# Patient Record
Sex: FEMALE | Race: WHITE | Employment: OTHER | URBAN - METROPOLITAN AREA
[De-identification: names, ages, dates, MRNs, and addresses within clinical notes are randomized per-mention and may not be internally consistent; named-entity substitution may affect disease eponyms.]

---

## 2018-01-09 PROBLEM — I99.8 ISCHEMIC PAIN OF FOOT, RIGHT: Status: ACTIVE | Noted: 2018-01-09

## 2018-01-09 PROBLEM — I96 GANGRENE OF TOE OF RIGHT FOOT (HCC): Status: ACTIVE | Noted: 2018-01-09

## 2018-01-09 PROBLEM — I73.9 PAD (PERIPHERAL ARTERY DISEASE) (HCC): Status: ACTIVE | Noted: 2018-01-09

## 2018-01-09 PROBLEM — M79.671 ISCHEMIC PAIN OF FOOT, RIGHT: Status: ACTIVE | Noted: 2018-01-09

## 2018-08-08 PROBLEM — IMO0002 GREAT TOE AMPUTATION STATUS, RIGHT: Status: ACTIVE | Noted: 2018-08-08

## 2018-08-08 PROBLEM — I96 GANGRENE OF RIGHT FOOT (HCC): Status: ACTIVE | Noted: 2018-08-08

## 2019-08-09 ENCOUNTER — APPOINTMENT (OUTPATIENT)
Dept: WOUND CARE | Facility: HOSPITAL | Age: 74
End: 2019-08-09
Attending: NURSE PRACTITIONER
Payer: MEDICARE

## 2019-10-01 ENCOUNTER — HOSPITAL ENCOUNTER (OUTPATIENT)
Dept: GENERAL RADIOLOGY | Facility: HOSPITAL | Age: 74
Discharge: HOME | End: 2019-10-01
Attending: NURSE PRACTITIONER
Payer: MEDICARE

## 2019-10-01 ENCOUNTER — OFFICE VISIT (OUTPATIENT)
Dept: WOUND CARE | Facility: HOSPITAL | Age: 74
End: 2019-10-01
Attending: NURSE PRACTITIONER
Payer: MEDICARE

## 2019-10-01 DIAGNOSIS — L97.519: ICD-10-CM

## 2019-10-01 DIAGNOSIS — I70.245 ATHEROSCLEROSIS OF NATIVE ARTERY OF BOTH LOWER EXTREMITIES WITH BILATERAL ULCERATION OF OTHER PART OF FEET (HCC): ICD-10-CM

## 2019-10-01 DIAGNOSIS — I70.235 ATHEROSCLEROSIS OF NATIVE ARTERY OF BOTH LOWER EXTREMITIES WITH BILATERAL ULCERATION OF OTHER PART OF FEET (HCC): ICD-10-CM

## 2019-10-01 DIAGNOSIS — I73.9 PVD (PERIPHERAL VASCULAR DISEASE) (HCC): ICD-10-CM

## 2019-10-01 DIAGNOSIS — L97.516 NON-PRESSURE CHRONIC ULCER OF OTHER PART OF RIGHT FOOT WITH BONE INVOLVEMENT WITHOUT EVIDENCE OF NECROSIS (HCC): Primary | ICD-10-CM

## 2019-10-01 PROCEDURE — 99214 OFFICE O/P EST MOD 30 MIN: CPT

## 2019-10-01 PROCEDURE — 73630 X-RAY EXAM OF FOOT: CPT | Performed by: NURSE PRACTITIONER

## 2019-10-01 NOTE — PROGRESS NOTES
Subjective    Chief Complaint  This information was obtained from the patient  Patient is here for initial visit. She had right great toe amp in june 2018 with Dr. Maddy Lew. Patient reports wound has never completely closed.  She has been going to t Medical History  This information was obtained from the patient, caregiver and chart.   Patient has a medical history of:  Ischemic pain of foot, right  PAD (peripheral artery disease)  Gangrene of toe of right foot  Great toe amputation status, right  Leuk Sprycel - oral 80 mg tablet  Sensipar - oral 30 mg tablet  Auryxia - oral 210 mg iron tablet  allopurinol - oral 300 mg tablet  Miralax - oral 17 gram powder in packet  Senna with Docusate Sodium - oral 8.6 mg-50 mg tablet  Norco - oral 10 mg-325 mg tablet Wound #1 Right Great Toe amp site is a chronic Full Thickness Arterial Ulcer and has received a status of Not Healed. Initial wound encounter measurements are 1cm length x 0.3cm width x 0.2cm depth, with an area of 0.3 sq cm and a volume of 0.06 cubic cm. 3) consider arterial pumps in the future  4) get xray to r/o osteo    Please let vascular know blood flow to the right wound be helpful    Misc/Additional Orders  Supplement with a daily multivitamin.   Increase dietary protein - look for Noble by go la all questions/concerns addressed with patient and daughter    Electronic Signature(s)  Signed By: Date:  Courtney RUCKER, ROSEMARIE-FABIAN, CFEASTON, CSWS, [facility] 10/01/2019 94:27:09       Entered By: Courtney Knox on 10/01/2019 09:08:10

## 2019-10-08 ENCOUNTER — OFFICE VISIT (OUTPATIENT)
Dept: WOUND CARE | Facility: HOSPITAL | Age: 74
End: 2019-10-08
Attending: NURSE PRACTITIONER
Payer: MEDICARE

## 2019-10-08 DIAGNOSIS — L97.516 NON-PRESSURE CHRONIC ULCER OF OTHER PART OF RIGHT FOOT WITH BONE INVOLVEMENT WITHOUT EVIDENCE OF NECROSIS (HCC): Primary | ICD-10-CM

## 2019-10-08 DIAGNOSIS — I70.245 ATHEROSCLEROSIS OF NATIVE ARTERY OF BOTH LOWER EXTREMITIES WITH BILATERAL ULCERATION OF OTHER PART OF FEET (HCC): ICD-10-CM

## 2019-10-08 DIAGNOSIS — I70.235 ATHEROSCLEROSIS OF NATIVE ARTERY OF BOTH LOWER EXTREMITIES WITH BILATERAL ULCERATION OF OTHER PART OF FEET (HCC): ICD-10-CM

## 2019-10-08 PROCEDURE — 99213 OFFICE O/P EST LOW 20 MIN: CPT

## 2019-10-08 NOTE — PROGRESS NOTES
Subjective    Chief Complaint  This information was obtained from the patient  Patient is here for a wound care follow up. She denies any pain or new wound concerns.     Allergies  No Known Allergies    HPI  This information was obtained from the patient, hammad Psychiatric: Nervousness / Tension    Patient denies complaints or symptoms related to:  Constitutional Symptoms (General Health):  Fatigue, Fever, Loss of Appetite, Marked Weight Change, Night Sweats, Chills  Respiratory: Cough, Shortness of Breath  Cardio Wound #1 Right Great Toe amp site is a chronic Full Thickness Arterial Ulcer and has received a status of Not Healed.  Subsequent wound encounter measurements are 1.3cm length x 0.3cm width x 0.3cm depth, with an area of 0.39 sq cm and a volume of 0.117 cub Misc/Additional Orders  Supplement with a daily multivitamin. Increase dietary protein - look for Noble by [employer] (These are essential branch chain amino acids that help with tissue building and wound healing) and take 2 packets/day.  you can order on continue with xeroform. we are awaiting approval for skin subs.  vascular intervention scheduled in 2 days    Electronic Signature(s)  Signed By: Date:  Vignesh DEL CID-C, ROSEMARIE-AP, CFCN, CSWS, [address] [place] [address],[address] [facility] 10/08/2019 08:47:42       Entered By: Vignesh Solano

## 2019-10-25 ENCOUNTER — OFFICE VISIT (OUTPATIENT)
Dept: WOUND CARE | Facility: HOSPITAL | Age: 74
End: 2019-10-25
Attending: NURSE PRACTITIONER
Payer: MEDICARE

## 2019-10-25 DIAGNOSIS — I70.235 ATHEROSCLEROSIS OF NATIVE ARTERY OF BOTH LOWER EXTREMITIES WITH BILATERAL ULCERATION OF OTHER PART OF FEET (HCC): ICD-10-CM

## 2019-10-25 DIAGNOSIS — L97.516 NON-PRESSURE CHRONIC ULCER OF OTHER PART OF RIGHT FOOT WITH BONE INVOLVEMENT WITHOUT EVIDENCE OF NECROSIS (HCC): Primary | ICD-10-CM

## 2019-10-25 DIAGNOSIS — I70.245 ATHEROSCLEROSIS OF NATIVE ARTERY OF BOTH LOWER EXTREMITIES WITH BILATERAL ULCERATION OF OTHER PART OF FEET (HCC): ICD-10-CM

## 2019-10-25 PROCEDURE — 11042 DBRDMT SUBQ TIS 1ST 20SQCM/<: CPT

## 2019-10-25 NOTE — PROGRESS NOTES
Patient YOB: 1945  Date: 10/25/2019  Physician / Tyrone Lay: Abbe Farias, [address]: Vesna Nav    Chief Complaint  This information was obtained from the patient  Pt here for f/u to right great toe. Pt since LOV had a angioplasty. 10-25-19 patient returns today, she did have angioplasty at [facility] on the right, left was supposed to be done yesterday however she was not feeling well.  the wound is essentially the same, very dessicated however the xeroform was laid across the wound Wound #1 Right Great Toe amp site is a chronic Full Thickness Arterial Ulcer and has received a status of Not Healed.  Subsequent wound encounter measurements are 0.7cm length x 0.1cm width x 0.2cm depth, with an area of 0.07 sq cm and a volume of 0.014 cub Judgment and insight intact. Alert and oriented times 3. No evidence of depression, anxiety, or agitation. Calm, cooperative, and communicative. Appropriate interactions and affect. Assessment    Active Problems    ICD-10  (Encounter Diagnosis) L97. Increase dietary protein - look for Noble by [employer] (These are essential branch chain amino acids that help with tissue building and wound healing) and take 2 packets/day.  you can order online at [employer] or [address] [place] [address] or some [place] [employer] carry it or can orde gauze, bandage or other soft tissue packing, advise correlation with history.       Radiology:  MRI with and without Contrast - right foot, non pressure chronic ulcer with bone exposed, rule out osteo        wound is essentially the same, discussed mri    E

## 2019-11-07 ENCOUNTER — OFFICE VISIT (OUTPATIENT)
Dept: WOUND CARE | Facility: HOSPITAL | Age: 74
End: 2019-11-07
Attending: NURSE PRACTITIONER
Payer: MEDICARE

## 2019-11-07 DIAGNOSIS — L97.516 NON-PRESSURE CHRONIC ULCER OF OTHER PART OF RIGHT FOOT WITH BONE INVOLVEMENT WITHOUT EVIDENCE OF NECROSIS (HCC): Primary | ICD-10-CM

## 2019-11-07 DIAGNOSIS — I70.245 ATHEROSCLEROSIS OF NATIVE ARTERY OF BOTH LOWER EXTREMITIES WITH BILATERAL ULCERATION OF OTHER PART OF FEET (HCC): ICD-10-CM

## 2019-11-07 DIAGNOSIS — I70.235 ATHEROSCLEROSIS OF NATIVE ARTERY OF BOTH LOWER EXTREMITIES WITH BILATERAL ULCERATION OF OTHER PART OF FEET (HCC): ICD-10-CM

## 2019-11-07 PROCEDURE — 15275 SKIN SUB GRAFT FACE/NK/HF/G: CPT

## 2019-11-07 NOTE — PROGRESS NOTES
Subjective    Chief Complaint  This information was obtained from the patient  Pt here for f/u to right great toe. Pt states her left is getting worse and she isn't happy with how her right foot looks.     Allergies  No Known Allergies    HPI  This informat assessment of osteo. 11-7-19 patient returns today with daughter. she has restarted her chemo meds and not been feeling well. she has not gottne her mri done yet due to not feeling well. she is scheduled for stenting on the left leg next week.   she was of 0.33 sq cm and a volume of 0.099 cubic cm. Bone is exposed. Suspected Osteomyelitis: Negative. No tunneling has been noted. No sinus tract has been noted. No undermining has been noted. There is a scant amount of serous drainage noted which has no odor. Extremities free of varicosities and edema. Capillary refill < 3 seconds. Digits are warm on right, cool on left. toenails are wnl for color, thickness and hygeine. + sparse hairgrowth on legs. .    Musculoskeletal:  patient is in wheelchair propelled by ot continue to soak with prontosan x 10 min with each dressing change  Epifix - epicord #1 placed 11-7-19, stacked into wound, covered with steristrip, silicone contact layer and non bordered foam  Change dressing every: - weekly    Follow-Up Appointments  Re and depth:  MRI ordered  xray 10-1-19: CONCLUSION:    Osteopenia. No acute fracture or dislocation. Moderate midfoot DJD, and loss of the plantar arch on the lateral view.  Plantar calcaneal spur, and spurring at the insertion of the Achilles tendon onto

## 2019-11-21 ENCOUNTER — OFFICE VISIT (OUTPATIENT)
Dept: WOUND CARE | Facility: HOSPITAL | Age: 74
End: 2019-11-21
Attending: NURSE PRACTITIONER
Payer: MEDICARE

## 2019-11-21 DIAGNOSIS — L97.516 NON-PRESSURE CHRONIC ULCER OF OTHER PART OF RIGHT FOOT WITH BONE INVOLVEMENT WITHOUT EVIDENCE OF NECROSIS (HCC): Primary | ICD-10-CM

## 2019-11-21 DIAGNOSIS — I70.245 ATHEROSCLEROSIS OF NATIVE ARTERY OF BOTH LOWER EXTREMITIES WITH BILATERAL ULCERATION OF OTHER PART OF FEET (HCC): ICD-10-CM

## 2019-11-21 DIAGNOSIS — I70.235 ATHEROSCLEROSIS OF NATIVE ARTERY OF BOTH LOWER EXTREMITIES WITH BILATERAL ULCERATION OF OTHER PART OF FEET (HCC): ICD-10-CM

## 2019-11-21 PROCEDURE — 11042 DBRDMT SUBQ TIS 1ST 20SQCM/<: CPT

## 2019-11-21 NOTE — PROGRESS NOTES
Subjective    Chief Complaint  This information was obtained from the patient  Pt here for f/u to right great toe. Daughter states MRI was not completed due to a stent being placed in her leg and it was too soon.     Allergies  No Known Allergies    HPI  Th mri for assessment of osteo. 11-7-19 patient returns today with daughter. she has restarted her chemo meds and not been feeling well. she has not gottne her mri done yet due to not feeling well. she is scheduled for stenting on the left leg next week. Marked Weight Change, Night Sweats, Chills  Respiratory: Cough, Shortness of Breath  Cardiovascular (Central/Peripheral): Chest Pain, Dyspnea on Exertion, Edema  Integumentary (Hair/Skin/Nails): Rash  Psychiatric: Claustrophobia, Memory Loss, Depression, M periwound skin is Cool. Periwound skin does not exhibit signs or symptoms of infection. Local Pulse is Doppler.     Vitals  Height/Length: 60 in (152.4 cm), Weight: 160 lbs (72.73 kgs), BMI: 31.2, Temperature: 97.1 °F (36.17 °C), Pulse: 73 bpm, Respiratory foot        Procedures    Wound #1  Wound #1 (Arterial Ulcer) is located on the right great toe amp site. A skin/subcutaneous tissue level excisional/surgical debridement with a total area debrided of 0.44 sq cm was performed by Alexa Wolfe NP.  Maile Teixeira vegetables, orange or yellow vegetables, cantaloupe, fortified dairy products, liver, fortified cereals  Zinc: Fortified cereals, red meats, seafood    S/S of Infection    Wound #2 Left, Medial Great Toe    Wound Cleansing & Dressings  Betadine  Change Master Saniya on 11/21/2019 10:46:33

## 2019-12-05 ENCOUNTER — OFFICE VISIT (OUTPATIENT)
Dept: WOUND CARE | Facility: HOSPITAL | Age: 74
End: 2019-12-05
Attending: NURSE PRACTITIONER
Payer: MEDICARE

## 2019-12-05 DIAGNOSIS — L97.516 NON-PRESSURE CHRONIC ULCER OF OTHER PART OF RIGHT FOOT WITH BONE INVOLVEMENT WITHOUT EVIDENCE OF NECROSIS (HCC): Primary | ICD-10-CM

## 2019-12-05 DIAGNOSIS — I70.235 ATHEROSCLEROSIS OF NATIVE ARTERY OF BOTH LOWER EXTREMITIES WITH BILATERAL ULCERATION OF OTHER PART OF FEET (HCC): ICD-10-CM

## 2019-12-05 DIAGNOSIS — I70.245 ATHEROSCLEROSIS OF NATIVE ARTERY OF BOTH LOWER EXTREMITIES WITH BILATERAL ULCERATION OF OTHER PART OF FEET (HCC): ICD-10-CM

## 2019-12-05 PROCEDURE — 99213 OFFICE O/P EST LOW 20 MIN: CPT

## 2019-12-05 NOTE — PROGRESS NOTES
Subjective    Chief Complaint  This information was obtained from the patient  Pt here for f/u to right great toe. Pt states her MRI is scheduled for 12/19.     Allergies  No Known Allergies    HPI  This information was obtained from the patient, caregiver today with daughter. she has restarted her chemo meds and not been feeling well. she has not gottne her mri done yet due to not feeling well. she is scheduled for stenting on the left leg next week.   she was approved for epifix and we placed one today how Chills  Respiratory: Cough, Shortness of Breath  Cardiovascular (Central/Peripheral): Chest Pain, Dyspnea on Exertion, Edema, Intermittent Claudication, Lower extremity (leg) resting pain  Integumentary (Hair/Skin/Nails): Rash  Psychiatric: Claustrophobia, periwound skin is Cool. Periwound skin does not exhibit signs or symptoms of infection. Local Pulse is Doppler.     Vitals  Height/Length: 60 in (152.4 cm), Weight: 160 lbs (72.73 kgs), BMI: 31.2, Temperature: 97.9 °F (36.61 °C), Pulse: 75 bpm, Respiratory with saline or wound cleanser  Other skin sub: - keracis sample 1.75x1.75-1/2 placed-rolled and placed in wound bed, then piece over moistened with saline and patient blood, rolled adaptic bolster, bordered foam  Change dressing every: - weekly    Follow-U ordered  xray 10-1-19: CONCLUSION:    Osteopenia. No acute fracture or dislocation. Moderate midfoot DJD, and loss of the plantar arch on the lateral view. Plantar calcaneal spur, and spurring at the insertion of the Achilles tendon onto the calcaneus.

## 2019-12-12 ENCOUNTER — OFFICE VISIT (OUTPATIENT)
Dept: WOUND CARE | Facility: HOSPITAL | Age: 74
End: 2019-12-12
Attending: NURSE PRACTITIONER
Payer: MEDICARE

## 2019-12-12 DIAGNOSIS — L97.516 NON-PRESSURE CHRONIC ULCER OF OTHER PART OF RIGHT FOOT WITH BONE INVOLVEMENT WITHOUT EVIDENCE OF NECROSIS (HCC): Primary | ICD-10-CM

## 2019-12-12 DIAGNOSIS — I70.245 ATHEROSCLEROSIS OF NATIVE ARTERY OF BOTH LOWER EXTREMITIES WITH BILATERAL ULCERATION OF OTHER PART OF FEET (HCC): ICD-10-CM

## 2019-12-12 DIAGNOSIS — I70.235 ATHEROSCLEROSIS OF NATIVE ARTERY OF BOTH LOWER EXTREMITIES WITH BILATERAL ULCERATION OF OTHER PART OF FEET (HCC): ICD-10-CM

## 2019-12-12 PROCEDURE — 99213 OFFICE O/P EST LOW 20 MIN: CPT

## 2019-12-12 NOTE — PROGRESS NOTES
Subjective    Chief Complaint  This information was obtained from the patient  Pt here for f/u to right great toe. Pt states her MRI is scheduled for 12/19.  Patients denies any concern or pain    Allergies  No Known Allergies    HPI  This information was o osteo.    11-7-19 patient returns today with daughter. she has restarted her chemo meds and not been feeling well. she has not gottne her mri done yet due to not feeling well. she is scheduled for stenting on the left leg next week.   she was approved for Tension    Patient denies complaints or symptoms related to:  Constitutional Symptoms (General Health):  Fatigue, Fever, Loss of Appetite, Marked Weight Change, Night Sweats, Chills  Respiratory: Cough, Shortness of Breath  Cardiovascular (Central/Periphera normal. The periwound skin exhibited: Rubor. The periwound skin did not exhibit: Callus, Erythema. The temperature of the periwound skin is Cool. Periwound skin does not exhibit signs or symptoms of infection. Local Pulse is Doppler.     Vitals  Height/Keerthi cleanser  Other skin sub: - nathen sample #3- 3x3. 5-1/4 placed-rolled and placed in wound bed, then piece over moistened with saline and patient blood, rolled adaptic bolster, foam with medipore  Change dressing every: - weekly    Follow-Up Appointments albumin3. 8/tp6.4/[id number]  Osteomyelitis suspected due to: - length of time open and depth:  MRI ordered  xray 10-1-19: CONCLUSION:    Osteopenia. No acute fracture or dislocation.   Moderate midfoot DJD, and loss of the plantar arch on the lateral

## 2019-12-12 NOTE — IMAGING NOTE
Pt scheduled next week on Thursday for MRI w/contrast. Pt has ESRD and dialyzes MWF. Daughter states pt makes \"very little urine\". Her nephrologist is Dr Leona Villarreal.  I reached out to office and asked for recent labs and to ask MD if OKay to proceed w/co

## 2019-12-16 NOTE — IMAGING NOTE
I contacted Dr Brandon Powell office [phone],[facility] wont be in the office til Wednesday at 1330. I reached out to  dialysis clinic  [phone] as directed and no answer.  EFEG

## 2019-12-19 ENCOUNTER — OFFICE VISIT (OUTPATIENT)
Dept: WOUND CARE | Facility: HOSPITAL | Age: 74
End: 2019-12-19
Attending: NURSE PRACTITIONER
Payer: MEDICARE

## 2019-12-19 ENCOUNTER — HOSPITAL ENCOUNTER (OUTPATIENT)
Dept: MRI IMAGING | Facility: HOSPITAL | Age: 74
Discharge: HOME | End: 2019-12-19
Attending: NURSE PRACTITIONER
Payer: MEDICARE

## 2019-12-19 DIAGNOSIS — L97.516 NON-PRESSURE CHRONIC ULCER OF OTHER PART OF RIGHT FOOT WITH BONE INVOLVEMENT WITHOUT EVIDENCE OF NECROSIS (HCC): ICD-10-CM

## 2019-12-19 DIAGNOSIS — I70.245 ATHEROSCLEROSIS OF NATIVE ARTERY OF BOTH LOWER EXTREMITIES WITH BILATERAL ULCERATION OF OTHER PART OF FEET (HCC): ICD-10-CM

## 2019-12-19 DIAGNOSIS — I70.235 ATHEROSCLEROSIS OF NATIVE ARTERY OF BOTH LOWER EXTREMITIES WITH BILATERAL ULCERATION OF OTHER PART OF FEET (HCC): ICD-10-CM

## 2019-12-19 DIAGNOSIS — L97.516 NON-PRESSURE CHRONIC ULCER OF OTHER PART OF RIGHT FOOT WITH BONE INVOLVEMENT WITHOUT EVIDENCE OF NECROSIS (HCC): Primary | ICD-10-CM

## 2019-12-19 PROCEDURE — 99213 OFFICE O/P EST LOW 20 MIN: CPT

## 2019-12-19 PROCEDURE — 73718 MRI LOWER EXTREMITY W/O DYE: CPT | Performed by: NURSE PRACTITIONER

## 2019-12-19 NOTE — PROGRESS NOTES
Subjective    Chief Complaint  This information was obtained from the patient  Pt here for f/u to right great toe. Pt states her MRI is scheduled for 12/19.  Patients denies any concern or pain    Allergies  No Known Allergies    HPI  This information was o osteo.    11-7-19 patient returns today with daughter. she has restarted her chemo meds and not been feeling well. she has not gottne her mri done yet due to not feeling well. she is scheduled for stenting on the left leg next week.   she was approved for 2022-06    Review of Systems (ROS)  This information was obtained from the patient, caregiver and chart. Complaints and Symptoms  Patient complains of:  Cardiovascular (Central/Peripheral):  Other (pad, endovascular peripheral stenting, cabg x 4)  Genito Arterial Ulcer and has received a status of Not Healed. Subsequent wound encounter measurements are 0.4cm length x 0.2cm width with no measurable depth, with an area of 0.08 sq cm . No tunneling has been noted. No sinus tract has been noted.  No undermining foot with fat layer exposed  (Encounter Diagnosis) I70.235 - Atherosclerosis of native arteries of right leg with ulceration of other part of foot  (Encounter Diagnosis) I70.245 - Atherosclerosis of native arteries of left leg with ulceration of other part doppler. - monophasic signals at the right anterior ankle/pt/plantar aspect 1st met head and left dp/pt/Vvery weak distal hallux  Perfusion assessed by palpation of pulses.  - very weak dp/pt right  Arterial/Vascular consult: - fawad  angioplas

## 2020-01-02 ENCOUNTER — OFFICE VISIT (OUTPATIENT)
Dept: WOUND CARE | Facility: HOSPITAL | Age: 75
End: 2020-01-02
Attending: NURSE PRACTITIONER
Payer: MEDICARE

## 2020-01-02 DIAGNOSIS — L97.516 NON-PRESSURE CHRONIC ULCER OF OTHER PART OF RIGHT FOOT WITH BONE INVOLVEMENT WITHOUT EVIDENCE OF NECROSIS (HCC): Primary | ICD-10-CM

## 2020-01-02 DIAGNOSIS — I70.245 ATHEROSCLEROSIS OF NATIVE ARTERY OF BOTH LOWER EXTREMITIES WITH BILATERAL ULCERATION OF OTHER PART OF FEET (HCC): ICD-10-CM

## 2020-01-02 DIAGNOSIS — I70.235 ATHEROSCLEROSIS OF NATIVE ARTERY OF BOTH LOWER EXTREMITIES WITH BILATERAL ULCERATION OF OTHER PART OF FEET (HCC): ICD-10-CM

## 2020-01-02 PROCEDURE — 99213 OFFICE O/P EST LOW 20 MIN: CPT

## 2020-01-02 NOTE — PROGRESS NOTES
Subjective    Chief Complaint  This information was obtained from the patient  Patient is here for a wound care follow up. She denies pain to the wounds.     Allergies  No Known Allergies    HPI  This information was obtained from the patient, caregiver and with daughter. she has restarted her chemo meds and not been feeling well. she has not gottne her mri done yet due to not feeling well. she is scheduled for stenting on the left leg next week.   she was approved for epifix and we placed one today however I this time. no s/s of infection, no c/o pain    Review of Systems (ROS)  This information was obtained from the patient, caregiver and chart. Complaints and Symptoms  Patient complains of:  Cardiovascular (Central/Peripheral):  Other (pad, endovascular pe Great Toe is a chronic Full Thickness Arterial Ulcer and has received an outcome of Resolved. Subsequent wound encounter measurements are 0cm length x 0cm width with no measurable depth, with an area of 0 sq cm . No tunneling has been noted.  No sinus tract Atherosclerosis of native arteries of right leg with ulceration of other part of foot  (Encounter Diagnosis) I70.245 - Atherosclerosis of native arteries of left leg with ulceration of other part of foot        Plan    Wound Orders:  Wound #1 Right Great T fawad  angioplasty 10-10-19 RIGHT, LEFT 11-14-19  Last sharp debridement date: - 10-25-19  Last albumin date and value: - november 2019 albumin3. 8/tp6.4/[id number]  Osteomyelitis suspected due to: - length of time open and depth:  MRI dec

## 2020-01-09 ENCOUNTER — OFFICE VISIT (OUTPATIENT)
Dept: WOUND CARE | Facility: HOSPITAL | Age: 75
End: 2020-01-09
Attending: NURSE PRACTITIONER
Payer: MEDICARE

## 2020-01-09 DIAGNOSIS — I70.245 ATHEROSCLEROSIS OF NATIVE ARTERY OF BOTH LOWER EXTREMITIES WITH BILATERAL ULCERATION OF OTHER PART OF FEET (HCC): ICD-10-CM

## 2020-01-09 DIAGNOSIS — I70.235 ATHEROSCLEROSIS OF NATIVE ARTERY OF BOTH LOWER EXTREMITIES WITH BILATERAL ULCERATION OF OTHER PART OF FEET (HCC): ICD-10-CM

## 2020-01-09 DIAGNOSIS — L97.516 NON-PRESSURE CHRONIC ULCER OF OTHER PART OF RIGHT FOOT WITH BONE INVOLVEMENT WITHOUT EVIDENCE OF NECROSIS (HCC): Primary | ICD-10-CM

## 2020-01-09 PROCEDURE — 99213 OFFICE O/P EST LOW 20 MIN: CPT

## 2020-01-09 NOTE — PROGRESS NOTES
Subjective    Chief Complaint  This information was obtained from the patient  Patient is here for a wound care follow up. She denies pain to the wounds.     Allergies  No Known Allergies    HPI  This information was obtained from the patient, caregiver and for osteo. will continue with nathen at this time. no s/s of infection, no c/o pain    1-9-20 Patient returns today, it appears there is still fish remaining that i stacked in last week.   we will utlize triad coloplast hydrophillic paste for the next wee periwound skin moisture is normal. The periwound skin did not exhibit: Edema, Callus, Crepitus, Erythema, Rubor. The temperature of the periwound skin is Warm. Periwound skin does not exhibit signs or symptoms of infection. Local Pulse is Weak.   General No with bandaid daily  Change Dressing Daily and PRN    Follow-Up Appointments  Return Appointment in 1 week. Misc/Additional Orders  Supplement with a daily multivitamin.   Increase dietary protein - look for Noble by go [facility] (These are essential branc midfoot DJD, and loss of the plantar arch on the lateral view. Plantar calcaneal spur, and spurring at the insertion of the Achilles tendon onto the calcaneus.   Amputation through the approximate distal 3rd of the shaft of the 1st metatarsal bone, with scr

## 2020-01-16 ENCOUNTER — OFFICE VISIT (OUTPATIENT)
Dept: WOUND CARE | Facility: HOSPITAL | Age: 75
End: 2020-01-16
Attending: NURSE PRACTITIONER
Payer: MEDICARE

## 2020-01-16 DIAGNOSIS — I70.235 ATHEROSCLEROSIS OF NATIVE ARTERY OF BOTH LOWER EXTREMITIES WITH BILATERAL ULCERATION OF OTHER PART OF FEET (HCC): ICD-10-CM

## 2020-01-16 DIAGNOSIS — L97.516 NON-PRESSURE CHRONIC ULCER OF OTHER PART OF RIGHT FOOT WITH BONE INVOLVEMENT WITHOUT EVIDENCE OF NECROSIS (HCC): Primary | ICD-10-CM

## 2020-01-16 DIAGNOSIS — I70.245 ATHEROSCLEROSIS OF NATIVE ARTERY OF BOTH LOWER EXTREMITIES WITH BILATERAL ULCERATION OF OTHER PART OF FEET (HCC): ICD-10-CM

## 2020-01-16 PROCEDURE — 99213 OFFICE O/P EST LOW 20 MIN: CPT

## 2020-01-16 NOTE — PROGRESS NOTES
Subjective    Chief Complaint  This information was obtained from the patient  Patient is here for a wound care follow up. She denies any current pain to the wounds.     Allergies  No Known Allergies    HPI  This information was obtained from the patient, hammad negative for osteo. will continue with nathen at this time. no s/s of infection, no c/o pain    1-9-20 Patient returns today, it appears there is still fish remaining that i stacked in last week.   we will utlize triad coloplast hydrophillic paste for the undermining has been noted. There was no drainage noted. The patient reports a wound pain of level 0/10. The wound margin is rolled. Wound bed has 26-50% epithelialization, 26-50% pink, firm granulation.   The periwound skin moisture is normal. The periwoun site    Wound Cleansing & Dressings  May shower with protection.   Cleanse with saline or wound cleanser  Other: - apply COLOPLAST TRIAD HYDROPHILLIC PASTE, cover with bandaid daily  Change Dressing Daily and PRN    Follow-Up Appointments  Return Appointmen complications along the surgical site. No evidence of recurrent osteomyelitis. xray 10-1-19: CONCLUSION:    Osteopenia. No acute fracture or dislocation. Moderate midfoot DJD, and loss of the plantar arch on the lateral view.  Plantar calcaneal spur, and

## 2020-01-23 ENCOUNTER — OFFICE VISIT (OUTPATIENT)
Dept: WOUND CARE | Facility: HOSPITAL | Age: 75
End: 2020-01-23
Attending: NURSE PRACTITIONER
Payer: MEDICARE

## 2020-01-23 DIAGNOSIS — I70.245 ATHEROSCLEROSIS OF NATIVE ARTERY OF BOTH LOWER EXTREMITIES WITH BILATERAL ULCERATION OF OTHER PART OF FEET (HCC): ICD-10-CM

## 2020-01-23 DIAGNOSIS — L97.516 NON-PRESSURE CHRONIC ULCER OF OTHER PART OF RIGHT FOOT WITH BONE INVOLVEMENT WITHOUT EVIDENCE OF NECROSIS (HCC): Primary | ICD-10-CM

## 2020-01-23 DIAGNOSIS — I70.235 ATHEROSCLEROSIS OF NATIVE ARTERY OF BOTH LOWER EXTREMITIES WITH BILATERAL ULCERATION OF OTHER PART OF FEET (HCC): ICD-10-CM

## 2020-01-23 PROCEDURE — 99213 OFFICE O/P EST LOW 20 MIN: CPT

## 2020-01-23 NOTE — PROGRESS NOTES
Subjective    Chief Complaint  This information was obtained from the patient  Patient is here for a wound care follow up. She denies any current pain to the wounds.     Allergies  No Known Allergies    HPI  This information was obtained from the patient, hammad drainage    1-23-20 patient returns today, she has been using the triad paste, it appears there is pinpoint \"glistening\" in the base of the wound which could be scant drainage.  we will cover with duoderm thick and patient is to leave it on for the next w did not exhibit: Edema, Callus, Crepitus, Erythema, Rubor. The temperature of the periwound skin is Cool. Periwound skin does not exhibit signs or symptoms of infection. Local Pulse is Weak.     Vitals  Height/Length: 60 in (152.4 cm), Weight: 160 lbs (72.7 multivitamin. Increase dietary protein - look for [place]be by [employer] (These are essential branch chain amino acids that help with tissue building and wound healing) and take 2 packets/day.  you can order online at [employer] or [facility] or some [employer] carry calcaneus. Amputation through the approximate distal 3rd of the shaft of the 1st metatarsal bone, with screw in the metatarsal bone. Arterial calcification.   Soft tissue density medial distal foot just medial to the 2nd metatarsophalangeal joint, may ref

## 2020-01-30 ENCOUNTER — OFFICE VISIT (OUTPATIENT)
Dept: WOUND CARE | Facility: HOSPITAL | Age: 75
End: 2020-01-30
Attending: NURSE PRACTITIONER
Payer: MEDICARE

## 2020-01-30 DIAGNOSIS — I70.235 ATHEROSCLEROSIS OF NATIVE ARTERY OF BOTH LOWER EXTREMITIES WITH BILATERAL ULCERATION OF OTHER PART OF FEET (HCC): ICD-10-CM

## 2020-01-30 DIAGNOSIS — L97.516 NON-PRESSURE CHRONIC ULCER OF OTHER PART OF RIGHT FOOT WITH BONE INVOLVEMENT WITHOUT EVIDENCE OF NECROSIS (HCC): Primary | ICD-10-CM

## 2020-01-30 DIAGNOSIS — I70.245 ATHEROSCLEROSIS OF NATIVE ARTERY OF BOTH LOWER EXTREMITIES WITH BILATERAL ULCERATION OF OTHER PART OF FEET (HCC): ICD-10-CM

## 2020-01-30 PROCEDURE — 99213 OFFICE O/P EST LOW 20 MIN: CPT

## 2020-01-30 NOTE — PROGRESS NOTES
Subjective    Chief Complaint  This information was obtained from the patient  Patient is here for a wound care follow up, denies any pain to wound at this time.     Allergies  No Known Allergies    HPI  This information was obtained from the patient, careg patient returns today, she has been using the triad paste, it appears there is pinpoint \"glistening\" in the base of the wound which could be scant drainage.  we will cover with duoderm thick and patient is to leave it on for the next week but check it oft an area of 0.01 sq cm . No tunneling has been noted. No sinus tract has been noted. No undermining has been noted. There is a scant amount of serous drainage noted which has no odor. The patient reports a wound pain of level 0/10.  The wound margin is vanessa of native arteries of left leg with ulceration of other part of foot        Plan    Wound Orders:  Wound #1 Right Great Toe amp site    Wound Cleansing & Dressings  May shower with protection.   Betadine  Dry Gauze  Medipore tape (no substitution)  Change D evidence of soft tissue abscess or significant postoperative complications along the surgical site. No evidence of recurrent osteomyelitis. xray 10-1-19: CONCLUSION:    Osteopenia. No acute fracture or dislocation.   Moderate midfoot DJD, and loss of the

## 2020-02-06 ENCOUNTER — OFFICE VISIT (OUTPATIENT)
Dept: WOUND CARE | Facility: HOSPITAL | Age: 75
End: 2020-02-06
Attending: NURSE PRACTITIONER
Payer: MEDICARE

## 2020-02-06 DIAGNOSIS — L97.516 NON-PRESSURE CHRONIC ULCER OF OTHER PART OF RIGHT FOOT WITH BONE INVOLVEMENT WITHOUT EVIDENCE OF NECROSIS (HCC): Primary | ICD-10-CM

## 2020-02-06 DIAGNOSIS — I70.235 ATHEROSCLEROSIS OF NATIVE ARTERY OF BOTH LOWER EXTREMITIES WITH BILATERAL ULCERATION OF OTHER PART OF FEET (HCC): ICD-10-CM

## 2020-02-06 DIAGNOSIS — I70.245 ATHEROSCLEROSIS OF NATIVE ARTERY OF BOTH LOWER EXTREMITIES WITH BILATERAL ULCERATION OF OTHER PART OF FEET (HCC): ICD-10-CM

## 2020-02-06 PROCEDURE — 99213 OFFICE O/P EST LOW 20 MIN: CPT

## 2020-02-06 NOTE — PROGRESS NOTES
Subjective    Chief Complaint  This information was obtained from the patient  Patient is here for a wound care follow up, denies any pain to wound at this time.     Allergies  No Known Allergies    HPI  This information was obtained from the patient, careg patient returns today, she has been using the triad paste, it appears there is pinpoint \"glistening\" in the base of the wound which could be scant drainage.  we will cover with duoderm thick and patient is to leave it on for the next week but check it oft Information  Medication reconciliation completed at today's visit. : Yes        Objective    Wound Assessment(s)  Wound #1 Right Great Toe amp site is a chronic Full Thickness Arterial Ulcer and has received an outcome of Resolved.  Subsequent wound encount I70.235 - Atherosclerosis of native arteries of right leg with ulceration of other part of foot  (Encounter Diagnosis) I70.245 - Atherosclerosis of native arteries of left leg with ulceration of other part of foot        Plan    Additional Orders:     Cathalene Frame

## 2020-02-13 ENCOUNTER — APPOINTMENT (OUTPATIENT)
Dept: WOUND CARE | Facility: HOSPITAL | Age: 75
End: 2020-02-13
Attending: NURSE PRACTITIONER
Payer: MEDICARE

## 2020-02-20 ENCOUNTER — APPOINTMENT (OUTPATIENT)
Dept: WOUND CARE | Facility: HOSPITAL | Age: 75
End: 2020-02-20
Attending: NURSE PRACTITIONER
Payer: MEDICARE

## 2020-02-27 ENCOUNTER — APPOINTMENT (OUTPATIENT)
Dept: WOUND CARE | Facility: HOSPITAL | Age: 75
End: 2020-02-27
Attending: NURSE PRACTITIONER
Payer: MEDICARE

## 2022-03-07 RX ORDER — IMATINIB MESYLATE 100 MG/1
300 TABLET, FILM COATED ORAL DAILY
COMMUNITY

## 2022-03-07 RX ORDER — CINACALCET 30 MG/1
30 TABLET, FILM COATED ORAL
COMMUNITY

## 2022-03-07 RX ORDER — HYDROCODONE BITARTRATE AND ACETAMINOPHEN 10; 325 MG/1; MG/1
1 TABLET ORAL EVERY 6 HOURS PRN
COMMUNITY
End: 2022-03-19

## 2022-03-07 RX ORDER — MELATONIN
1000 DAILY
COMMUNITY

## 2022-03-07 RX ORDER — ACETAMINOPHEN 160 MG
2000 TABLET,DISINTEGRATING ORAL DAILY
COMMUNITY

## 2022-03-07 RX ORDER — CALCIUM ACETATE 667 MG/1
1 CAPSULE ORAL
COMMUNITY

## 2022-03-07 RX ORDER — ASPIRIN 81 MG/1
81 TABLET ORAL DAILY
COMMUNITY

## 2022-03-07 RX ORDER — ALLOPURINOL 300 MG/1
300 TABLET ORAL DAILY
COMMUNITY

## 2022-03-07 RX ORDER — THIAMINE HCL 100 MG
TABLET ORAL
COMMUNITY

## 2022-03-07 RX ORDER — ATORVASTATIN CALCIUM 10 MG/1
10 TABLET, FILM COATED ORAL NIGHTLY
COMMUNITY

## 2022-03-07 RX ORDER — FENOFIBRATE 145 MG/1
145 TABLET, COATED ORAL DAILY
COMMUNITY

## 2022-03-07 RX ORDER — CLOPIDOGREL BISULFATE 75 MG/1
75 TABLET ORAL DAILY
COMMUNITY

## 2022-03-14 ENCOUNTER — EKG ENCOUNTER (OUTPATIENT)
Dept: LAB | Age: 77
DRG: 037 | End: 2022-03-14
Attending: SURGERY
Payer: MEDICARE

## 2022-03-14 ENCOUNTER — LAB ENCOUNTER (OUTPATIENT)
Dept: LAB | Age: 77
DRG: 037 | End: 2022-03-14
Attending: SURGERY
Payer: MEDICARE

## 2022-03-14 DIAGNOSIS — Z20.822 ENCOUNTER FOR PREOPERATIVE SCREENING LABORATORY TESTING FOR COVID-19 VIRUS: ICD-10-CM

## 2022-03-14 DIAGNOSIS — Z01.818 PRE-OP TESTING: ICD-10-CM

## 2022-03-14 DIAGNOSIS — Z91.89 AT RISK FOR BLEEDING: ICD-10-CM

## 2022-03-14 DIAGNOSIS — I65.21 CAROTID STENOSIS, RIGHT: ICD-10-CM

## 2022-03-14 DIAGNOSIS — Z01.812 ENCOUNTER FOR PREOPERATIVE SCREENING LABORATORY TESTING FOR COVID-19 VIRUS: ICD-10-CM

## 2022-03-14 LAB
ALBUMIN SERPL-MCNC: 3.3 G/DL (ref 3.4–5)
ALBUMIN/GLOB SERPL: 1.2 {RATIO} (ref 1–2)
ALP LIVER SERPL-CCNC: 47 U/L
ALT SERPL-CCNC: 31 U/L
ANION GAP SERPL CALC-SCNC: 4 MMOL/L (ref 0–18)
ANTIBODY SCREEN: NEGATIVE
APTT PPP: 26.2 SECONDS (ref 23.3–35.6)
AST SERPL-CCNC: 35 U/L (ref 15–37)
BASOPHILS # BLD AUTO: 0.03 X10(3) UL (ref 0–0.2)
BASOPHILS NFR BLD AUTO: 0.8 %
BILIRUB SERPL-MCNC: 0.6 MG/DL (ref 0.1–2)
BUN BLD-MCNC: 24 MG/DL (ref 7–18)
CALCIUM BLD-MCNC: 8.7 MG/DL (ref 8.5–10.1)
CHLORIDE SERPL-SCNC: 99 MMOL/L (ref 98–112)
CO2 SERPL-SCNC: 37 MMOL/L (ref 21–32)
CREAT BLD-MCNC: 3.31 MG/DL
EOSINOPHIL # BLD AUTO: 0.16 X10(3) UL (ref 0–0.7)
EOSINOPHIL NFR BLD AUTO: 4.2 %
ERYTHROCYTE [DISTWIDTH] IN BLOOD BY AUTOMATED COUNT: 14.9 %
FASTING STATUS PATIENT QL REPORTED: NO
GLOBULIN PLAS-MCNC: 2.7 G/DL (ref 2.8–4.4)
GLUCOSE BLD-MCNC: 107 MG/DL (ref 70–99)
HCT VFR BLD AUTO: 35.2 %
HGB BLD-MCNC: 12.4 G/DL
IMM GRANULOCYTES # BLD AUTO: 0 X10(3) UL (ref 0–1)
IMM GRANULOCYTES NFR BLD: 0 %
INR BLD: 1.01 (ref 0.8–1.2)
LYMPHOCYTES # BLD AUTO: 0.89 X10(3) UL (ref 1–4)
LYMPHOCYTES NFR BLD AUTO: 23.4 %
MCH RBC QN AUTO: 35.7 PG (ref 26–34)
MCHC RBC AUTO-ENTMCNC: 35.2 G/DL (ref 31–37)
MCV RBC AUTO: 101.4 FL
MONOCYTES # BLD AUTO: 0.64 X10(3) UL (ref 0.1–1)
MONOCYTES NFR BLD AUTO: 16.8 %
NEUTROPHILS # BLD AUTO: 2.09 X10 (3) UL (ref 1.5–7.7)
NEUTROPHILS # BLD AUTO: 2.09 X10(3) UL (ref 1.5–7.7)
NEUTROPHILS NFR BLD AUTO: 54.8 %
OSMOLALITY SERPL CALC.SUM OF ELEC: 295 MOSM/KG (ref 275–295)
PLATELET # BLD AUTO: 116 10(3)UL (ref 150–450)
POTASSIUM SERPL-SCNC: 3.5 MMOL/L (ref 3.5–5.1)
PROT SERPL-MCNC: 6 G/DL (ref 6.4–8.2)
PROTHROMBIN TIME: 13.3 SECONDS (ref 11.6–14.8)
RBC # BLD AUTO: 3.47 X10(6)UL
RH BLOOD TYPE: POSITIVE
SODIUM SERPL-SCNC: 140 MMOL/L (ref 136–145)
WBC # BLD AUTO: 3.8 X10(3) UL (ref 4–11)

## 2022-03-14 PROCEDURE — 85025 COMPLETE CBC W/AUTO DIFF WBC: CPT

## 2022-03-14 PROCEDURE — 93005 ELECTROCARDIOGRAM TRACING: CPT

## 2022-03-14 PROCEDURE — 85730 THROMBOPLASTIN TIME PARTIAL: CPT

## 2022-03-14 PROCEDURE — 85610 PROTHROMBIN TIME: CPT

## 2022-03-14 PROCEDURE — 36415 COLL VENOUS BLD VENIPUNCTURE: CPT

## 2022-03-14 PROCEDURE — 86901 BLOOD TYPING SEROLOGIC RH(D): CPT

## 2022-03-14 PROCEDURE — 86900 BLOOD TYPING SEROLOGIC ABO: CPT

## 2022-03-14 PROCEDURE — 86850 RBC ANTIBODY SCREEN: CPT

## 2022-03-14 PROCEDURE — 93010 ELECTROCARDIOGRAM REPORT: CPT | Performed by: INTERNAL MEDICINE

## 2022-03-14 PROCEDURE — 80053 COMPREHEN METABOLIC PANEL: CPT

## 2022-03-15 LAB
ATRIAL RATE: 82 BPM
P AXIS: 43 DEGREES
P-R INTERVAL: 218 MS
Q-T INTERVAL: 432 MS
QRS DURATION: 128 MS
QTC CALCULATION (BEZET): 504 MS
R AXIS: 134 DEGREES
SARS-COV-2 RNA RESP QL NAA+PROBE: NOT DETECTED
T AXIS: 2 DEGREES
VENTRICULAR RATE: 82 BPM

## 2022-03-17 ENCOUNTER — ANESTHESIA EVENT (OUTPATIENT)
Dept: CARDIAC SURGERY | Facility: HOSPITAL | Age: 77
DRG: 037 | End: 2022-03-17
Payer: MEDICARE

## 2022-03-17 ENCOUNTER — ANESTHESIA (OUTPATIENT)
Dept: CARDIAC SURGERY | Facility: HOSPITAL | Age: 77
DRG: 037 | End: 2022-03-17
Payer: MEDICARE

## 2022-03-17 ENCOUNTER — HOSPITAL ENCOUNTER (INPATIENT)
Facility: HOSPITAL | Age: 77
LOS: 2 days | Discharge: HOME | DRG: 037 | End: 2022-03-19
Attending: SURGERY | Admitting: SURGERY
Payer: MEDICARE

## 2022-03-17 DIAGNOSIS — Z20.822 ENCOUNTER FOR PREOPERATIVE SCREENING LABORATORY TESTING FOR COVID-19 VIRUS: ICD-10-CM

## 2022-03-17 DIAGNOSIS — Z91.89 AT RISK FOR BLEEDING: ICD-10-CM

## 2022-03-17 DIAGNOSIS — Z01.818 PRE-OP TESTING: ICD-10-CM

## 2022-03-17 DIAGNOSIS — I65.21 CAROTID STENOSIS, RIGHT: Primary | ICD-10-CM

## 2022-03-17 DIAGNOSIS — Z01.812 ENCOUNTER FOR PREOPERATIVE SCREENING LABORATORY TESTING FOR COVID-19 VIRUS: ICD-10-CM

## 2022-03-17 LAB
ALBUMIN SERPL-MCNC: 2.6 G/DL (ref 3.4–5)
ALBUMIN/GLOB SERPL: 1 {RATIO} (ref 1–2)
ALP LIVER SERPL-CCNC: 37 U/L
ALT SERPL-CCNC: 22 U/L
ANION GAP SERPL CALC-SCNC: 3 MMOL/L (ref 0–18)
ANION GAP SERPL CALC-SCNC: 9 MMOL/L (ref 0–18)
APTT PPP: 32.2 SECONDS (ref 23.3–35.6)
AST SERPL-CCNC: 28 U/L (ref 15–37)
BASE EXCESS BLD CALC-SCNC: 14 MMOL/L
BILIRUB SERPL-MCNC: 0.6 MG/DL (ref 0.1–2)
BUN BLD-MCNC: 33 MG/DL (ref 7–18)
BUN BLD-MCNC: 35 MG/DL (ref 7–18)
CA-I BLD-SCNC: 1.07 MMOL/L (ref 1.12–1.32)
CALCIUM BLD-MCNC: 8.4 MG/DL (ref 8.5–10.1)
CALCIUM BLD-MCNC: 8.9 MG/DL (ref 8.5–10.1)
CHLORIDE SERPL-SCNC: 101 MMOL/L (ref 98–112)
CHLORIDE SERPL-SCNC: 99 MMOL/L (ref 98–112)
CO2 BLD-SCNC: 36 MMOL/L (ref 22–32)
CO2 SERPL-SCNC: 32 MMOL/L (ref 21–32)
CO2 SERPL-SCNC: 35 MMOL/L (ref 21–32)
CREAT BLD-MCNC: 3.9 MG/DL
CREAT BLD-MCNC: 4.16 MG/DL
ERYTHROCYTE [DISTWIDTH] IN BLOOD BY AUTOMATED COUNT: 14.8 %
GLOBULIN PLAS-MCNC: 2.6 G/DL (ref 2.8–4.4)
GLUCOSE BLD-MCNC: 109 MG/DL (ref 70–99)
GLUCOSE BLD-MCNC: 112 MG/DL (ref 70–99)
HCO3 BLD-SCNC: 35.3 MEQ/L
HCT VFR BLD AUTO: 27 %
HCT VFR BLD CALC: 31 %
HGB BLD-MCNC: 9.3 G/DL
INR BLD: 1.19 (ref 0.8–1.2)
ISTAT ACTIVATED CLOTTING TIME: 142 SECONDS (ref 74–137)
ISTAT ACTIVATED CLOTTING TIME: 142 SECONDS (ref 74–137)
ISTAT ACTIVATED CLOTTING TIME: 267 SECONDS (ref 74–137)
ISTAT ACTIVATED CLOTTING TIME: 267 SECONDS (ref 74–137)
ISTAT ACTIVATED CLOTTING TIME: 279 SECONDS (ref 74–137)
MCH RBC QN AUTO: 35.2 PG (ref 26–34)
MCHC RBC AUTO-ENTMCNC: 34.4 G/DL (ref 31–37)
MCV RBC AUTO: 102.3 FL
OSMOLALITY SERPL CALC.SUM OF ELEC: 293 MOSM/KG (ref 275–295)
OSMOLALITY SERPL CALC.SUM OF ELEC: 302 MOSM/KG (ref 275–295)
PCO2 BLD: 35.8 MMHG
PH BLD: 7.6 [PH]
PLATELET # BLD AUTO: 92 10(3)UL (ref 150–450)
PO2 BLD: 259 MMHG
POTASSIUM BLD-SCNC: 3.4 MMOL/L (ref 3.6–5.1)
POTASSIUM SERPL-SCNC: 3.5 MMOL/L (ref 3.5–5.1)
POTASSIUM SERPL-SCNC: 3.8 MMOL/L (ref 3.5–5.1)
PROT SERPL-MCNC: 5.2 G/DL (ref 6.4–8.2)
PROTHROMBIN TIME: 15.2 SECONDS (ref 11.6–14.8)
RBC # BLD AUTO: 2.64 X10(6)UL
SAO2 % BLD: 100 %
SODIUM BLD-SCNC: 139 MMOL/L (ref 136–145)
SODIUM SERPL-SCNC: 137 MMOL/L (ref 136–145)
SODIUM SERPL-SCNC: 142 MMOL/L (ref 136–145)
WBC # BLD AUTO: 5.6 X10(3) UL (ref 4–11)

## 2022-03-17 PROCEDURE — 99222 1ST HOSP IP/OBS MODERATE 55: CPT | Performed by: INTERNAL MEDICINE

## 2022-03-17 PROCEDURE — 03CK0ZZ EXTIRPATION OF MATTER FROM RIGHT INTERNAL CAROTID ARTERY, OPEN APPROACH: ICD-10-PCS | Performed by: SURGERY

## 2022-03-17 PROCEDURE — 03UK07Z SUPPLEMENT RIGHT INTERNAL CAROTID ARTERY WITH AUTOLOGOUS TISSUE SUBSTITUTE, OPEN APPROACH: ICD-10-PCS | Performed by: SURGERY

## 2022-03-17 PROCEDURE — 06BP0ZZ EXCISION OF RIGHT SAPHENOUS VEIN, OPEN APPROACH: ICD-10-PCS | Performed by: SURGERY

## 2022-03-17 PROCEDURE — 03UH0KZ SUPPLEMENT RIGHT COMMON CAROTID ARTERY WITH NONAUTOLOGOUS TISSUE SUBSTITUTE, OPEN APPROACH: ICD-10-PCS | Performed by: SURGERY

## 2022-03-17 PROCEDURE — 03CM0ZZ EXTIRPATION OF MATTER FROM RIGHT EXTERNAL CAROTID ARTERY, OPEN APPROACH: ICD-10-PCS | Performed by: SURGERY

## 2022-03-17 PROCEDURE — 99223 1ST HOSP IP/OBS HIGH 75: CPT | Performed by: INTERNAL MEDICINE

## 2022-03-17 RX ORDER — NITROGLYCERIN 20 MG/100ML
INJECTION INTRAVENOUS
Status: DISCONTINUED | OUTPATIENT
Start: 2022-03-17 | End: 2022-03-19

## 2022-03-17 RX ORDER — HEPARIN SODIUM 1000 [USP'U]/ML
INJECTION, SOLUTION INTRAVENOUS; SUBCUTANEOUS AS NEEDED
Status: DISCONTINUED | OUTPATIENT
Start: 2022-03-17 | End: 2022-03-17 | Stop reason: SURG

## 2022-03-17 RX ORDER — CLOPIDOGREL BISULFATE 75 MG/1
75 TABLET ORAL DAILY
Status: DISCONTINUED | OUTPATIENT
Start: 2022-03-17 | End: 2022-03-19

## 2022-03-17 RX ORDER — CEFAZOLIN SODIUM/WATER 2 G/20 ML
2 SYRINGE (ML) INTRAVENOUS EVERY 24 HOURS
Status: COMPLETED | OUTPATIENT
Start: 2022-03-18 | End: 2022-03-18

## 2022-03-17 RX ORDER — BUPIVACAINE HYDROCHLORIDE 5 MG/ML
INJECTION, SOLUTION EPIDURAL; INTRACAUDAL AS NEEDED
Status: DISCONTINUED | OUTPATIENT
Start: 2022-03-17 | End: 2022-03-17 | Stop reason: HOSPADM

## 2022-03-17 RX ORDER — POLYETHYLENE GLYCOL 3350 17 G/17G
17 POWDER, FOR SOLUTION ORAL DAILY PRN
Status: DISCONTINUED | OUTPATIENT
Start: 2022-03-17 | End: 2022-03-19

## 2022-03-17 RX ORDER — CEFAZOLIN SODIUM/WATER 2 G/20 ML
SYRINGE (ML) INTRAVENOUS AS NEEDED
Status: DISCONTINUED | OUTPATIENT
Start: 2022-03-17 | End: 2022-03-17 | Stop reason: SURG

## 2022-03-17 RX ORDER — SENNOSIDES 8.6 MG
17.2 TABLET ORAL NIGHTLY PRN
Status: DISCONTINUED | OUTPATIENT
Start: 2022-03-17 | End: 2022-03-19

## 2022-03-17 RX ORDER — NALOXONE HYDROCHLORIDE 0.4 MG/ML
80 INJECTION, SOLUTION INTRAMUSCULAR; INTRAVENOUS; SUBCUTANEOUS AS NEEDED
Status: ACTIVE | OUTPATIENT
Start: 2022-03-17 | End: 2022-03-17

## 2022-03-17 RX ORDER — LIDOCAINE HYDROCHLORIDE 10 MG/ML
INJECTION, SOLUTION EPIDURAL; INFILTRATION; INTRACAUDAL; PERINEURAL AS NEEDED
Status: DISCONTINUED | OUTPATIENT
Start: 2022-03-17 | End: 2022-03-17 | Stop reason: SURG

## 2022-03-17 RX ORDER — HYDROCODONE BITARTRATE AND ACETAMINOPHEN 5; 325 MG/1; MG/1
1 TABLET ORAL EVERY 4 HOURS PRN
Status: DISCONTINUED | OUTPATIENT
Start: 2022-03-17 | End: 2022-03-19

## 2022-03-17 RX ORDER — ASPIRIN 81 MG/1
81 TABLET ORAL DAILY
Status: DISCONTINUED | OUTPATIENT
Start: 2022-03-17 | End: 2022-03-19

## 2022-03-17 RX ORDER — MORPHINE SULFATE 2 MG/ML
1 INJECTION, SOLUTION INTRAMUSCULAR; INTRAVENOUS EVERY 2 HOUR PRN
Status: DISCONTINUED | OUTPATIENT
Start: 2022-03-17 | End: 2022-03-19

## 2022-03-17 RX ORDER — CEFAZOLIN SODIUM 1 G/3ML
INJECTION, POWDER, FOR SOLUTION INTRAMUSCULAR; INTRAVENOUS AS NEEDED
Status: DISCONTINUED | OUTPATIENT
Start: 2022-03-17 | End: 2022-03-17 | Stop reason: SURG

## 2022-03-17 RX ORDER — ONDANSETRON 2 MG/ML
4 INJECTION INTRAMUSCULAR; INTRAVENOUS EVERY 6 HOURS PRN
Status: DISCONTINUED | OUTPATIENT
Start: 2022-03-17 | End: 2022-03-19

## 2022-03-17 RX ORDER — SODIUM CHLORIDE 9 MG/ML
INJECTION, SOLUTION INTRAVENOUS CONTINUOUS
Status: DISCONTINUED | OUTPATIENT
Start: 2022-03-17 | End: 2022-03-18

## 2022-03-17 RX ORDER — ONDANSETRON 2 MG/ML
INJECTION INTRAMUSCULAR; INTRAVENOUS AS NEEDED
Status: DISCONTINUED | OUTPATIENT
Start: 2022-03-17 | End: 2022-03-17 | Stop reason: SURG

## 2022-03-17 RX ORDER — SODIUM CHLORIDE 9 MG/ML
INJECTION, SOLUTION INTRAVENOUS CONTINUOUS PRN
Status: DISCONTINUED | OUTPATIENT
Start: 2022-03-17 | End: 2022-03-17 | Stop reason: SURG

## 2022-03-17 RX ORDER — MORPHINE SULFATE 2 MG/ML
2 INJECTION, SOLUTION INTRAMUSCULAR; INTRAVENOUS EVERY 2 HOUR PRN
Status: DISCONTINUED | OUTPATIENT
Start: 2022-03-17 | End: 2022-03-19

## 2022-03-17 RX ORDER — HYDROCODONE BITARTRATE AND ACETAMINOPHEN 5; 325 MG/1; MG/1
2 TABLET ORAL EVERY 4 HOURS PRN
Status: DISCONTINUED | OUTPATIENT
Start: 2022-03-17 | End: 2022-03-19

## 2022-03-17 RX ORDER — VANCOMYCIN HYDROCHLORIDE 1 G/20ML
INJECTION, POWDER, LYOPHILIZED, FOR SOLUTION INTRAVENOUS AS NEEDED
Status: DISCONTINUED | OUTPATIENT
Start: 2022-03-17 | End: 2022-03-17 | Stop reason: SURG

## 2022-03-17 RX ORDER — LIDOCAINE HYDROCHLORIDE 40 MG/ML
SOLUTION TOPICAL AS NEEDED
Status: DISCONTINUED | OUTPATIENT
Start: 2022-03-17 | End: 2022-03-17 | Stop reason: SURG

## 2022-03-17 RX ORDER — ONDANSETRON 2 MG/ML
4 INJECTION INTRAMUSCULAR; INTRAVENOUS ONCE AS NEEDED
Status: ACTIVE | OUTPATIENT
Start: 2022-03-17 | End: 2022-03-17

## 2022-03-17 RX ORDER — PROTAMINE SULFATE 10 MG/ML
INJECTION, SOLUTION INTRAVENOUS AS NEEDED
Status: DISCONTINUED | OUTPATIENT
Start: 2022-03-17 | End: 2022-03-17 | Stop reason: SURG

## 2022-03-17 RX ORDER — ACETAMINOPHEN 325 MG/1
650 TABLET ORAL EVERY 4 HOURS PRN
Status: DISCONTINUED | OUTPATIENT
Start: 2022-03-17 | End: 2022-03-19

## 2022-03-17 RX ORDER — CARVEDILOL 12.5 MG/1
12.5 TABLET ORAL 2 TIMES DAILY WITH MEALS
Status: DISCONTINUED | OUTPATIENT
Start: 2022-03-17 | End: 2022-03-19

## 2022-03-17 RX ORDER — ATORVASTATIN CALCIUM 10 MG/1
10 TABLET, FILM COATED ORAL NIGHTLY
Status: DISCONTINUED | OUTPATIENT
Start: 2022-03-17 | End: 2022-03-19

## 2022-03-17 RX ORDER — BISACODYL 10 MG
10 SUPPOSITORY, RECTAL RECTAL
Status: DISCONTINUED | OUTPATIENT
Start: 2022-03-17 | End: 2022-03-19

## 2022-03-17 RX ORDER — CISATRACURIUM BESYLATE 2 MG/ML
INJECTION INTRAVENOUS AS NEEDED
Status: DISCONTINUED | OUTPATIENT
Start: 2022-03-17 | End: 2022-03-17 | Stop reason: SURG

## 2022-03-17 RX ORDER — FENOFIBRATE 134 MG/1
134 CAPSULE ORAL DAILY
Status: DISCONTINUED | OUTPATIENT
Start: 2022-03-17 | End: 2022-03-19

## 2022-03-17 RX ORDER — ALLOPURINOL 300 MG/1
300 TABLET ORAL DAILY
Status: DISCONTINUED | OUTPATIENT
Start: 2022-03-17 | End: 2022-03-19

## 2022-03-17 RX ADMIN — LIDOCAINE HYDROCHLORIDE 4 ML: 40 SOLUTION TOPICAL at 07:29:00

## 2022-03-17 RX ADMIN — LIDOCAINE HYDROCHLORIDE 50 MG: 10 INJECTION, SOLUTION EPIDURAL; INFILTRATION; INTRACAUDAL; PERINEURAL at 07:27:00

## 2022-03-17 RX ADMIN — HEPARIN SODIUM 9000 UNITS: 1000 INJECTION, SOLUTION INTRAVENOUS; SUBCUTANEOUS at 09:16:00

## 2022-03-17 RX ADMIN — CEFAZOLIN SODIUM/WATER 2 G: 2 G/20 ML SYRINGE (ML) INTRAVENOUS at 07:43:00

## 2022-03-17 RX ADMIN — SODIUM CHLORIDE: 9 INJECTION, SOLUTION INTRAVENOUS at 07:23:00

## 2022-03-17 RX ADMIN — HEPARIN SODIUM 1000 UNITS: 1000 INJECTION, SOLUTION INTRAVENOUS; SUBCUTANEOUS at 09:49:00

## 2022-03-17 RX ADMIN — VANCOMYCIN HYDROCHLORIDE 1000 MG: 1 INJECTION, POWDER, LYOPHILIZED, FOR SOLUTION INTRAVENOUS at 09:59:00

## 2022-03-17 RX ADMIN — PROTAMINE SULFATE 5 MG: 10 INJECTION, SOLUTION INTRAVENOUS at 11:30:00

## 2022-03-17 RX ADMIN — SODIUM CHLORIDE: 9 INJECTION, SOLUTION INTRAVENOUS at 11:50:00

## 2022-03-17 RX ADMIN — SODIUM CHLORIDE: 9 INJECTION, SOLUTION INTRAVENOUS at 09:59:00

## 2022-03-17 RX ADMIN — CISATRACURIUM BESYLATE 10 MG: 2 INJECTION INTRAVENOUS at 07:27:00

## 2022-03-17 RX ADMIN — HEPARIN SODIUM 1000 UNITS: 1000 INJECTION, SOLUTION INTRAVENOUS; SUBCUTANEOUS at 09:31:00

## 2022-03-17 RX ADMIN — ONDANSETRON 4 MG: 2 INJECTION INTRAMUSCULAR; INTRAVENOUS at 11:08:00

## 2022-03-17 RX ADMIN — CEFAZOLIN SODIUM 2 G: 1 INJECTION, POWDER, FOR SOLUTION INTRAMUSCULAR; INTRAVENOUS at 11:30:00

## 2022-03-17 RX ADMIN — SODIUM CHLORIDE: 9 INJECTION, SOLUTION INTRAVENOUS at 08:42:00

## 2022-03-17 RX ADMIN — PROTAMINE SULFATE 25 MG: 10 INJECTION, SOLUTION INTRAVENOUS at 10:52:00

## 2022-03-17 RX ADMIN — PROTAMINE SULFATE 5 MG: 10 INJECTION, SOLUTION INTRAVENOUS at 11:08:00

## 2022-03-17 NOTE — PLAN OF CARE
Received patient from [facility] for CEA with vein patch. Left arm precautions. Dressing in place with some drainage. A line in place. Nitroglycerin started for high blood pressure. Per Speech eval okay for ice chips but no oral medications. MD notified. Speech to reassess in AM. PRN IV medication given per [facility]. Patient drowsy but alert x 4. Please see epic flowsheets for more detail. Staff will continue to monitor.

## 2022-03-17 NOTE — ANESTHESIA PROCEDURE NOTES
Peripheral IV  Date/Time: 3/17/2022 7:43 AM  Inserted by: Delfino Kc MD    Placement  Needle size: 20 G  Laterality: right  Location: hand  Local anesthetic: none  Site prep: alcohol  Technique: anatomical landmarks  Attempts: 3

## 2022-03-17 NOTE — ANESTHESIA POSTPROCEDURE EVALUATION
[facility] Patient Status:  Inpatient   Age/Gender 68year old female MRN [id number]   [facility] 6NE-A Attending Dianne Cherry MD   Hosp Day # 0 PCP LUZ MARIA RAMACHANDRAN [facility]       Anesthesia Post-op Note    RIGHT CAROTID ENDARTERECTOMY USING VEIN PATCH AND BIOPATCH 1CM X 10CM    Procedure Summary     Date: 03/17/22 Room / Location: [address] [place] [address] 01 / [facility]    Anesthesia Start: 0723 Anesthesia Stop: 1214    Procedure: RIGHT CAROTID ENDARTERECTOMY USING VEIN PATCH AND BIOPATCH 1CM X 10CM (Right ) Diagnosis: (RIGHT CAROTID STENOSIS,)    Surgeons: Dianne Cherry MD Anesthesiologist: Dyan Roberson MD    Anesthesia Type: general ASA Status: 4          Anesthesia Type: general    Vitals Value Taken Time   /65 03/17/22    Temp 97.4 03/17/22   Pulse 80 03/17/22   Resp 14 03/17/22   SpO2 99 % 03/17/22   Vitals shown include unvalidated device data. Patient Location: ICU    Anesthesia Type: general    Airway Patency: patent and extubated    Postop Pain Control: adequate    Mental Status: mildly sedated but able to meaningfully participate in the post-anesthesia evaluation    Nausea/Vomiting: none    Cardiopulmonary/Hydration status: stable euvolemic    Complications: no apparent anesthesia related complications    Postop vital signs: stable    Comments: Patient responding appropriately and moving all extremities. Speaking, wiggles tongue. Complains of sore throat after extubation. Also some mild skin breakdown under left eye at site of protective eye goggles, likely from adhesive. Dental Exam: Unchanged from Preop    Sign out given to ICU staff.

## 2022-03-17 NOTE — ANESTHESIA PROCEDURE NOTES
Airway  Date/Time: 3/17/2022 7:29 AM  Urgency: elective    Airway not difficult    General Information and Staff    Patient location during procedure: OR  Anesthesiologist: Koko Aguirre MD  Performed: anesthesiologist     Indications and Patient Condition  Indications for airway management: anesthesia  Sedation level: deep  Preoxygenated: yes  Patient position: sniffing  Mask difficulty assessment: 1 - vent by mask    Final Airway Details  Final airway type: endotracheal airway      Successful airway: ETT  Cuffed: yes   Successful intubation technique: direct laryngoscopy  Endotracheal tube insertion site: oral  Blade: Behzad  Blade size: #3  ETT size (mm): 7.0    Cormack-Lehane Classification: grade IIA - partial view of glottis  Placement verified by: chest auscultation and capnometry   Cuff volume (mL): 7  Measured from: lips  ETT to lips (cm): 21  Number of attempts at approach: 1  Ventilation between attempts: none  Number of other approaches attempted: 0

## 2022-03-17 NOTE — OPERATIVE REPORT
P{re-op Dx: Bilateral carotid/ vertebral artery disease. ESRD. Post-op Dx: Greater than 80% right carotid stenosis with ulcerated/ calcified 3.5-4cm plaque. Procedure: Right CEA/ Right GSV/ Bovine patch. Surgeon: Azael/ Asst: Anders Mora. [id number]. 700cc crystalloid. Neuro intact post-op .  Discussed on phone with daughter Rome Polk

## 2022-03-17 NOTE — H&P
[facility]    PATIENT'S NAME: Noel Kiran   ATTENDING PHYSICIAN: Citlali Franks M.D. PATIENT ACCOUNT#:   [de-identified]    LOCATION:  [address]  MEDICAL RECORD #:   [id number]       YOB: 1945  ADMISSION DATE:       03/17/2022    HISTORY AND PHYSICAL EXAMINATION    HISTORY OF PRESENT ILLNESS:  This is a 22-year-old white female, end-stage renal disease, with significant bilateral carotid stenosis, right side worse than the left, and bilateral vertebral artery disease, admitted to the hospital for right carotid endarterectomy and a vein patch. The patient carries a diagnosis of leukemia, being followed by Dr. Radha Patricia who is following at [facility], who has cleared her for the surgery saying this is chronic and remission control. The patient has end-stage renal disease on dialysis Monday, Wednesday, and Friday, being followed by Dr. Stephen Liz from [facility]. She has also cleared and approved the patient for surgery. She was initially referred to me by Dr. Ginny Deleon, cardiologist out of [facility] Cardiology, in [facility]. He has cleared her and wants to have the surgery done. The patient's case was discussed on the phone with the whole family gathered around an open phone, and the risks and complications, options not to treat was also discussed with the patient's family. PAST MEDICAL HISTORY:  The patient has a past history of MI and a history of coronary bypass surgery about 6 to 8 years ago at [facility].  She had a questionable TIA in the past.     MEDICATIONS:  Plavix, aspirin, carvedilol, atorvastatin, allopurinol. ALLERGIES:  No known allergies. SOCIAL HISTORY:  She denies any EtOH abuse, drug abuse, or tobacco abuse. She quit smoking over 40 years ago. The patient is single, lives independently, but she has 4 children. REVIEW OF SYSTEMS:   She denies any CVA, TIA, visual field defect, or aphasia.   She denies any hematuria, dysuria, hemoptysis, cough, sputum production, weight loss, fever, chills. No hematemesis or bright red blood per rectum. PHYSICAL EXAMINATION:    GENERAL:  Awake, alert, appropriate, in no acute distress. VITAL SIGNS:  Blood pressure 136/70, heart rate 72, respirations 20. HEENT:  Pupils equal, round. Sclerae clear. Mouth without lesions. LUNGS:  Clear to auscultation. HEART:  Normal, no murmur. EXTREMITIES:  The left arm fistula is functioning above the elbow. Femoral pulses are palpable. The popliteal and pedal diminished bilaterally. ABDOMEN:  Soft, no tenderness or masses. No organomegaly. CT angiogram was reviewed. The peak systolic velocity of over 260 cm/second, ratio close to 4.0 with a CT angiogram showing over 70% stenosis, calcified lesion. On the left, she has a ratio of 2.3. The peak velocity of 200 cm/second, has significant stenosis of both origins of the vertebral arteries. IMPRESSION:  Patient with a history of dyslipidemia, hypertension, end-stage renal disease, with a past history of cancer currently in remission, with significant cerebral vascular disease. The patient's carotid on the right seems to have worsened and discussed with Dr. Shaina Holm and Dr. Kay Garcia concerns for the carotid artery. Although she is on end-stage renal disease, she is a female, there are options for conservative treatment. She saw Dr. Courtney Whiting at [facility], who said not to do the surgery, just to watch, and I told him that was an appropriate option. My concern was the plaque looked pretty ugly on the CT angiogram and it has progressed in disease and she is at higher risk for surgery compared to other people since she is on dialysis. The risk of stroke, death, cardiac complications, bleeding, infection, cranial nerve injury, renal failure, multisystem organ failure were all explained. The risk of balloon angioplasty and stenting either through a femoral or TCAR approach were explained.   At this time, it is either to watch it medically or to do a carotid endarterectomy. Due to concerns of all 4 vessels being diseased and progression of disease, best medical management, and discussion with the other physicians, specifically Dr. Stann Lesch and Dr. Franca Dodd, will do the right carotid endarterectomy with vein patch. The patient's family have been offered another opinion if they wish, and they are aware that this is a higher risk operation than most patients.     Dictated By Federico Garcia M.D.  d: 03/16/2022 11:23:03  t: 03/16/2022 14:28:47  [facility] [id number]  JJW/    cc: Federico Garcia M.D.

## 2022-03-17 NOTE — ANESTHESIA PROCEDURE NOTES
Arterial Line  Performed by: Jimbo York MD  Authorized by: Jimbo York MD     General Information and Staff    Procedure Start:  3/17/2022 7:35 AM  Procedure End:  3/17/2022 7:39 AM  Anesthesiologist:  Jimbo York MD  Performed By:  Anesthesiologist  Patient Location:  OR  Indication: continuous blood pressure monitoring and blood sampling needed    Site Identification: surface landmarks    Preanesthetic Checklist: 2 patient identifiers, IV checked, risks and benefits discussed, monitors and equipment checked, pre-op evaluation, timeout performed, anesthesia consent and sterile technique used    Procedure Details    Catheter Size:  20 G  Catheter Length:  1 and 3/4 inchCatheter Type:  Arrow  Seldinger Technique?: Yes    Laterality:  RightSite:  Radial artery  Site Prep: chlorhexidine  Line Secured:  Wrist Brace, tape and Tegaderm    Assessment    Events: patient tolerated procedure well with no complications      Medications      Additional Comments

## 2022-03-18 LAB
ANION GAP SERPL CALC-SCNC: 6 MMOL/L (ref 0–18)
BUN BLD-MCNC: 44 MG/DL (ref 7–18)
CALCIUM BLD-MCNC: 8.3 MG/DL (ref 8.5–10.1)
CO2 SERPL-SCNC: 33 MMOL/L (ref 21–32)
CREAT BLD-MCNC: 4.84 MG/DL
ERYTHROCYTE [DISTWIDTH] IN BLOOD BY AUTOMATED COUNT: 14.7 %
GLUCOSE BLD-MCNC: 100 MG/DL (ref 70–99)
HCT VFR BLD AUTO: 24.2 %
HGB BLD-MCNC: 8.3 G/DL
MCH RBC QN AUTO: 35.8 PG (ref 26–34)
MCHC RBC AUTO-ENTMCNC: 34.3 G/DL (ref 31–37)
MCV RBC AUTO: 104.3 FL
OSMOLALITY SERPL CALC.SUM OF ELEC: 299 MOSM/KG (ref 275–295)
PLATELET # BLD AUTO: 95 10(3)UL (ref 150–450)
POTASSIUM SERPL-SCNC: 3.9 MMOL/L (ref 3.5–5.1)
RBC # BLD AUTO: 2.32 X10(6)UL
SODIUM SERPL-SCNC: 139 MMOL/L (ref 136–145)
WBC # BLD AUTO: 6 X10(3) UL (ref 4–11)

## 2022-03-18 PROCEDURE — 5A1D70Z PERFORMANCE OF URINARY FILTRATION, INTERMITTENT, LESS THAN 6 HOURS PER DAY: ICD-10-PCS | Performed by: INTERNAL MEDICINE

## 2022-03-18 PROCEDURE — 99233 SBSQ HOSP IP/OBS HIGH 50: CPT | Performed by: INTERNAL MEDICINE

## 2022-03-18 PROCEDURE — 99232 SBSQ HOSP IP/OBS MODERATE 35: CPT | Performed by: INTERNAL MEDICINE

## 2022-03-18 RX ORDER — AMLODIPINE BESYLATE 5 MG/1
5 TABLET ORAL DAILY
Status: DISCONTINUED | OUTPATIENT
Start: 2022-03-18 | End: 2022-03-19

## 2022-03-18 NOTE — PHYSICAL THERAPY NOTE
Physical Therapy    Orders for PT eval received. Pt is currently having dialysis. Will re-attempt as schedule allows.

## 2022-03-18 NOTE — OPERATIVE REPORT
[facility]    PATIENT'S NAME: Konrad Martinez   ATTENDING PHYSICIAN: Ariella Kauffman M.D. OPERATING PHYSICIAN: Ariella Kauffman M.D. PATIENT ACCOUNT#:   [de-identified]    LOCATION:  [address] [place] [address]  MEDICAL RECORD #:   [id number]       YOB: 1945  ADMISSION DATE:       03/17/2022      OPERATION DATE:  03/17/2022    OPERATIVE REPORT    PREOPERATIVE DIAGNOSIS:  Progressive right carotid stenosis with contralateral carotid and bilateral vertebral artery disease with end-stage renal disease. POSTOPERATIVE DIAGNOSIS:  Progressive right carotid stenosis with contralateral carotid and bilateral vertebral artery disease with end-stage renal disease, with greater than 80% right carotid stenosis with severe ulcerative atherosclerotic plaque and calcification. PROCEDURE:  Right carotid endarterectomy with the use of a Sundt shunt and use of a portion of the right great saphenous vein as well as a bovine patch. ASSISTANT:  Monse Wilburn. Chris Gunn, CST, CFA. INDICATIONS:  This is a 59-year-old white female with progressive right carotid stenosis referred to me by Dr. Erin Salguero from [facility] Cardiology. Also, case was discussed with Dr. Willis Cerda who has been following her for dialysis. She has a history of remission of leukemia, being followed by Oncology from [facility]. She had a duplex ultrasound and CT angiogram showing a high-grade right carotid stenosis. She also has contralateral carotid stenosis close to 70% with bilateral severe vertebral artery stenosis at its origin. She had a questionable symptomatic TIA, but was recommended for the surgery due to progression of disease of the vessels. The option of medical management was explained to the patient. The option of balloon angioplasty and stenting through a femoral TCAR was explained, and the open repair was explained.   The risks and complications including death, myocardial infarction, bleeding, infection, graft thrombosis, limb loss, stroke, cranial nerve injury, renal failure, multisystem organ failure, cardiac arrest, and death were explained. Patient understood and agreed. All questions answered. OPERATIVE TECHNIQUE:  The patient was placed supine on the procedure table, underwent general anesthesia. Care was taken to avoid any trauma to the fistula in the left arm. IV started by Anesthesia as well as the art line. Patient did not have a Marin catheter. Received preoperative antibiotics of Ancef and intraoperatively vancomycin. Incision was made in the right groin. The left groin was evaluated and the saphenous vein appeared to be varicosity. On the right side, there was a vein that was available but during exploration it was found that this was a branch of the saphenous vein and the main vein. There was still a segment left but it had been tied off from previous surgery for probably open heart surgery. The thigh and the neck had been prepped and draped in usual sterile technique with Ioban Vi-Drape used to cover the operative field. SCDs on both lower legs. After a time-out was done, incision was made in the right thigh. As previously stated, the saphenous vein was harvested, enough of the segment was there that could bring up most of the patch for the carotid artery. The patient would still need a bovine patch. A 1 cm x 4 to 5 cm length patch was used. The patient, after vein was harvested, had hemostasis obtained of the thigh wound and it was closed eventually with multiple layers of 2-0 Vicryl interrupted and 3-0 Vicryl subcuticular and then with skin staples on the skin. The area was anesthetized with 10 mL of 0.25% Marcaine plain at the end of the procedure. Incision was made anterior to the sternocleidomastoid muscle. Dissection brought down through the subcutaneous tissues and past the platysma to expose the jugular and sternocleidomastoid muscle. The muscle and vein were retracted laterally. Thyroidal vein, facial veins, and small veins tethering down the hypoglossal nerve were all ligated with 4-0 Ethibond ties, hemoclips, and divided. There was an extensive amount of inflammation around the area of the artery and vein. The dissection of the artery was brought down to the area of the omohyoid muscle and the artery was completely soft in this area. Dissection was brought up above this area and it was also very normal, but she had a high bifurcation. Care was taken to avoid any trauma to the carotid bifurcation or to the cranial nerves. A Lafleur retractor was used for exposure. Patient received a bolus of heparin. ACTs were monitored throughout the procedure. After greater than a 5-minute period of time with the blood pressure elevated, a Shay clamp was placed on the internal carotid distally, vessel loop and angled PV clamp on the common carotid proximally, vessel loop on external carotid and superior thyroidal artery, and the carotid bifurcation sharply mobilized upward out of the wound. Arteriotomy was performed on the common carotid artery, extended first proximally then distally through a severely stenotic more than 80% stenosis with an ulcerative atherosclerotic plaque and calcification and it was at least 3 to 3.5 cm of length before it was brought up into normal-appearing almost up to 4 cm intima of the internal carotid artery and beyond. Backbleeding was adequate and, therefore, the endarterectomy was done first since it was going to be impossible to place a shunt without causing trauma. The plaque was sharply transected proximally. It tethered off the internal carotid distally without any difficulty and an eversion endarterectomy of the external carotid was performed with sharp transection. The patient had the area inspected quickly with any loose debris removed with fine pickups and heparinized saline solution.     The Sundt shunt was then placed first distally with backbleeding and then proximally with care being taken to prevent any embolization of air or debris. The total clamp time prior to placement of the shunt was approximately 5 to 6 minutes. With the shunt in place, the remaining portion of the endarterectomy was inspected. Any further debris was removed with fine pickups and heparinized saline solution. Tacking was done proximally with 6-0 Prolene suture. It   was not necessary distally. Some tacking was done in the middle portion just to make sure that the wall of the artery was still strong enough due to the plaque burrowing into the wall of the artery. The saphenous vein from the thigh was then brought up and anastomosed to the internal carotid artery and brought all the way to just proximal to the bifurcation. A bovine patch was anastomosed to the common carotid proximally, and then brought up to the area of the vein patch. All this was done with 6-0 Prolene suture. After completion of the patch below and the vein patch above with the vein patch bovine patch being completed, the shunt was re-clamped, removed first distally with reapplication of a Shay clamp with good backbleeding and then proximally with good forward bleeding and reapplication of an angled PV clamp. The area was flushed copiously with heparinized saline. Backbleeding was allowed from the external carotid artery. At the completion of this, the patient then had the remaining portion of the anastomosis completed with trimming of the bovine graft and anastomosis to the area of the vein with an oblique incision, all with 6-0 Prolene suture. Prior to completion of this, all vessels were flushed, irrigated, and then the anastomosis completed to make sure all the air was out of the artery.   With completion of the anastomosis and control of the internal carotid bifurcation, flow was established from the common up the external carotid artery and after an appropriate period of time up the internal carotid artery. Total clamp time after removal of the shunt was approximately 2 minutes. Hemostasis was obtained with 6-0 and 7-0 Prolene sutures, hemoclips, Bovie coagulation, Gelfoam and thrombin, FloSeal x2, and protamine. ACTs were monitored to make sure the protamine had reversed. The patient had some oozing. It took almost 45 minutes to 50 minutes to make sure the incision was dry. Once this was done, the patient had 2-0 Vicryl for the platysma, 3-0 Vicryl subcuticular, and the wound was anesthetized with 10 mL of 0.25% Marcaine plain. The estimated blood loss was between 250 to 300 mL. Sponge and instrument counts were correct. Patient received approximately 700 mL crystalloid. Pathology specimen included the plaque. FINAL DIAGNOSIS:  High-grade right carotid stenosis with ulcerated lesion with patient having a possible transient ischemic attack treated with right carotid endarterectomy with portion of a right great saphenous vein patch and a bovine patch.     Dictated By Ihsan Rascon M.D.  d: 03/17/2022 11:58:28  t: 03/17/2022 16:12:04  [facility] [id number]  JJW/

## 2022-03-18 NOTE — PLAN OF CARE
Assumed care of patient at 0700. Patient resting in bed. No apparent distress. Neuro status intact. SR on the monitor. VSS. On ntg gtt. A-line in place. Dialysis RN at bedside setting up for patient's HD treatment.

## 2022-03-18 NOTE — PROGRESS NOTES
No complaints/ on dialysis. Neuro intact. Wounds clean/dry with trachea midline. Swallowing well. Instructed wound care/ activity/ follow up.  Instructed on swallowing

## 2022-03-18 NOTE — PLAN OF CARE
Assumed care of [place] around 1915. Pt is alert and oriented and moving all extremities. Neuro status checked every 4 hours, remains intact. Pt in a NSR with a RBBB per tele. Pulses palpable and a murmur is heard on auscultation. Blood pressure kept within parameters with titration of Nitroglycerin, see MAR- going off arterial line for titrations. Lungs diminished, pt requiring supplemental O2 to keep SpO2 above 89%- incentive spirometer encouraged. Pt nauseous at beginning of shift, had one emesis occurrence. R neck incision intact, small drainage on dressing overnight. RLE ace wrapped. Pain managed with PRN morphine. Pt kept NPO per order. Plan of dialysis in AM. Updated pt on plan of care and continued to closely monitor.       Problem: Patient/Family Goals  Goal: Patient/Family Long Term Goal  Description: Patient's Long Term Goal: Discharge home    Interventions:  - Promote independence when appropriate  - encourage ambulation/mobility  - include pt in plan of care  - See additional Care Plan goals for specific interventions  Outcome: Progressing  Goal: Patient/Family Short Term Goal  Description: Patient's Short Term Goal: Pain management    Interventions:   - Frequently assess pain levels  - provide pharmacologic and nonpharmacologic interventions  - See additional Care Plan goals for specific interventions  Outcome: Progressing     Problem: SKIN/TISSUE INTEGRITY - ADULT  Goal: Incision(s), wounds(s) or drain site(s) healing without S/S of infection  Description: INTERVENTIONS:  - Assess and document risk factors for pressure ulcer development  - Assess and document skin integrity  - Assess and document dressing/incision, wound bed, drain sites and surrounding tissue  - Implement wound care per orders  - Initiate isolation precautions as appropriate  - Initiate Pressure Ulcer prevention bundle as indicated  Outcome: Progressing

## 2022-03-19 VITALS
DIASTOLIC BLOOD PRESSURE: 74 MMHG | SYSTOLIC BLOOD PRESSURE: 137 MMHG | HEIGHT: 60 IN | HEART RATE: 75 BPM | OXYGEN SATURATION: 94 % | WEIGHT: 163.38 LBS | RESPIRATION RATE: 17 BRPM | BODY MASS INDEX: 32.08 KG/M2 | TEMPERATURE: 98 F

## 2022-03-19 PROCEDURE — 99232 SBSQ HOSP IP/OBS MODERATE 35: CPT | Performed by: INTERNAL MEDICINE

## 2022-03-19 RX ORDER — AMLODIPINE BESYLATE 5 MG/1
5 TABLET ORAL DAILY
Qty: 30 TABLET | Refills: 1 | Status: SHIPPED | OUTPATIENT
Start: 2022-03-20

## 2022-03-19 RX ORDER — HYDROCODONE BITARTRATE AND ACETAMINOPHEN 5; 325 MG/1; MG/1
1 TABLET ORAL EVERY 4 HOURS PRN
Qty: 20 TABLET | Refills: 0 | Status: SHIPPED | OUTPATIENT
Start: 2022-03-19

## 2022-03-19 NOTE — PLAN OF CARE
Assumed care of pt @ 1930. Rec'd pt in chair, playing cards with her daughter. Pt. Asymptomatic @ this time. VSS, afebrile, in no acute distress.   R. Neck incision c/d/I.

## 2022-03-19 NOTE — PLAN OF CARE
Seen by all services, able to go home. Daughter coming to bring mom home, meds clarified with both Vangie Cruz and DEYANIRA. Cont meds as in hospital. Will follow up with MD in 10 days post op. Pt and daughter no further questions, discharged to home      Problem: Patient/Family Goals  Goal: Patient/Family Long Term Goal  Description: Patient's Long Term Goal: Discharge home    Interventions:  - Promote independence when appropriate  - encourage ambulation/mobility  - include pt in plan of care  - See additional Care Plan goals for specific interventions  Outcome: Progressing  Goal: Patient/Family Short Term Goal  Description: Patient's Short Term Goal: Pain management    Interventions:   - Frequently assess pain levels  - provide pharmacologic and nonpharmacologic interventions  - See additional Care Plan goals for specific interventions  Outcome: Progressing     Problem: SKIN/TISSUE INTEGRITY - ADULT  Goal: Incision(s), wounds(s) or drain site(s) healing without S/S of infection  Description: INTERVENTIONS:  - Assess and document risk factors for pressure ulcer development  - Assess and document skin integrity  - Assess and document dressing/incision, wound bed, drain sites and surrounding tissue  - Implement wound care per orders  - Initiate isolation precautions as appropriate  - Initiate Pressure Ulcer prevention bundle as indicated  Outcome: Progressing     Problem: CARDIOVASCULAR - ADULT  Goal: Maintains optimal cardiac output and hemodynamic stability  Description: INTERVENTIONS:  - Monitor vital signs, rhythm, and trends  - Monitor for bleeding, hypotension and signs of decreased cardiac output  - Evaluate effectiveness of vasoactive medications to optimize hemodynamic stability  - Monitor arterial and/or venous puncture sites for bleeding and/or hematoma  - Assess quality of pulses, skin color and temperature  - Assess for signs of decreased coronary artery perfusion - ex.  Angina  - Evaluate fluid balance, assess for edema, trend weights  Outcome: Progressing  Goal: Absence of cardiac arrhythmias or at baseline  Description: INTERVENTIONS:  - Continuous cardiac monitoring, monitor vital signs, obtain 12 lead EKG if indicated  - Evaluate effectiveness of antiarrhythmic and heart rate control medications as ordered  - Initiate emergency measures for life threatening arrhythmias  - Monitor electrolytes and administer replacement therapy as ordered  Outcome: Progressing

## 2022-03-19 NOTE — PROGRESS NOTES
No complaints- wound clean/dry. Neuro intact. Instructed wound care/ activity follow up.  Will remove staples in office in 2 weeks

## 2022-03-20 LAB — BLOOD TYPE BARCODE: 5100

## 2022-03-20 NOTE — DISCHARGE SUMMARY
[facility]    PATIENT'S NAME: Tolu Rocha   ATTENDING PHYSICIAN: Porsha Veliz M.D. PATIENT ACCOUNT#:   [de-identified]    LOCATION:  [address] [place] [address]  MEDICAL RECORD #:   [id number]       YOB: 1945  ADMISSION DATE:       03/17/2022      DISCHARGE DATE:  03/19/2022    DISCHARGE SUMMARY    HISTORY AND HOSPITAL COURSE:  This is a 60-year-old white female with end-stage renal disease with significant right carotid stenosis that was over 80%, severe left carotid stenosis between 60% to 70%, and severe bilateral greater than 80% stenosis of her tibial arteries, who underwent a right carotid endarterectomy and vein patch. The patient was seen and evaluated by Dr. Dereck Garcia, as well as Dr. Justin Tomas from [facility] Cardiology at [facility]. The patient tolerated the procedure well. She had a portion of the right great saphenous vein as well as a portion of bovine patch on the repair. Neurologically, she is intact. The wounds are clean and dry. She is tolerating diet, eating well, and ambulating. She is cleared by Dr. Juan Rodriguez from Nephrology as well as Dr. Vangie Dumont from Cardiology and was instructed on wound care, activity, and followup. She is aware to keep the wound clean and dry due to the bovine patch on a portion of the carotid artery. All questions answered for the patient. Zafar Cocking will be removed from the right groin wound in a week or 2.       Dictated By Porsha Veliz M.D.  d: 03/19/2022 12:11:41  t: 03/19/2022 12:52:26  [facility] [id number]  [facility]/

## 2022-06-09 ENCOUNTER — HOSPITAL ENCOUNTER (OUTPATIENT)
Dept: ULTRASOUND IMAGING | Age: 77
Discharge: HOME | End: 2022-06-09
Attending: SURGERY
Payer: MEDICARE

## 2022-06-09 DIAGNOSIS — I65.22 LEFT CAROTID STENOSIS: ICD-10-CM

## 2022-06-09 PROCEDURE — 93880 EXTRACRANIAL BILAT STUDY: CPT | Performed by: SURGERY

## 2023-08-31 ENCOUNTER — HOSPITAL ENCOUNTER (OUTPATIENT)
Dept: GENERAL RADIOLOGY | Facility: HOSPITAL | Age: 78
Discharge: HOME | End: 2023-08-31
Attending: NURSE PRACTITIONER
Payer: MEDICARE

## 2023-08-31 ENCOUNTER — OFFICE VISIT (OUTPATIENT)
Dept: WOUND CARE | Facility: HOSPITAL | Age: 78
End: 2023-08-31
Attending: NURSE PRACTITIONER
Payer: MEDICARE

## 2023-08-31 VITALS
HEART RATE: 87 BPM | SYSTOLIC BLOOD PRESSURE: 114 MMHG | WEIGHT: 138 LBS | HEIGHT: 60 IN | BODY MASS INDEX: 27.09 KG/M2 | TEMPERATURE: 98 F | RESPIRATION RATE: 14 BRPM | DIASTOLIC BLOOD PRESSURE: 85 MMHG

## 2023-08-31 DIAGNOSIS — L97.522 NON-PRESSURE CHRONIC ULCER OF OTHER PART OF LEFT FOOT WITH FAT LAYER EXPOSED (HCC): ICD-10-CM

## 2023-08-31 DIAGNOSIS — M79.671 ISCHEMIC PAIN OF FOOT, RIGHT: ICD-10-CM

## 2023-08-31 DIAGNOSIS — I96 GANGRENE OF TOE OF RIGHT FOOT (HCC): Primary | ICD-10-CM

## 2023-08-31 DIAGNOSIS — I99.8 ISCHEMIC PAIN OF FOOT, RIGHT: ICD-10-CM

## 2023-08-31 DIAGNOSIS — L97.512 NON-PRESSURE CHRONIC ULCER OF OTHER PART OF RIGHT FOOT WITH FAT LAYER EXPOSED (HCC): ICD-10-CM

## 2023-08-31 PROCEDURE — 99214 OFFICE O/P EST MOD 30 MIN: CPT

## 2023-08-31 PROCEDURE — 73630 X-RAY EXAM OF FOOT: CPT | Performed by: NURSE PRACTITIONER

## 2023-08-31 RX ORDER — ACETAMINOPHEN 325 MG/1
325 TABLET ORAL EVERY 6 HOURS PRN
COMMUNITY

## 2023-08-31 RX ORDER — POLYETHYLENE GLYCOL 3350 17 G/17G
17 POWDER, FOR SOLUTION ORAL DAILY
COMMUNITY

## 2023-09-28 ENCOUNTER — OFFICE VISIT (OUTPATIENT)
Dept: WOUND CARE | Facility: HOSPITAL | Age: 78
End: 2023-09-28
Attending: NURSE PRACTITIONER
Payer: MEDICARE

## 2023-09-28 ENCOUNTER — TELEPHONE (OUTPATIENT)
Dept: WOUND CARE | Facility: HOSPITAL | Age: 78
End: 2023-09-28

## 2023-09-28 ENCOUNTER — HOSPITAL ENCOUNTER (INPATIENT)
Facility: HOSPITAL | Age: 78
LOS: 8 days | Discharge: HOME | End: 2023-10-06
Attending: EMERGENCY MEDICINE | Admitting: STUDENT IN AN ORGANIZED HEALTH CARE EDUCATION/TRAINING PROGRAM
Payer: MEDICARE

## 2023-09-28 VITALS
RESPIRATION RATE: 16 BRPM | TEMPERATURE: 98 F | DIASTOLIC BLOOD PRESSURE: 76 MMHG | HEART RATE: 75 BPM | SYSTOLIC BLOOD PRESSURE: 127 MMHG

## 2023-09-28 DIAGNOSIS — M25.374 FOOT JOINT INSTABILITY, RIGHT: ICD-10-CM

## 2023-09-28 DIAGNOSIS — I96 GANGRENE OF TOE OF RIGHT FOOT (HCC): Primary | ICD-10-CM

## 2023-09-28 DIAGNOSIS — M79.671 ISCHEMIC PAIN OF FOOT, RIGHT: ICD-10-CM

## 2023-09-28 DIAGNOSIS — L97.516 NON-PRESSURE CHRONIC ULCER OF OTHER PART OF RIGHT FOOT WITH BONE INVOLVEMENT WITHOUT EVIDENCE OF NECROSIS (HCC): ICD-10-CM

## 2023-09-28 DIAGNOSIS — M86.9 OSTEOMYELITIS, UNSPECIFIED SITE, UNSPECIFIED TYPE (HCC): Primary | ICD-10-CM

## 2023-09-28 DIAGNOSIS — M86.9 OSTEOMYELITIS OF SECOND TOE OF RIGHT FOOT (HCC): ICD-10-CM

## 2023-09-28 DIAGNOSIS — I99.8 ISCHEMIC PAIN OF FOOT, RIGHT: ICD-10-CM

## 2023-09-28 DIAGNOSIS — N18.6 ESRD (END STAGE RENAL DISEASE) (HCC): ICD-10-CM

## 2023-09-28 DIAGNOSIS — L97.522 NON-PRESSURE CHRONIC ULCER OF OTHER PART OF LEFT FOOT WITH FAT LAYER EXPOSED (HCC): ICD-10-CM

## 2023-09-28 DIAGNOSIS — L97.519 ULCER OF RIGHT FOOT, UNSPECIFIED ULCER STAGE (HCC): ICD-10-CM

## 2023-09-28 LAB
ALBUMIN SERPL-MCNC: 2.5 G/DL (ref 3.4–5)
ALBUMIN/GLOB SERPL: 0.7 {RATIO} (ref 1–2)
ALP LIVER SERPL-CCNC: 122 U/L
ALT SERPL-CCNC: 32 U/L
ANION GAP SERPL CALC-SCNC: 10 MMOL/L (ref 0–18)
AST SERPL-CCNC: 40 U/L (ref 15–37)
BASOPHILS # BLD AUTO: 0.01 X10(3) UL (ref 0–0.2)
BASOPHILS NFR BLD AUTO: 0.1 %
BILIRUB SERPL-MCNC: 0.4 MG/DL (ref 0.1–2)
BUN BLD-MCNC: 58 MG/DL (ref 7–18)
CALCIUM BLD-MCNC: 8.6 MG/DL (ref 8.5–10.1)
CHLORIDE SERPL-SCNC: 97 MMOL/L (ref 98–112)
CO2 SERPL-SCNC: 28 MMOL/L (ref 21–32)
CREAT BLD-MCNC: 4.44 MG/DL
EGFRCR SERPLBLD CKD-EPI 2021: 10 ML/MIN/1.73M2 (ref 60–?)
EOSINOPHIL # BLD AUTO: 0.07 X10(3) UL (ref 0–0.7)
EOSINOPHIL NFR BLD AUTO: 0.9 %
ERYTHROCYTE [DISTWIDTH] IN BLOOD BY AUTOMATED COUNT: 17.8 %
GLOBULIN PLAS-MCNC: 3.5 G/DL (ref 2.8–4.4)
GLUCOSE BLD-MCNC: 106 MG/DL (ref 70–99)
HCT VFR BLD AUTO: 27.9 %
HGB BLD-MCNC: 9.7 G/DL
IMM GRANULOCYTES # BLD AUTO: 0.05 X10(3) UL (ref 0–1)
IMM GRANULOCYTES NFR BLD: 0.6 %
LACTATE SERPL-SCNC: 1.7 MMOL/L (ref 0.4–2)
LYMPHOCYTES # BLD AUTO: 1.52 X10(3) UL (ref 1–4)
LYMPHOCYTES NFR BLD AUTO: 19.4 %
MCH RBC QN AUTO: 37.5 PG (ref 26–34)
MCHC RBC AUTO-ENTMCNC: 34.8 G/DL (ref 31–37)
MCV RBC AUTO: 107.7 FL
MONOCYTES # BLD AUTO: 0.88 X10(3) UL (ref 0.1–1)
MONOCYTES NFR BLD AUTO: 11.2 %
NEUTROPHILS # BLD AUTO: 5.31 X10 (3) UL (ref 1.5–7.7)
NEUTROPHILS # BLD AUTO: 5.31 X10(3) UL (ref 1.5–7.7)
NEUTROPHILS NFR BLD AUTO: 67.8 %
OSMOLALITY SERPL CALC.SUM OF ELEC: 297 MOSM/KG (ref 275–295)
PLATELET # BLD AUTO: 139 10(3)UL (ref 150–450)
POTASSIUM SERPL-SCNC: 3.9 MMOL/L (ref 3.5–5.1)
PROT SERPL-MCNC: 6 G/DL (ref 6.4–8.2)
RBC # BLD AUTO: 2.59 X10(6)UL
SODIUM SERPL-SCNC: 135 MMOL/L (ref 136–145)
WBC # BLD AUTO: 7.8 X10(3) UL (ref 4–11)

## 2023-09-28 PROCEDURE — 99223 1ST HOSP IP/OBS HIGH 75: CPT | Performed by: STUDENT IN AN ORGANIZED HEALTH CARE EDUCATION/TRAINING PROGRAM

## 2023-09-28 PROCEDURE — 99215 OFFICE O/P EST HI 40 MIN: CPT | Performed by: NURSE PRACTITIONER

## 2023-09-28 RX ORDER — SENNOSIDES 8.6 MG
17.2 TABLET ORAL NIGHTLY PRN
Status: DISCONTINUED | OUTPATIENT
Start: 2023-09-28 | End: 2023-10-07

## 2023-09-28 RX ORDER — MELATONIN
3 NIGHTLY PRN
Status: DISCONTINUED | OUTPATIENT
Start: 2023-09-28 | End: 2023-10-07

## 2023-09-28 RX ORDER — POLYETHYLENE GLYCOL 3350 17 G/17G
17 POWDER, FOR SOLUTION ORAL DAILY PRN
Status: DISCONTINUED | OUTPATIENT
Start: 2023-09-28 | End: 2023-10-07

## 2023-09-28 RX ORDER — ONDANSETRON 2 MG/ML
4 INJECTION INTRAMUSCULAR; INTRAVENOUS EVERY 6 HOURS PRN
Status: DISCONTINUED | OUTPATIENT
Start: 2023-09-28 | End: 2023-10-07

## 2023-09-28 RX ORDER — METOCLOPRAMIDE HYDROCHLORIDE 5 MG/ML
5 INJECTION INTRAMUSCULAR; INTRAVENOUS EVERY 8 HOURS PRN
Status: DISCONTINUED | OUTPATIENT
Start: 2023-09-28 | End: 2023-10-07

## 2023-09-28 RX ORDER — ACETAMINOPHEN 500 MG
500 TABLET ORAL EVERY 4 HOURS PRN
Status: DISCONTINUED | OUTPATIENT
Start: 2023-09-28 | End: 2023-10-07

## 2023-09-28 RX ORDER — BISACODYL 10 MG
10 SUPPOSITORY, RECTAL RECTAL
Status: DISCONTINUED | OUTPATIENT
Start: 2023-09-28 | End: 2023-10-07

## 2023-09-28 RX ORDER — ECHINACEA PURPUREA EXTRACT 125 MG
1 TABLET ORAL
Status: DISCONTINUED | OUTPATIENT
Start: 2023-09-28 | End: 2023-10-07

## 2023-09-28 RX ORDER — ENOXAPARIN SODIUM 100 MG/ML
40 INJECTION SUBCUTANEOUS DAILY
Status: DISCONTINUED | OUTPATIENT
Start: 2023-09-29 | End: 2023-09-29 | Stop reason: DRUGHIGH

## 2023-09-28 RX ORDER — VANCOMYCIN HYDROCHLORIDE
25 ONCE
Status: COMPLETED | OUTPATIENT
Start: 2023-09-28 | End: 2023-09-28

## 2023-09-28 RX ORDER — SODIUM CHLORIDE 9 MG/ML
INJECTION, SOLUTION INTRAVENOUS CONTINUOUS
Status: DISCONTINUED | OUTPATIENT
Start: 2023-09-28 | End: 2023-09-29

## 2023-09-28 NOTE — PATIENT INSTRUCTIONS
Please return: after your admission to [facility]    Need to go to [facility] ED today. Let them know that you have exposed bone of your right 2nd digit and your metatarsal on your right plantar foot. You will need MRI, vascular ultrasound, lab work and admission with consults from:  vascular, ID, and podiatric surgery. Patient discharge and wound care instructions  Sylvester Mon  9/28/2023       You may shower with protection of the wound (ie a cast cover or similar). Changing your dressing:              Wash your hands with soap and water. Remove old dressing, discard into plastic bag and place into trash. Cleanse the wound with Normal Saline or specified wound cleanser prior to applying a clean dressing using gauze sponges, not tissues or cotton balls. Pat dry using gauze sponges, not tissue or cotton balls. Bleeding is ok. APPLY Dressing:  silver alginate>gauze    Offloading  We have provided you with an off-loading shoe and insert. Please wear this shoe at all times as instructed to you. If the shoe is on your right foot do not drive with this shoe on. Will order Defender boot    Nutrition and blood sugar control:  Focus on the following:  Protein: Meats, beans, eggs, milk and yogurt particularly [place] yogurt), tofu, soy nuts, soy protein products (Follow the protein handout in your welcome folder)  Vitamin C: Citrus fruits and juices, strawberries, tomatoes, tomato juice, peppers, baked potatoes, spinach, broccoli, cauliflower, [place] sprouts, cabbage  Vitamin A: Dark green, leafy vegetables, orange or yellow vegetables, cantaloupe, fortified dairy products, liver, fortified cereals  Zinc: Fortified cereals, red meats, seafood  Consider supplementing with Noble by [employer] OR DPP dipeptic power by ndlabs.com. both products can be purchased on [employer] (These are essential branch chain amino acids that help with tissue building and wound healing).    When your blood sugar is consistently elevated greater than 180 your body can't heal or fight infection. Concerns:  Signs of infection may include the following:  Increase in redness  Red \"streaks\" from wound  Increase in swelling  Fever  Unusual odor  Change in the amount of wound drainage     Should you experience any significant changes in your wound(s) or have any questions regarding your home care instructions please contact the wound center [facility] @ [phone] If after regular business hours, please call your family doctor or local emergency room. The treatment plan has been discussed at length between you and your provider. Follow all instructions carefully, it is very important. If you do not follow all instructions you are at risk of your wound not healing, infection, possible loss of limb and even loss of life.

## 2023-09-28 NOTE — TELEPHONE ENCOUNTER
Pt called stating she no longer wants to go to [facility] and will like to be admitted to [facility]. Provider/CM made aware - advised pt go to ER. She verbalized understanding.

## 2023-09-28 NOTE — PROGRESS NOTES
.Weekly Wound Education Note    Teaching Provided To: Patient; Family  Training Topics: Dressing;Cleasing and general instructions; Discharge instructions  Training Method: Explain/Verbal;Written  Training Response: Patient responds and understands        Notes: Wounds not improving. Recommended pt go to ER. Pt wants to try to go to [facility] ER. Wounds dressed with silver alginate, gauze and tape. Darco shoe with peg liner made.  Will order defender boot for when pt gets out of hospital.

## 2023-09-28 NOTE — ED INITIAL ASSESSMENT (HPI)
Patient has wounds on right foot, sees wound care. Wound care sent to ER with note stating \"Need to go to [facility] ED today. Let them know you have exposed bone of your right 2nd digit and your right plantar foot. You will need MRI, vascular ultrasound, lab work and admission with consults: vascular, ID, podiatric surgery. \" No fever.

## 2023-09-29 ENCOUNTER — APPOINTMENT (OUTPATIENT)
Dept: ULTRASOUND IMAGING | Facility: HOSPITAL | Age: 78
End: 2023-09-29
Attending: INTERNAL MEDICINE
Payer: MEDICARE

## 2023-09-29 ENCOUNTER — APPOINTMENT (OUTPATIENT)
Dept: GENERAL RADIOLOGY | Facility: HOSPITAL | Age: 78
End: 2023-09-29
Attending: PODIATRIST
Payer: MEDICARE

## 2023-09-29 ENCOUNTER — APPOINTMENT (OUTPATIENT)
Dept: MRI IMAGING | Facility: HOSPITAL | Age: 78
End: 2023-09-29
Attending: INTERNAL MEDICINE
Payer: MEDICARE

## 2023-09-29 PROBLEM — C91.10 CLL (CHRONIC LYMPHOCYTIC LEUKEMIA) (HCC): Status: ACTIVE | Noted: 2023-09-29

## 2023-09-29 PROBLEM — C91.10 CLL (CHRONIC LYMPHOCYTIC LEUKEMIA) (HCC): Status: ACTIVE | Noted: 2023-01-01

## 2023-09-29 LAB
ANION GAP SERPL CALC-SCNC: 9 MMOL/L (ref 0–18)
BASOPHILS # BLD AUTO: 0.03 X10(3) UL (ref 0–0.2)
BASOPHILS NFR BLD AUTO: 0.5 %
BUN BLD-MCNC: 62 MG/DL (ref 7–18)
CALCIUM BLD-MCNC: 8.1 MG/DL (ref 8.5–10.1)
CHLORIDE SERPL-SCNC: 100 MMOL/L (ref 98–112)
CO2 SERPL-SCNC: 26 MMOL/L (ref 21–32)
CREAT BLD-MCNC: 4.62 MG/DL
CRP SERPL-MCNC: 2.64 MG/DL (ref ?–0.3)
EGFRCR SERPLBLD CKD-EPI 2021: 9 ML/MIN/1.73M2 (ref 60–?)
EOSINOPHIL # BLD AUTO: 0.43 X10(3) UL (ref 0–0.7)
EOSINOPHIL NFR BLD AUTO: 7 %
ERYTHROCYTE [DISTWIDTH] IN BLOOD BY AUTOMATED COUNT: 17.9 %
ERYTHROCYTE [SEDIMENTATION RATE] IN BLOOD: 1 MM/HR
GLUCOSE BLD-MCNC: 114 MG/DL (ref 70–99)
HBV SURFACE AB SER QL: REACTIVE
HBV SURFACE AB SERPL IA-ACNC: 129.82 MIU/ML
HBV SURFACE AG SER-ACNC: 0.11 [IU]/L
HBV SURFACE AG SERPL QL IA: NONREACTIVE
HCT VFR BLD AUTO: 25.9 %
HGB BLD-MCNC: 8.8 G/DL
IMM GRANULOCYTES # BLD AUTO: 0.04 X10(3) UL (ref 0–1)
IMM GRANULOCYTES NFR BLD: 0.7 %
LYMPHOCYTES # BLD AUTO: 1.37 X10(3) UL (ref 1–4)
LYMPHOCYTES NFR BLD AUTO: 22.3 %
MCH RBC QN AUTO: 36.8 PG (ref 26–34)
MCHC RBC AUTO-ENTMCNC: 34 G/DL (ref 31–37)
MCV RBC AUTO: 108.4 FL
MONOCYTES # BLD AUTO: 0.7 X10(3) UL (ref 0.1–1)
MONOCYTES NFR BLD AUTO: 11.4 %
NEUTROPHILS # BLD AUTO: 3.58 X10 (3) UL (ref 1.5–7.7)
NEUTROPHILS # BLD AUTO: 3.58 X10(3) UL (ref 1.5–7.7)
NEUTROPHILS NFR BLD AUTO: 58.1 %
OSMOLALITY SERPL CALC.SUM OF ELEC: 298 MOSM/KG (ref 275–295)
PLATELET # BLD AUTO: 123 10(3)UL (ref 150–450)
POTASSIUM SERPL-SCNC: 3.5 MMOL/L (ref 3.5–5.1)
RBC # BLD AUTO: 2.39 X10(6)UL
SODIUM SERPL-SCNC: 135 MMOL/L (ref 136–145)
WBC # BLD AUTO: 6.2 X10(3) UL (ref 4–11)

## 2023-09-29 PROCEDURE — 99223 1ST HOSP IP/OBS HIGH 75: CPT | Performed by: INTERNAL MEDICINE

## 2023-09-29 PROCEDURE — 5A1D70Z PERFORMANCE OF URINARY FILTRATION, INTERMITTENT, LESS THAN 6 HOURS PER DAY: ICD-10-PCS | Performed by: INTERNAL MEDICINE

## 2023-09-29 PROCEDURE — 93926 LOWER EXTREMITY STUDY: CPT | Performed by: INTERNAL MEDICINE

## 2023-09-29 PROCEDURE — 73630 X-RAY EXAM OF FOOT: CPT | Performed by: PODIATRIST

## 2023-09-29 PROCEDURE — 73718 MRI LOWER EXTREMITY W/O DYE: CPT | Performed by: INTERNAL MEDICINE

## 2023-09-29 PROCEDURE — 99233 SBSQ HOSP IP/OBS HIGH 50: CPT | Performed by: HOSPITALIST

## 2023-09-29 RX ORDER — CEFAZOLIN SODIUM/WATER 2 G/20 ML
2 SYRINGE (ML) INTRAVENOUS EVERY 24 HOURS
Status: DISCONTINUED | OUTPATIENT
Start: 2023-09-29 | End: 2023-10-07

## 2023-09-29 RX ORDER — HEPARIN SODIUM 5000 [USP'U]/ML
5000 INJECTION, SOLUTION INTRAVENOUS; SUBCUTANEOUS EVERY 8 HOURS SCHEDULED
Status: DISCONTINUED | OUTPATIENT
Start: 2023-09-29 | End: 2023-10-07

## 2023-09-29 RX ORDER — ALLOPURINOL 300 MG/1
300 TABLET ORAL DAILY
Status: DISCONTINUED | OUTPATIENT
Start: 2023-09-29 | End: 2023-10-07

## 2023-09-29 RX ORDER — HYDROCODONE BITARTRATE AND ACETAMINOPHEN 5; 325 MG/1; MG/1
1 TABLET ORAL EVERY 6 HOURS PRN
Status: DISCONTINUED | OUTPATIENT
Start: 2023-09-29 | End: 2023-10-07

## 2023-09-29 RX ORDER — CALCIUM ACETATE 667 MG/1
1 CAPSULE ORAL
Status: DISCONTINUED | OUTPATIENT
Start: 2023-09-29 | End: 2023-10-07

## 2023-09-29 RX ORDER — CARVEDILOL 12.5 MG/1
25 TABLET ORAL 2 TIMES DAILY WITH MEALS
Status: DISCONTINUED | OUTPATIENT
Start: 2023-09-29 | End: 2023-10-01

## 2023-09-29 RX ORDER — LIDOCAINE AND PRILOCAINE 25; 25 MG/G; MG/G
CREAM TOPICAL ONCE
Status: COMPLETED | OUTPATIENT
Start: 2023-09-29 | End: 2023-09-29

## 2023-09-29 RX ORDER — CLOPIDOGREL BISULFATE 75 MG/1
75 TABLET ORAL DAILY
Status: DISCONTINUED | OUTPATIENT
Start: 2023-09-29 | End: 2023-10-07

## 2023-09-29 RX ORDER — ATORVASTATIN CALCIUM 10 MG/1
10 TABLET, FILM COATED ORAL NIGHTLY
Status: DISCONTINUED | OUTPATIENT
Start: 2023-09-29 | End: 2023-10-07

## 2023-09-29 RX ORDER — ASPIRIN 81 MG/1
81 TABLET ORAL DAILY
Status: DISCONTINUED | OUTPATIENT
Start: 2023-09-29 | End: 2023-10-07

## 2023-09-29 RX ORDER — CINACALCET 30 MG/1
30 TABLET, FILM COATED ORAL
Status: DISCONTINUED | OUTPATIENT
Start: 2023-09-29 | End: 2023-10-07

## 2023-09-29 RX ORDER — FENOFIBRATE 67 MG/1
67 CAPSULE ORAL
Status: DISCONTINUED | OUTPATIENT
Start: 2023-09-29 | End: 2023-10-07

## 2023-09-29 NOTE — PROGRESS NOTES
[facility] Pharmacy Note:  Renal Dose Adjustment for enoxaparin (LOVENOX)    Guillermo Messer has been prescribed enoxaparin 40 mg subcutaneously every 24 hours. Estimated Creatinine Clearance: 7.5 mL/min (A) (based on SCr of 4.44 mg/dL (H)). Calculated CrCl less than 20 mL/min so the dose of Enoxaparin (LOVENOX) has been changed to heparin 5000 units every 8 hours per P&T approved protocol. Pharmacy will continue to follow, and make additional adjustments if needed.       Thank you,  Danielle List, [facility]  9/29/2023 12:46 AM

## 2023-09-29 NOTE — CONSULTS
[facility]  Report of Inpatient Wound Care Consultation    Sarah Cee Patient Status:  Inpatient    1945 MRN [id number]   [facility] 3SW-A Attending Mariajose Recinos MD   Hosp Day # 1 PCP  [facility]     Reason for Consultation:  Diabetic foot ulcer    History of Present Illness:  Sarah Cee is a a(n) 66year old female. Patient with multiple comorbidities, with present on admission skin breakdown described below. SUBJECTIVE:  I have had this for some time. 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 She has never used smokeless tobacco. She reports that she does not currently use alcohol. She reports that she does not use drugs.       Allergies:  @ALLERGY    Laboratory Data:    Recent Labs   Lab 23  1914 23  0440   WBC 7.8 6.2   HGB 9.7* 8.8*   HCT 27.9* 25.9*   .0* 123.0*   CREATSERUM 4.44* 4.62*   BUN 58* 62*   * 114*   CA 8.6 8.1*   ALB 2.5*  --    TP 6.0*  --    ESRML 1  --    CRP 2.64*  --          ASSESSMENT:  Wound 23 #2 Right dorsal second toe Arterial Ulcer Toe (Comment which one) Dorsal;Right (Active)   Date First Assessed/Time First Assessed: 23 1459    Wound Number (Wound Clinic Only): #2 Right dorsal second toe  Primary Wound Type: Arterial Ulcer  Location: Toe (Comment which one)  Wound Location Orientation: Dorsal;Right      Assessments 2023  1:39 PM   Wound Image Wound 08/31/23 #3 Right plantar second toe Arterial Ulcer Toe (Comment which one) Right;Plantar (Active)   Date First Assessed/Time First Assessed: 08/31/23 1500    Wound Number (Wound Clinic Only): #3 Right plantar second toe  Primary Wound Type: Arterial Ulcer  Location: Toe (Comment which one)  Wound Location Orientation: Right;Plantar      Assessments 9/29/2023  1:39 PM   Wound Image                    R 2nd toe wound with abraison  R plantar 2nd toe measures at (0.7cm x 1.2cm x 0.4cm,) with pale granulation tissue, no odor, no drainage. R foot warm, +dorsal pedal pulse noted      Wound Cleaning and Dressings:  Showering directions: May shower and/or cleanse wound with mild soap and water  Wound cleansing:  Cleanse with normal saline or wound cleanser  Wound cleaning frequency: Every 48 hours  Wound product: Honey gel and Bordered foam  Dressing change frequency:  Change dressing every other day and/or as needed  Enzymatic agent:  Honey        Additional Notes:  Discharge planning in progress, Podiatry to see. Thank you for this consultation and for allowing me to participate in the care of your patient. Please page me at #[phone] if you have any questions about this consultation and plan of care. Time Spent 30 Minutes. Thank you,  Chery Ruiz.  Matthew Eisenberg, PT, MPT  Wound Care Clinician  [address] [place] [address] Team  9/29/2023

## 2023-09-29 NOTE — PROGRESS NOTES
NURSING ADMISSION NOTE      Patient admitted via Cart  Oriented to room. Safety precautions initiated. Bed in low position. Call light in reach. Pt admitted from ED. Family at bedside.

## 2023-09-29 NOTE — ED QUICK NOTES
Orders for admission, patient is aware of plan and ready to go upstairs. Any questions, please call ED RN Elena Carlisle at extension [phone].      Patient Covid vaccination status: Fully vaccinated     COVID Test Ordered in ED: None    COVID Suspicion at Admission: N/A    Running Infusions:  vanco 166.7ml/hr    Mental Status/LOC at time of transport: A&Ox4    Other pertinent information:   CIWA score: N/A   NIH score:  N/A

## 2023-09-29 NOTE — PLAN OF CARE
Pt A&Ox4 VSS on RA. HD Fistula to LUE, left arm precautions in place. HD today. Ambulating well with stand by assist and double cane. POC discussed, pt verbalized understanding. No further questions at this time. Call light within reach.

## 2023-09-29 NOTE — PLAN OF CARE
Pt A&Ox4 vital signs stable on RA. Severe c/o of pain, managed with PRN medication. IVF & abx infusing per MAR. HD Fistula to LUE. Ambulating well with stand by assist and double cane. Plan for Podiatry, wound care, and infectious disease to see in AM. POC discussed, pt verbalized understanding. No further questions at this time. Call light within reach. Podiatry, Nephrology, Infectious disease Consulted.

## 2023-09-29 NOTE — PROGRESS NOTES
[facility] Pharmacy Note:  Renal Adjustment for piperacillin/tazobactam (Eve Villalta)    Owen Mesa is a 66year old patient who has been prescribed piperacillin/tazobactam (ZOSYN) 3.375 g every 8 hrs. The estimated creatinine clearance is 7.5 mL/min (A) (based on SCr of 4.44 mg/dL (H)). The dose has been adjusted to piperacillin/tazobactam (ZOSYN) 3.375g every 12 hrs per hospital renal dose adjustment protocol for treatment of diabetic foot. Pharmacy will follow and adjust dose as warranted for additional renal function changes.     Thank you,    Cem Chapman, [facility]  9/29/2023  12:43 AM

## 2023-09-30 ENCOUNTER — APPOINTMENT (OUTPATIENT)
Dept: ULTRASOUND IMAGING | Facility: HOSPITAL | Age: 78
End: 2023-09-30
Attending: SURGERY
Payer: MEDICARE

## 2023-09-30 PROCEDURE — 99232 SBSQ HOSP IP/OBS MODERATE 35: CPT | Performed by: INTERNAL MEDICINE

## 2023-09-30 PROCEDURE — 99233 SBSQ HOSP IP/OBS HIGH 50: CPT | Performed by: HOSPITALIST

## 2023-09-30 PROCEDURE — 93970 EXTREMITY STUDY: CPT | Performed by: SURGERY

## 2023-09-30 PROCEDURE — 93880 EXTRACRANIAL BILAT STUDY: CPT | Performed by: SURGERY

## 2023-09-30 NOTE — PLAN OF CARE
Patient A&O X4 on RA. VSS, /IS. SCDs, subQ Heparin. Voiding freely, LBM 9/29. On IV Ancef Q24h. Wound to right second toe covered with bordered foam, c/d/i. Pain controlled with PO medication. Up min assist w/ cane. Reminded to use call light. Plan for vascular to see. 0647: informed patient that podiatrist ordered daily dressing changes to right foot. Patient asked if dressing change could be done after next Norco dose.

## 2023-09-30 NOTE — CONSULTS
[facility]    PATIENT'S NAME: Marion Sosa   ATTENDING PHYSICIAN: Timur Tamayo M.D.   Jeremy Winslow: Sathish Smith M.D. PATIENT ACCOUNT#:   [de-identified]    LOCATION:  [address] [place] [address]  MEDICAL RECORD #:   [id number]       YOB: 1945  ADMISSION DATE:       09/28/2023      CONSULT DATE:  09/30/2023    REPORT OF CONSULTATION    HISTORY OF PRESENT ILLNESS:  This is a 55-year-old white female known to me for a history of a right carotid endarterectomy that was done in 2022 who has end-stage renal disease, previous history of coronary artery disease, diabetes, hypertension with previous right first toe surgery who has ulcerations of her right second and third toes with osteomyelitis by MRI. Plan is for possible future surgery of those toes. The patient several months ago while at [address] [place] [address],[address] developed gram-negative sepsis, C difficile, and COVID. She developed significant swelling in her legs and then ulcerations. Both sides have been affected, but the left one healed, the right one did not. She came to the hospital with these ulcerations. The patient had a recent angioplasty last Tuesday by Dr. Teodoro Mccrary at [facility] where supposedly he opened up parts of the artery, maybe in the thigh, but according to the family did not get the tibial vessels. She denies chest pain or shortness of breath at this time, but has a history of coronary bypass surgery with vein taken from the right leg and a history of PTCA of the coronary arteries. She has been followed by Dr. Aleta Mckeon at [facility].  She is on dialysis Monday, Wednesday, and Friday, being followed by Dr. Henry Moy at [address] [place] [address],[address], currently being followed by Dr. Maria Victoria Martinez at this hospital.      PAST MEDICAL HISTORY:  She carries a diagnosis of leukemia, followed by Pebbles Teixeira who states that this is in remission at this time. She is from [facility].     PAST SURGICAL HISTORY:  Had undergone a right carotid endarterectomy with a right great saphenous vein patch by myself here at [facility] on March 17, 2022. She had at least an 80% stenosis of the carotid with an ulcerated lesion. She also had some disease in the vertebral arteries and there was some contralateral disease. Her last ultrasound showed the right side was open back in 2022, and she had stenosis on the left side but it appeared to be less than 70%. The patient is having Wound Care follow her for her foot wounds. MEDICATIONS:  She has been on Plavix, aspirin 81 mg a day, allopurinol 300 mg a day, atorvastatin 10 mg a day, carvedilol 25 mg b.i.d. She is also on IV antibiotics with Ancef, being followed by Dr. Randal Barba. ALLERGIES:  She has allergies to epinephrine. That is the only thing that is noted in the chart. SOCIAL HISTORY:  There is no ETOH abuse, drug abuse, tobacco abuse. Patient was with her daughter, Jessica Ochoa, and I talked on the phone with another daughter, Zenia Damon. She has 4 children, 3 daughters. She has a history of smoking but quit over 40 years ago. She is single, independent living. REVIEW OF SYSTEMS:  The patient currently has no abdominal pain, no back pain. No nausea or vomiting. No hematemesis. No bright red blood per rectum. No hematuria, dysuria. No hemoptysis, cough, sputum production. No weight loss. No fever, chills. She denies any recent CVA, TIA, visual field defect, or aphasia. She had a questionable TIA in the past.      PHYSICAL EXAMINATION:    GENERAL:  She is awake. She is alert. She is sitting upright in a chair, no acute distress. VITAL SIGNS:  She is afebrile. Pulse is 80, blood pressure 499 to 841 systolic. HEENT:  Pupils equal and round. Sclerae are clear. Mouth without lesions. NECK:  Cervical bruits bilaterally. LUNGS:  Breath sounds bilaterally. HEART:  Systolic ejection murmur at left sternal border. ABDOMEN:  Soft, obese.   No tenderness or masses. EXTREMITIES:  Femoral pulses appear to be palpable. Popliteal and pedal pulses are diminished bilaterally. Ulceration of the right second toe. No erythema at this time. No purulent drainage. Left foot appears to be intact. No gross swelling in the lower legs. Upper extremity pulses were present, but she has a fistula that was placed at [facility] 5 years ago that is functioning in the left upper arm. Imaging was reviewed. She had an ultrasound done yesterday which shows dampening of waveform on the right side from the mid-distal superficial femoral artery going down to the ankle. Vein mapping has been ordered and a CT angiogram has been ordered also. There is a stenosis at the origin of profunda femoral artery per the report, high-grade. In the popliteal artery proximally the waveforms have become marked spectral broadening and the mid-popliteal artery significant broadening, so there is disease in the distal superficial femoral and popliteal artery and the tibial vessels. The proximal posterior tibial and mid-posterior tibial are open, but the waveform dampens down on the distal posterior tibial artery to about 35 cm/sec flow. Flow is seen in the dorsal pedal artery at the foot, about 50 to 60 cm/sec, but the waveforms are monophasic, and the anterior tibial artery proximal to this is also monophasic. IMPRESSION:  Patient with end-stage renal disease with recent angioplasty of possibly the superficial femoral artery and popliteal artery who has a history of coronary artery disease, hypertension, dyslipidemia, possible borderline diabetes with a past history of leukemia on dialysis 3 times a week with ischemic ulceration and osteomyelitis of the right second toe with what appears to be right distal superficial femoral artery and popliteal artery tibial vessel disease.     Discussed with the patient and her 2 daughters the risks and complications of losing the leg with above- or below-knee amputation. The blood flow down the leg may be improved after the angioplasty by Dr. Becka Mcgowan, but I cannot say for sure it is enough to heal.  I would recommend a CT angiogram with a possible formal angiogram.  If she needs revascularization, would evaluate for angioplasty, but might need a bypass surgery if vein is available. She was discussed the risks and complications of bypass surgery with death, myocardial infarction, bleeding, infection, graft thrombosis, limb loss, future graft thrombosis, future limb loss, multisystem organ failure, sepsis; risks and complications of not doing the treatment and the risks and complications of recurrent stenosis with angioplasty, distal embolization, arterial injury, limb loss, cardiac complications, and death. The patient and her family understand. All questions have been answered. We will see if there is enough flow for primary amputation at this time, but, if not, then we will have to revascularize. Consulting Cardiology. I will talk with Dr. Declan Jose, but have a cardiologist watch her while she is here in the hospital.  Notes reviewed in chart, 45 minutes to 1 hour with the patient. All questions answered.     Dictated By Charlie Barnes M.D.  d: 09/30/2023 10:55:02  t: 09/30/2023 11:56:53  Job [id number]  JJW/    cc: BELEN Bird M.D.

## 2023-09-30 NOTE — PLAN OF CARE
Patient alert and oriented x4. VSS on RA. Pain controlled with PO PRN pain meds. Denies n/t. Dressing to right foot changed, betadine, 4x4s, abd pad, and kerlix roll. Refusing SCDs, ankle pumps encouraged, subcutaneous heparin. Pt up x1 with the walker. WBAT to RLE with post op shoe. Left limb alert, fistula in place, thrill and bruit present. Tolerating diet, no c/o n/v. Diminished urine production. LBM 9/30. EKG complete and on chart     Plan: ECHO to be done, CTA of abd and pelvis to be done.

## 2023-09-30 NOTE — CONSULTS
[facility]    Report of Consultation    Cira Crabtree Patient Status:  Inpatient    1945 MRN [id number]   [facility] 3SW-A Attending Dimitri Arshad MD   Hosp Day # 1 [facility]  [place]     Date of Admission:  2023  Date of Consult: 2023    Reason for Consultation:  Consulted by Dr. Rehana York due to worsening wounds to right foot with second digit bone exposure    History of Present Illness:  Cira Crabtree is a a(n) 66year old female with history of PAD, ESRD on dialysis, hypertension, and leukemia who is seen this evening while having dialysis for evaluation of right foot. Patient was on the phone with her daughter at the time of visit, and she was listened to the entire discussion. Patient did obtain an angioplasty of her right lower extremity this past Wednesday, 2023 by Dr. Spencer Kwong at [facility].  Patient was sent for direct admission from the wound care center due to deteriorating wound with exposed bone to the second digit. Patient is denying any recent nausea, vomiting, fever, chills. History:  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 ([address]     Coronary atherosclerosis     Dialysis patient ([address]     High cholesterol     History of blood transfusion     Leukemia (HCC)     Renal disorder     Right foot ulcer, with fat layer exposed ([address]      Past Surgical History:   Procedure Laterality Date    APPENDECTOMY      AV FISTULA OR GRAFT ARTERIAL Left     CABG      CATH PERCUTANEOUS  TRANSLUMINAL CORONARY ANGIOPLASTY      CHOLECYSTECTOMY      OTHER Right     big toe amputation     Family History   Problem Relation Age of Onset    Heart Disorder Father     Cancer Mother       reports that she has quit smoking. She has never used smokeless tobacco. She reports that she does not currently use alcohol. She reports that she does not use drugs.     Allergies:    Epinephrine             OTHER (SEE COMMENTS)    Comment:Dental epi. Pain for 2 weeks after dental             procedure. Medications:    Current Facility-Administered Medications:     heparin (Porcine) 5000 UNIT/ML injection 5,000 Units, 5,000 Units, Subcutaneous, Q8H CHEO    HYDROcodone-acetaminophen (Norco) 5-325 MG per tab 1 tablet, 1 tablet, Oral, Q6H PRN    allopurinol (Zyloprim) tab 300 mg, 300 mg, Oral, Daily    aspirin DR tab 81 mg, 81 mg, Oral, Daily    atorvastatin (Lipitor) tab 10 mg, 10 mg, Oral, Nightly    calcium acetate (Phoslo) cap 667 mg, 1 capsule, Oral, TID CC    carvedilol (Coreg) tab 25 mg, 25 mg, Oral, BID with meals    cinacalcet (Sensipar) tab 30 mg, 30 mg, Oral, Daily with breakfast    clopidogrel (Plavix) tab 75 mg, 75 mg, Oral, Daily    fenofibrate micronized (Lofibra) cap 67 mg, 67 mg, Oral, Daily with breakfast    ceFAZolin (Ancef) 2 g in 20mL IV syringe premix, 2 g, Intravenous, Q24H    acetaminophen (Tylenol Extra Strength) tab 500 mg, 500 mg, Oral, Q4H PRN    melatonin tab 3 mg, 3 mg, Oral, Nightly PRN    polyethylene glycol (PEG 3350) (Miralax) 17 g oral packet 17 g, 17 g, Oral, Daily PRN    sennosides (Senokot) tab 17.2 mg, 17.2 mg, Oral, Nightly PRN    bisacodyl (Dulcolax) 10 MG rectal suppository 10 mg, 10 mg, Rectal, Daily PRN    ondansetron (Zofran) 4 MG/2ML injection 4 mg, 4 mg, Intravenous, Q6H PRN    metoclopramide (Reglan) 5 mg/mL injection 5 mg, 5 mg, Intravenous, Q8H PRN    glycerin-hypromellose- (Artifical Tears) 0.2-0.2-1 % ophthalmic solution 1 drop, 1 drop, Both Eyes, QID PRN    sodium chloride (Saline Mist) 0.65 % nasal solution 1 spray, 1 spray, Each Nare, Q3H PRN    Review of Systems:  10 point ROS completed and was negative, except for pertinent positive and negatives stated in subjective. Physical Exam:  GENERAL: well developed, well nourished, in no apparent distress  EXTREMITIES:   1.  Integument: Full-thickness ulceration present to the dorsal aspect of the right second digit with head of the proximal phalanx exposed. Slight duskiness noted to the right second digit. Subsecond metatarsal head ulceration present to the right foot. No surrounding erythema, purulent drainage, or other signs of infection. Does probe deep, but no hard stop is noted. Normal skin temperature and turgor  2. Vascular: Nonpalpable dorsalis pedis or posterior tibial pulses noted to the right foot. No hand-held Doppler available. CFT less than 5 seconds to all digits except second of the right foot, which was sluggish. 1+ pitting edema noted to right foot   3. Musculoskeletal: All muscle groups are graded 5/5 in the foot and ankle. With palpation to right second digit. 4. Neurological: Normal sharp dull sensation; gross sensation intact via light touch bilaterally. Imaging:  MRI FOOT, RIGHT (RPZ=23608)    Result Date: 9/29/2023  CONCLUSION:   High T2 signal extending from the distal aspect of the proximal phalanx of the 2nd toe, middle and distal phalanx is noted. There is low T1 signal at the proximal to phalangeal joint. This region of low T1 signal proximal to phalangeal joint with the high T2 signal may represent developing osteomyelitis at this location. This is new when compared to prior examination from 12/19/2019. LOCATION:  [facility]   Dictated by (CST): Kenyatta Pascual MD on 9/29/2023 at 9:18 PM     Finalized by (CST): Kenyatta Pascual MD on 9/29/2023 at 9:25 PM       [address] [place] [address] (RTQ=[id number])    Result Date: 9/29/2023  CONCLUSION:  There is a significant stenosis at the right profunda femoral artery with a velocity ratio of 159 to 297 cm/second. LOCATION:  [facility]    Dictated by (CST): Didi Roberts MD on 9/29/2023 at 1:18 PM     Finalized by (CST): Didi Roberts MD on 9/29/2023 at 1:21 PM       XR FOOT, COMPLETE (MIN 3 VIEWS), RIGHT (CPT=73630)    Result Date: 9/29/2023  CONCLUSION:  1. No acute fractures.  2. No cortical irregularities to suggest osteomyelitis but if clinical concern is high, consider MRI. 3. Osteoarthritic changes. 4. Stable postoperative changes within the 1st digit. LOCATION:  [facility]   Dictated by (CST): Woody Fung MD on 9/29/2023 at 9:51 AM     Finalized by (CST): Woody Fung MD on 9/29/2023 at 9:52 AM         Laboratory Data:  Recent Labs   Lab 09/29/23  0440   RBC 2.39*   HGB 8.8*   HCT 25.9*   .4*   MCH 36.8*   MCHC 34.0   RDW 17.9   NEPRELIM 3.58   WBC 6.2   .0*       Impression and Plan:  Patient Active Problem List:     Gangrene of toe of right foot (HCC)     Ischemic pain of foot, right     PAD (peripheral artery disease) ([facility])     Great toe amputation status, right     Gangrene of right foot ([address]     ESRD (end stage renal disease) ([address]     Primary hypertension     Anemia due to chronic kidney disease, on chronic dialysis ([address]     Carotid stenosis, right     Osteomyelitis, unspecified site, unspecified type ([address]     Ulcer of right foot, unspecified ulcer stage ([address]     CLL (chronic lymphocytic leukemia) ([address]        Plan:  -Patient was seen resting in her hospital bed receiving dialysis this evening. She was on the phone with her daughter who did ask questions throughout the visit.    -Discussed physical exam findings with the patient and her daughter. Advised patient that there is high suspicion of osteomyelitis to the right second digit due to exposed phalanx. Plantar right foot wound does probe deep with no current signs of infection. -MRI of the right foot was ordered today by ID. We will follow-up with results for further treatment options. Did discuss with patient and her daughter that at a minimum, would recommend partial amputation versus total amputation of right second digit.   I did discuss that pending MRI results, we may have to discuss more proximal amputation options to right lower extremity    -Patient did recently undergo angioplasty at [facility].  Currently nonpalpable pulses with pitting edema present. Because of this, recommend consult for vascular surgery. This was placed today. Appreciate any recommendations.    -We will have nursing staff keep wounds covered with Betadine, 4 x 4's, Kerlix, changing daily.    -Patient currently on IV cefazolin per infectious disease.    -I will continue to monitor patient while she is in-house. If need to add on patient for surgery, will most likely do that next week. Thank you for the opportunity to participate in patient's care.     Parul Fulton DPM   9/29/2023  11:53 PM

## 2023-09-30 NOTE — PROGRESS NOTES
[facility]    PODIATRY PROGRESS NOTE    Sarah Cee Patient Status:  Inpatient    1945 MRN [id number]   [facility] 3SW-A Attending Mariajose Recinos MD   Hosp Day # 2 PCP Damion Malagon     Date of Admission:  2023    History of Present Illness:  Patient seen this morning resting comfortably in her hospital chair. Denies any overnight events. She does relate pain to her right second digit. She did have her MRI last night and is hoping to discuss findings and further options. Denies new complaints. History:  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 ([address]     Coronary atherosclerosis     D[facility]     High cholesterol     History of blood transfusion     Leukemia (HCC)     Renal disorder     Right foot ulcer, with fat layer exposed ([address]      Past Surgical History:   Procedure Laterality Date    APPENDECTOMY      AV FISTULA OR GRAFT ARTERIAL Left     CABG      CATH PERCUTANEOUS  TRANSLUMINAL CORONARY ANGIOPLASTY      CHOLECYSTECTOMY      OTHER Right     big toe amputation     Family History   Problem Relation Age of Onset    Heart Disorder Father     Cancer Mother       reports that she has quit smoking. She has never used smokeless tobacco. She reports that she does not currently use alcohol. She reports that she does not use drugs. Allergies:    Epinephrine             OTHER (SEE COMMENTS)    Comment:Dental epi. Pain for 2 weeks after dental             procedure.     Medications:    Current Facility-Administered Medications:     heparin (Porcine) 5000 UNIT/ML injection 5,000 Units, 5,000 Units, Subcutaneous, Q8H [address]    HYDROcodone-acetaminophen (Norco) 5-325 MG per tab 1 tablet, 1 tablet, Oral, Q6H PRN    allopurinol (Zyloprim) tab 300 mg, 300 mg, Oral, Daily    aspirin DR tab 81 mg, 81 mg, Oral, Daily    atorvastatin (Lipitor) tab 10 mg, 10 mg, Oral, Nightly    calcium acetate (Phoslo) cap 667 mg, 1 capsule, Oral, TID CC    carvedilol (Coreg) tab 25 mg, 25 mg, Oral, BID with meals    cinacalcet (Sensipar) tab 30 mg, 30 mg, Oral, Daily with breakfast    clopidogrel (Plavix) tab 75 mg, 75 mg, Oral, Daily    fenofibrate micronized (Lofibra) cap 67 mg, 67 mg, Oral, Daily with breakfast    ceFAZolin (Ancef) 2 g in 20mL IV syringe premix, 2 g, Intravenous, Q24H    acetaminophen (Tylenol Extra Strength) tab 500 mg, 500 mg, Oral, Q4H PRN    melatonin tab 3 mg, 3 mg, Oral, Nightly PRN    polyethylene glycol (PEG 3350) (Miralax) 17 g oral packet 17 g, 17 g, Oral, Daily PRN    sennosides (Senokot) tab 17.2 mg, 17.2 mg, Oral, Nightly PRN    bisacodyl (Dulcolax) 10 MG rectal suppository 10 mg, 10 mg, Rectal, Daily PRN    ondansetron (Zofran) 4 MG/2ML injection 4 mg, 4 mg, Intravenous, Q6H PRN    metoclopramide (Reglan) 5 mg/mL injection 5 mg, 5 mg, Intravenous, Q8H PRN    glycerin-hypromellose- (Artifical Tears) 0.2-0.2-1 % ophthalmic solution 1 drop, 1 drop, Both Eyes, QID PRN    sodium chloride (Saline Mist) 0.65 % nasal solution 1 spray, 1 spray, Each Nare, Q3H PRN    Review of Systems:  10 point ROS completed and was negative, except for pertinent positive and negatives stated in subjective. Physical Exam:  1. Integument: Dressings left intact today. There is a known full-thickness ulceration present to the dorsal aspect of the right second digit with head of the proximal phalanx exposed. Subsecond metatarsal head ulceration present to the right foot. 2. Vascular: Nonpalpable dorsalis pedis or posterior tibial pulses noted to the right foot. No hand-held Doppler available. CFT less than 5 seconds to all digits except second of the right foot, which was sluggish. 1+ pitting edema noted to right foot              3. Musculoskeletal: Pain with palpation to right second digit. 4. Neurological: Gross sensation intact via light touch bilaterally.       Imaging:  MRI FOOT, RIGHT (DLA=[id number])    Result Date: 9/29/2023  CONCLUSION:   High T2 signal extending from the distal aspect of the proximal phalanx of the 2nd toe, middle and distal phalanx is noted. There is low T1 signal at the proximal to phalangeal joint. This region of low T1 signal proximal to phalangeal joint with the high T2 signal may represent developing osteomyelitis at this location. This is new when compared to prior examination from 12/19/2019. LOCATION:  [facility]   Dictated by (CST): Katya Montgomery MD on 9/29/2023 at 9:18 PM     Finalized by (CST): Katya Montgomery MD on 9/29/2023 at 9:25 PM       2 [facility] (BKO=[id number])    Result Date: 9/29/2023  CONCLUSION:  There is a significant stenosis at the right profunda femoral artery with a velocity ratio of 159 to 297 cm/second. LOCATION:  [facility]    Dictated by (CST): Elizabeth Marcial MD on 9/29/2023 at 1:18 PM     Finalized by (CST): Elizabeth Marcial MD on 9/29/2023 at 1:21 PM       XR FOOT, COMPLETE (MIN 3 VIEWS), RIGHT (CPT=73630)    Result Date: 9/29/2023  CONCLUSION:  1. No acute fractures. 2. No cortical irregularities to suggest osteomyelitis but if clinical concern is high, consider MRI. 3. Osteoarthritic changes. 4. Stable postoperative changes within the 1st digit.    LOCATION:  [facility]   Dictated by (CST): Joe Swanson MD on 9/29/2023 at 9:51 AM     Finalized by (CST): Joe Swanson MD on 9/29/2023 at 9:52 AM         Laboratory Data:  Recent Labs   Lab 09/29/23  0440   RBC 2.39*   HGB 8.8*   HCT 25.9*   .4*   MCH 36.8*   MCHC 34.0   RDW 17.9   NEPRELIM 3.58   WBC 6.2   .0*       Impression:  Patient Active Problem List:     Gangrene of toe of right foot (HCC)     Ischemic pain of foot, right     PAD (peripheral artery disease) ([facility])     Great toe amputation status, right     Gangrene of right foot ([facility]     ESRD (end stage renal disease) ([facility]     Primary hypertension     Anemia due to chronic kidney disease, on chronic dialysis ([facility]     Carotid stenosis, right     Osteomyelitis, unspecified site, unspecified type (HCC)     Ulcer of right foot, unspecified ulcer stage ([facility]     CLL (chronic lymphocytic leukemia) ([facility]        Plan:  -Patient seen this morning resting comfortably. No overnight events. Continued pain to right second digit.    -Independently reviewed and discussed MRI findings with patient. There is increased signal intensity noted to the right second digit phalanges, concerning for osteomyelitis. Also explained to patient that due to entire head of proximal phalanx being exposed to air, it is almost certain that that there is bone infection within the second digit. MRI did not show osteomyelitic changes to the second metatarsal, deep to patient's right plantar wound.    -Because of the MRI findings, discussed with patient and her family that I would recommend a second digit amputation of the right foot. Also explained that I would debride patient's plantar wound in the OR and apply Kerecis wound graft.    -Prior to surgery, I discussed with patient that I would like her evaluated by vascular surgery to ensure that she has adequate blood flow to heal an amputation. Patient did have an angi[facility] at [facility] on Tuesday.    -Pending vascular surgery evaluation, will plan to add patient on for right second digit amputation early next week. -We will have nursing staff change dressings daily with Betadine and dry sterile dressing.      -Okay for patient to be weightbearing as tolerated in surgical shoe to right lower extremity.       Ric Lindsey DPM   9/30/2023  10:58 AM

## 2023-10-01 ENCOUNTER — APPOINTMENT (OUTPATIENT)
Dept: CV DIAGNOSTICS | Facility: HOSPITAL | Age: 78
End: 2023-10-01
Attending: NURSE PRACTITIONER
Payer: MEDICARE

## 2023-10-01 ENCOUNTER — APPOINTMENT (OUTPATIENT)
Dept: CT IMAGING | Facility: HOSPITAL | Age: 78
End: 2023-10-01
Attending: SURGERY
Payer: MEDICARE

## 2023-10-01 LAB
ATRIAL RATE: 80 BPM
P AXIS: 7 DEGREES
P-R INTERVAL: 212 MS
Q-T INTERVAL: 418 MS
QRS DURATION: 140 MS
QTC CALCULATION (BEZET): 482 MS
R AXIS: 135 DEGREES
T AXIS: -27 DEGREES
VENTRICULAR RATE: 80 BPM

## 2023-10-01 PROCEDURE — 75635 CT ANGIO ABDOMINAL ARTERIES: CPT | Performed by: SURGERY

## 2023-10-01 PROCEDURE — 99232 SBSQ HOSP IP/OBS MODERATE 35: CPT | Performed by: INTERNAL MEDICINE

## 2023-10-01 PROCEDURE — 99232 SBSQ HOSP IP/OBS MODERATE 35: CPT | Performed by: HOSPITALIST

## 2023-10-01 PROCEDURE — 93306 TTE W/DOPPLER COMPLETE: CPT | Performed by: NURSE PRACTITIONER

## 2023-10-01 RX ORDER — CARVEDILOL 6.25 MG/1
6.25 TABLET ORAL
Status: DISCONTINUED | OUTPATIENT
Start: 2023-10-01 | End: 2023-10-07

## 2023-10-01 NOTE — PROGRESS NOTES
Patient alert and oriented x4. VSS on RA. Pain controlled with PO PRN pain meds. Refusing SCDs, ankle pumps encouraged, subcutaneous heparin. Dressing to right foot wound dressing changed and CDI. Pt up x1 with walker and post op shoe. Tolerating diet, no c/o n/v. Diminished urine production.      Plan: dialysis tomorrow

## 2023-10-01 NOTE — PROGRESS NOTES
TTE completed w/ normal TAVR func and preserved LV sys func but with signif MR    Await decisions on planned procedure as outlined in 9/30 not    Card will cont to follow        No bill

## 2023-10-01 NOTE — PLAN OF CARE
Patient A&O X4 on RA. VSS, /IS. Refusing SCDs, subQ Heparin. Voiding freely, LBM 9/30. On IV Ancef Q24h. LUE AV fistula. Wound to right second toe covered with gauze/kerlix, c/d/i. Pain controlled with PO medication. Up min assist w/ walker. WBAT with post-op shoe. Reminded to use call light. Plan for ECHO and CTA to be done tomorrow.

## 2023-10-02 LAB
ANION GAP SERPL CALC-SCNC: 9 MMOL/L (ref 0–18)
BASOPHILS # BLD AUTO: 0.04 X10(3) UL (ref 0–0.2)
BASOPHILS NFR BLD AUTO: 0.6 %
BUN BLD-MCNC: 17 MG/DL (ref 7–18)
CALCIUM BLD-MCNC: 8.4 MG/DL (ref 8.5–10.1)
CHLORIDE SERPL-SCNC: 102 MMOL/L (ref 98–112)
CO2 SERPL-SCNC: 25 MMOL/L (ref 21–32)
CREAT BLD-MCNC: 3.51 MG/DL
EGFRCR SERPLBLD CKD-EPI 2021: 13 ML/MIN/1.73M2 (ref 60–?)
EOSINOPHIL # BLD AUTO: 0.35 X10(3) UL (ref 0–0.7)
EOSINOPHIL NFR BLD AUTO: 4.9 %
ERYTHROCYTE [DISTWIDTH] IN BLOOD BY AUTOMATED COUNT: 19.1 %
GLUCOSE BLD-MCNC: 133 MG/DL (ref 70–99)
HCT VFR BLD AUTO: 28.1 %
HGB BLD-MCNC: 9.5 G/DL
IMM GRANULOCYTES # BLD AUTO: 0.04 X10(3) UL (ref 0–1)
IMM GRANULOCYTES NFR BLD: 0.6 %
LYMPHOCYTES # BLD AUTO: 1.3 X10(3) UL (ref 1–4)
LYMPHOCYTES NFR BLD AUTO: 18.1 %
MCH RBC QN AUTO: 37.8 PG (ref 26–34)
MCHC RBC AUTO-ENTMCNC: 33.8 G/DL (ref 31–37)
MCV RBC AUTO: 112 FL
MONOCYTES # BLD AUTO: 0.93 X10(3) UL (ref 0.1–1)
MONOCYTES NFR BLD AUTO: 12.9 %
NEUTROPHILS # BLD AUTO: 4.53 X10 (3) UL (ref 1.5–7.7)
NEUTROPHILS # BLD AUTO: 4.53 X10(3) UL (ref 1.5–7.7)
NEUTROPHILS NFR BLD AUTO: 62.9 %
OSMOLALITY SERPL CALC.SUM OF ELEC: 285 MOSM/KG (ref 275–295)
PLATELET # BLD AUTO: 135 10(3)UL (ref 150–450)
POTASSIUM SERPL-SCNC: 3.6 MMOL/L (ref 3.5–5.1)
RBC # BLD AUTO: 2.51 X10(6)UL
SODIUM SERPL-SCNC: 136 MMOL/L (ref 136–145)
WBC # BLD AUTO: 7.2 X10(3) UL (ref 4–11)

## 2023-10-02 PROCEDURE — 99233 SBSQ HOSP IP/OBS HIGH 50: CPT | Performed by: HOSPITALIST

## 2023-10-02 PROCEDURE — 99232 SBSQ HOSP IP/OBS MODERATE 35: CPT | Performed by: INTERNAL MEDICINE

## 2023-10-02 RX ORDER — ALBUMIN, HUMAN INJ 5% 5 %
12.5 SOLUTION INTRAVENOUS ONCE
Status: COMPLETED | OUTPATIENT
Start: 2023-10-02 | End: 2023-10-02

## 2023-10-02 RX ORDER — SODIUM CHLORIDE 9 MG/ML
INJECTION, SOLUTION INTRAVENOUS CONTINUOUS
Status: DISCONTINUED | OUTPATIENT
Start: 2023-10-02 | End: 2023-10-07

## 2023-10-02 NOTE — PROGRESS NOTES
Dr. Ambreen Polk here, plan for amputation Thbriseyda. Kina Edwards from his standpoint for pt to continue on ASA and plavix.

## 2023-10-02 NOTE — CM/SW NOTE
10/02/23 1100   CM/SW Referral Data   Referral Source Social Work (self-referral)   Reason for Referral Discharge planning   Informant Patient;EMR;Clinical Staff Member   Patient Info   Patient's Current Mental Status at Time of Assessment Alert;Oriented   Patient's [address]   Number of Levels in Home 1   Patient lives with Alone   Patient Status Prior to Admission   Independent with ADLs and Mobility No   Pt. requires assistance with Housework;Driving   Services in place prior to admission DME/Supplies at home;Dialysis   Type of DME/Supplies Wheeled Walker;[facility] [address] [facility]   Scheduled Dialysis days M-W-F   Discharge Needs   Anticipated D/C needs To be determined         Patient is a 65 y/o woman admitted with foot ulcer and osteo. Plan for angio tomorrow 10/3. Met with pt who stated that she lives alone in a ranch style home. Her daughter lives 2 blocks away and is very supportive, assisting pt as needed. Pt goes to outpt HD at [address] MWF. She has transportation services to/from HD appointments. Pt has previous history of [facility] for [place] [address], but did not care for the facility. She did have [place] [address] services after rehab, but is unsure of the name of the agency. Discussed DC planning and possible needs. Pt anticipates being able to return to home at DC. Await further MD recommendations/progress for any other DC needs. / to remain available for support and/or discharge planning.      Tamir Govea LCSW  Discharge Planner  [phone]

## 2023-10-02 NOTE — PROGRESS NOTES
Spoke with Dr. Zita Walker, ok to give aspirin and plavix. HOLD heparin after tonights dose. Spoke with Dr. Ambrosio Bates, pt requesting 2L being taken off during HD today. He is ok with taking 2 L off. Dialysis nurse notified.

## 2023-10-02 NOTE — PROGRESS NOTES
BP 89/36, pt sitting up in chair after HD, 2L removed. Pt asymptomatic. Notified Dr. Caro Burk, 250ml albumin ordered, CBC and BMP now.

## 2023-10-02 NOTE — PLAN OF CARE
Pt A & O x4, on RA. /IS. LAV fistula +/+. Regular diet. Heparin subcutaneous. ASA/plavix. Last Bm 10/2. WBAT to Right heel with surgical shoe. Up x 1/walker. Dressing change daily to R foot. Plan for Angio tomorrow. Dr. Tsering Powers to come see pt later today to discuss plan.

## 2023-10-02 NOTE — CONSULTS
[facility]    PATIENT'S NAME: Marion Sosa   ATTENDING PHYSICIAN: DILLAN Cheatham: Stahish Smith M.D. PATIENT ACCOUNT#:   [de-identified]    LOCATION:  [address] [place] [address]  MEDICAL RECORD #:   [id number]       YOB: 1945  ADMISSION DATE:       09/28/2023      CONSULT DATE:  10/02/2023    REPORT OF CONSULTATION    FOLLOWUP CONSULTATION    A 42-year-old white female with gangrene, ulcerations of the right foot, had a stent placed by Dr. Teodoro Mccrary last week at [facility], has diffuse disease of the distal common femoral artery, severe disease of the origin of profunda femoral artery, and significant popliteal tibial vessel disease. The patient's flow at the ankle may be borderline for healing. Ultrasound suggested that there was no saphenous vein available. Though a vein was seen in the left leg, it did not have any color flow so may be thrombosed. No vein seen in the right leg as was previously harvested for carotid surgery as well as for bypass surgery. Will schedule for an angiogram for tomorrow. The risks and complications of the procedure were explained to the patient including death, myocardial infarction, arterial injury, distal embolization, limb loss, future limb loss, stenosis, recurrent stenosis of the stent by angioplasty. Risks and complication of bypass surgery were also explained. The patient and family members understand. All questions answered.     Dictated By Sathish Smith M.D.  d: 10/02/2023 08:09:42  t: 10/02/2023 08:52:08  [facility] [id number]  YGY/    cc: Sathish Smith M.D.

## 2023-10-02 NOTE — PLAN OF CARE
A/O VSS on room air. Pain managed with norco prn. Right foot dressing clean dry and intact. Change daily. Weight bearing as tolerated in surgical shoe. HD ordered for today. Plan of care reviewed. Call light within reach. Bed alarm on.

## 2023-10-03 ENCOUNTER — APPOINTMENT (OUTPATIENT)
Dept: INTERVENTIONAL RADIOLOGY/VASCULAR | Facility: HOSPITAL | Age: 78
End: 2023-10-03
Attending: SURGERY
Payer: MEDICARE

## 2023-10-03 PROCEDURE — 99232 SBSQ HOSP IP/OBS MODERATE 35: CPT | Performed by: INTERNAL MEDICINE

## 2023-10-03 PROCEDURE — 99233 SBSQ HOSP IP/OBS HIGH 50: CPT | Performed by: HOSPITALIST

## 2023-10-03 PROCEDURE — B41FYZZ FLUOROSCOPY OF RIGHT LOWER EXTREMITY ARTERIES USING OTHER CONTRAST: ICD-10-PCS | Performed by: SURGERY

## 2023-10-03 RX ORDER — HEPARIN SODIUM 5000 [USP'U]/ML
INJECTION, SOLUTION INTRAVENOUS; SUBCUTANEOUS
Status: COMPLETED
Start: 2023-10-03 | End: 2023-10-03

## 2023-10-03 RX ORDER — CEFAZOLIN SODIUM/WATER 2 G/20 ML
SYRINGE (ML) INTRAVENOUS
Status: COMPLETED
Start: 2023-10-03 | End: 2023-10-03

## 2023-10-03 RX ORDER — LIDOCAINE 50 MG/G
OINTMENT TOPICAL AS NEEDED
Status: DISCONTINUED | OUTPATIENT
Start: 2023-10-03 | End: 2023-10-07

## 2023-10-03 RX ORDER — ACETAMINOPHEN 325 MG/1
650 TABLET ORAL EVERY 4 HOURS PRN
Status: DISCONTINUED | OUTPATIENT
Start: 2023-10-03 | End: 2023-10-07

## 2023-10-03 RX ORDER — HYDROCODONE BITARTRATE AND ACETAMINOPHEN 5; 325 MG/1; MG/1
2 TABLET ORAL EVERY 4 HOURS PRN
Status: DISCONTINUED | OUTPATIENT
Start: 2023-10-03 | End: 2023-10-07

## 2023-10-03 RX ORDER — LIDOCAINE HYDROCHLORIDE 10 MG/ML
INJECTION, SOLUTION EPIDURAL; INFILTRATION; INTRACAUDAL; PERINEURAL
Status: COMPLETED
Start: 2023-10-03 | End: 2023-10-03

## 2023-10-03 RX ORDER — MIDAZOLAM HYDROCHLORIDE 1 MG/ML
INJECTION INTRAMUSCULAR; INTRAVENOUS
Status: COMPLETED
Start: 2023-10-03 | End: 2023-10-03

## 2023-10-03 RX ORDER — SODIUM CHLORIDE 9 MG/ML
INJECTION, SOLUTION INTRAVENOUS CONTINUOUS
Status: DISCONTINUED | OUTPATIENT
Start: 2023-10-03 | End: 2023-10-07

## 2023-10-03 RX ORDER — HYDROCODONE BITARTRATE AND ACETAMINOPHEN 5; 325 MG/1; MG/1
1 TABLET ORAL EVERY 4 HOURS PRN
Status: DISCONTINUED | OUTPATIENT
Start: 2023-10-03 | End: 2023-10-07

## 2023-10-03 RX ORDER — IODIXANOL 320 MG/ML
150 INJECTION, SOLUTION INTRAVASCULAR
Status: COMPLETED | OUTPATIENT
Start: 2023-10-03 | End: 2023-10-03

## 2023-10-03 NOTE — PLAN OF CARE
A/O VSS on room air. Pain managed with norco prn. Right foot dressing clean dry and intact. Change daily. Weight bearing as tolerated in surgical shoe. Angiogram today, consent signed and on chart. [facility] called for HD tomorrow. Plan of care reviewed. Call light within reach.

## 2023-10-03 NOTE — PROGRESS NOTES
NURSING ADMISSION NOTE      Patient admitted via Cart  Oriented to room. Safety precautions initiated. Bed in low position. Call light in reach.     Received patient approximately 1500  Patient alert and oriented X4  On room air, BP low, within parameters, NSR on tele  Denies pain  Bedrest until 1900  Post cath assessments in place, see flowsheets   Left groin site with derma bond intact, soft, no hematoma  Pulses per doppler  Right foot with gauze dressing C/D/I  Anuric, no BM this shift  Plan for dialysis tomorrow   Patient updated on plan of care

## 2023-10-03 NOTE — PROGRESS NOTES
Reviewed films today in cath lab with Dr. Valentino Fletcher vessel runoff via peroneal with good collaterals to foot - no vascular surgical intervention will be required    Does not require pharmacologic perfusion imaging at this time    Will sign off    Follow up will be with his own cardiologists upon discharge

## 2023-10-03 NOTE — OPERATIVE REPORT
Pre-procedure DX: Gangrene right foot. Post-procedure Dx: Same with patent right Iliac/ CFA/ PFA/ SFA and stent/ popliteal/ peroneal/plantar arch. Right AT? PT occluded. Mynx closure left CFA. Surgeon: Maciel Olivas.  43cc contrast

## 2023-10-04 ENCOUNTER — ANESTHESIA EVENT (OUTPATIENT)
Dept: SURGERY | Facility: HOSPITAL | Age: 78
End: 2023-10-04
Payer: MEDICARE

## 2023-10-04 LAB
ANION GAP SERPL CALC-SCNC: 9 MMOL/L (ref 0–18)
BUN BLD-MCNC: 24 MG/DL (ref 7–18)
CALCIUM BLD-MCNC: 8.3 MG/DL (ref 8.5–10.1)
CHLORIDE SERPL-SCNC: 101 MMOL/L (ref 98–112)
CO2 SERPL-SCNC: 26 MMOL/L (ref 21–32)
CREAT BLD-MCNC: 5.74 MG/DL
EGFRCR SERPLBLD CKD-EPI 2021: 7 ML/MIN/1.73M2 (ref 60–?)
GLUCOSE BLD-MCNC: 124 MG/DL (ref 70–99)
OSMOLALITY SERPL CALC.SUM OF ELEC: 287 MOSM/KG (ref 275–295)
POTASSIUM SERPL-SCNC: 4.1 MMOL/L (ref 3.5–5.1)
SODIUM SERPL-SCNC: 136 MMOL/L (ref 136–145)

## 2023-10-04 PROCEDURE — 99232 SBSQ HOSP IP/OBS MODERATE 35: CPT | Performed by: INTERNAL MEDICINE

## 2023-10-04 RX ORDER — MIDODRINE HYDROCHLORIDE 10 MG/1
10 TABLET ORAL
Status: COMPLETED | OUTPATIENT
Start: 2023-10-04 | End: 2023-10-04

## 2023-10-04 RX ORDER — MIDODRINE HYDROCHLORIDE 10 MG/1
10 TABLET ORAL
Status: DISCONTINUED | OUTPATIENT
Start: 2023-10-04 | End: 2023-10-04

## 2023-10-04 NOTE — PROCEDURES
[facility]    PATIENT'S NAME: Vahid Grimes   ATTENDING PHYSICIAN: Makenna Soto M.D. OPERATING PHYSICIAN: Marianne Espino M.D. PATIENT ACCOUNT#:   [de-identified]    LOCATION:  [address] [place] [address]  MEDICAL RECORD #:   [id number]       YOB: 1945  ADMISSION DATE:       09/28/2023      OPERATION DATE:  10/03/2023    CARDIAC PROCEDURE TRANSCRIPTION    PREOPERATIVE DIAGNOSIS:  Gangrene of right foot with recent stent of the right superficial femoral artery and tibial vessel occlusive disease. POSTOPERATIVE DIAGNOSIS:  Gangrene of right foot with recent stent of the right superficial femoral artery and tibial vessel occlusive disease, with occluded right anterior tibial artery, posterior tibial artery, but a widely patent peroneal artery, as well as popliteal artery, superficial femoral artery, and superficial femoral artery stent. PROCEDURE PERFORMED:    1. Duplex ultrasound access of left common femoral artery with micropuncture technique. 2.   Placement of a RIM catheter at origin of right common iliac artery with right iliac common, external, internal, and femoral artery angiogram.  3.   Placement of a stiff Glidewire into the proximal right common femoral artery with placement of a Glidecath in this area with angiogram of the right iliofemoral bifurcation with oblique and AP views. 4.   Placement of a Glidewire into the superficial femoral artery with a Glidecath in the origin of the proximal superficial femoral artery with angiogram of the superficial femoral artery, popliteal artery, tibial vessels, and magnification views of the plantar arch. 5.   Mynx closure of the left femoral artery that was done with Dr. Jody Moulton. Other procedure was done by myself.     INDICATION:  This is a 57-year-old white female known to me for a history of right carotid endarterectomy and vein patch, who came into the emergency room with gangrene, ulceration of right foot with osteomyelitis of the right foot.  The patient was evaluated by Podiatry and recommended for lower leg revascularization or evaluation prior to foot surgery. Duplex ultrasound showed normal flow going into the area of the popliteal artery, but dampened as it went from the popliteal artery down to the foot. She was recommended for a diagnostic angiogram to evaluate the circulation to see if any improvement can be done. She had a recent stent placed at [facility] [place] by Dr. Ty Britton. Risks and complications of this procedure including arterial injury, arterial thrombosis, distal embolization, recurrent stenosis, bleeding, infection, and cardiac complications were all explained to the patient. She understood and agreed. All questions answered. SEDATION:  The patient received 3 mg of Versed and 50 mcg of fentanyl. Procedure started at [phone] and ended at [phone]. DESCRIPTION OF PROCEDURE:  The patient was placed supine on procedure table in the cardiac catheterization lab with IV conscious sedation with monitoring of blood pressure, heart rate, pulse oximetry by an RN under my supervision. She had the left groin prepped and draped in the usual sterile technique. She actually had it prepped again at the end with ChloraPrep. A duplex ultrasound with micropuncture technique was done after the area was anesthetized with approximately 10 mL of 1% Xylocaine into the area of the femoral artery. A micropuncture wire and catheter were placed. An angiogram was done retrograde showing that the sheath was in the common femoral artery above the bifurcation. The patient then had a 3 mm J Terumo wire placed in the aorta. Then, a 5-Cameroonian sheath was placed and over this was placed a RIM catheter, which angiogram was done of the right common iliac artery, external iliac artery, and internal iliac artery at the origin of the common iliac artery showing these are widely patent going into the femoral artery.     A stiff Glidewire was placed through this into the area of the superficial femoral artery, and a Glidecath was placed in the origin of the common femoral artery. Repeat angiograms were done with AP and oblique views of the common femoral artery, which showed no evidence of any significant stenosis. There was some irregularity. A Glidewire was then placed in the area of the superficial femoral artery, and a Glidecath was advanced into the superficial femoral artery proximally. Angiograms were done of the superficial femoral artery to popliteal artery and the tibial vessels as well as plantar arch, and showed the stent in the superficial femoral artery is open. No evidence for any stenosis in the superficial femoral or popliteal artery. The peroneal artery was widely open going down to the area of the foot with the medial and lateral tarsal branches but mostly medial tarsal branch connecting to the posterior plantar circulation going into the foot. The posterior tibial artery was occluded at its origin. Anterior tibial artery was occluded just beyond its origin. No reconstitution seen of the dorsal pedal artery and really no reconstitution of posterior tibial arteries distally, as an excessive amount of collaterals were noted. Films were reviewed with Dr. Rosa Machado and decided at this time not to do anything further. The patient then had the wire and the catheter removed back, and a 5-Hungarian sheath was kept in place. Dr. Rosa Machado scrubbed in at this time to help placed a Mynx device. It was properly placed with good hemostasis obtained in the groin area. The patient had received 1 g of Ancef prior to procedure in light of the stent that was recently placed, and then the groin wound had received an additional 5 mL of 1% lidocaine during the procedure. Then had Dermabond placed on the wound with a dressing. The patient received 40 mL of contrast.  The DAP was 13,751, with fluoro time of 3.9 minutes.       Dictated By Yohan Silvestre Bebe Thacker M.D.  d: 10/03/2023 14:58:20  t: 10/03/2023 15:36:32  [facility] [id number]  [facility]/    cc: Monalisa Rasp, M.D. Dannie Halsted, M.D. Roni Sober, DPM

## 2023-10-04 NOTE — PLAN OF CARE
Assumed pt care this evening. Left groin access site soft w/o hematoma. Pedal pulse per doppler. CMS intact. Pt placed on 2LO2 per NC overnight. Plan for HD this AM. Pt updated on POC. Problem: Patient/Family Goals  Goal: Patient/Family Long Term Goal  Description: Patient's Long Term Goal: Discharge home    Interventions:  - Follow MD recommendations   - IV abx  - See additional Care Plan goals for specific interventions  Outcome: Progressing  Goal: Patient/Family Short Term Goal  Description: Patient's Short Term Goal: Pain management     Interventions:   - Request medication as needed   - See additional Care Plan goals for specific interventions  Outcome: Progressing     Problem: PAIN - ADULT  Goal: Verbalizes/displays adequate comfort level or patient's stated pain goal  Description: INTERVENTIONS:  - Encourage pt to monitor pain and request assistance  - Assess pain using appropriate pain scale  - Administer analgesics based on type and severity of pain and evaluate response  - Implement non-pharmacological measures as appropriate and evaluate response  - Consider cultural and social influences on pain and pain management  - Manage/alleviate anxiety  - Utilize distraction and/or relaxation techniques  - Monitor for opioid side effects  - Notify MD/LIP if interventions unsuccessful or patient reports new pain  - Anticipate increased pain with activity and pre-medicate as appropriate  Outcome: Progressing     Problem: RISK FOR INFECTION - ADULT  Goal: Absence of fever/infection during anticipated neutropenic period  Description: INTERVENTIONS  - Monitor WBC  - Administer growth factors as ordered  - Implement neutropenic guidelines  Outcome: Progressing     Problem: SAFETY ADULT - FALL  Goal: Free from fall injury  Description: INTERVENTIONS:  - Assess pt frequently for physical needs  - Identify cognitive and physical deficits and behaviors that affect risk of falls.   - [place] fall precautions as indicated by assessment.  - Educate pt/family on patient safety including physical limitations  - Instruct pt to call for assistance with activity based on assessment  - Modify environment to reduce risk of injury  - Provide assistive devices as appropriate  - Consider OT/PT consult to assist with strengthening/mobility  - Encourage toileting schedule  Outcome: Progressing     Problem: DISCHARGE PLANNING  Goal: Discharge to home or other facility with appropriate resources  Description: INTERVENTIONS:  - Identify barriers to discharge w/pt and caregiver  - Include patient/family/discharge partner in discharge planning  - Arrange for needed discharge resources and transportation as appropriate  - Identify discharge learning needs (meds, wound care, etc)  - Arrange for interpreters to assist at discharge as needed  - Consider post-discharge preferences of patient/family/discharge partner  - Complete POLST form as appropriate  - Assess patient's ability to be responsible for managing their own health  - Refer to Case Management Department for coordinating discharge planning if the patient needs post-hospital services based on physician/LIP order or complex needs related to functional status, cognitive ability or social support system  Outcome: Progressing

## 2023-10-04 NOTE — PLAN OF CARE
Pt Aox4  Pt denies pain  Ra   NSR  Dialysis    -3L removed and tolerated well   Dressing change done. Call light within reach   Bed alarm on and bed in lowest position. All of pt and family questions answered.

## 2023-10-04 NOTE — PLAN OF CARE
Assumed pt care this evening. Left groin access site soft w/o hematoma. Left pedal + right post tibial pulses pulses per doppler. CMS intact. Pt placed on 2LO2 per NC overnight. Medicated w/ norco for right foot pain w/ moderate relief. Plan for HD this AM. Pt updated on POC.      Problem: Patient/Family Goals  Goal: Patient/Family Long Term Goal  Description: Patient's Long Term Goal: Discharge home    Interventions:  - Follow MD recommendations   - IV abx  - See additional Care Plan goals for specific interventions  Outcome: Progressing  Goal: Patient/Family Short Term Goal  Description: Patient's Short Term Goal: Pain management     Interventions:   - Request medication as needed   - See additional Care Plan goals for specific interventions  Outcome: Progressing     Problem: PAIN - ADULT  Goal: Verbalizes/displays adequate comfort level or patient's stated pain goal  Description: INTERVENTIONS:  - Encourage pt to monitor pain and request assistance  - Assess pain using appropriate pain scale  - Administer analgesics based on type and severity of pain and evaluate response  - Implement non-pharmacological measures as appropriate and evaluate response  - Consider cultural and social influences on pain and pain management  - Manage/alleviate anxiety  - Utilize distraction and/or relaxation techniques  - Monitor for opioid side effects  - Notify MD/LIP if interventions unsuccessful or patient reports new pain  - Anticipate increased pain with activity and pre-medicate as appropriate  Outcome: Progressing     Problem: RISK FOR INFECTION - ADULT  Goal: Absence of fever/infection during anticipated neutropenic period  Description: INTERVENTIONS  - Monitor WBC  - Administer growth factors as ordered  - Implement neutropenic guidelines  Outcome: Progressing     Problem: SAFETY ADULT - FALL  Goal: Free from fall injury  Description: INTERVENTIONS:  - Assess pt frequently for physical needs  - Identify cognitive and physical deficits and behaviors that affect risk of falls.   - [place] fall precautions as indicated by assessment.  - Educate pt/family on patient safety including physical limitations  - Instruct pt to call for assistance with activity based on assessment  - Modify environment to reduce risk of injury  - Provide assistive devices as appropriate  - Consider OT/PT consult to assist with strengthening/mobility  - Encourage toileting schedule  Outcome: Progressing     Problem: DISCHARGE PLANNING  Goal: Discharge to home or other facility with appropriate resources  Description: INTERVENTIONS:  - Identify barriers to discharge w/pt and caregiver  - Include patient/family/discharge partner in discharge planning  - Arrange for needed discharge resources and transportation as appropriate  - Identify discharge learning needs (meds, wound care, etc)  - Arrange for interpreters to assist at discharge as needed  - Consider post-discharge preferences of patient/family/discharge partner  - Complete POLST form as appropriate  - Assess patient's ability to be responsible for managing their own health  - Refer to Case Management Department for coordinating discharge planning if the patient needs post-hospital services based on physician/LIP order or complex needs related to functional status, cognitive ability or social support system  Outcome: Progressing

## 2023-10-04 NOTE — PROGRESS NOTES
[facility]    PODIATRY PROGRESS NOTE    Danny Hubbard Patient Status:  Inpatient    1945 MRN [id number]   [facility] 7NE-A Attending Louise Bar MD   [facility] Day # 6 PCP  [place]     Date of Admission:  2023    History of Present Illness:  Patient seen this morning resting comfortably in her hospital bed. She is getting ready for her dialysis. Denies overnight events. She did have her angiogram yesterday by Dr. Jovel Dear. Patient does relate continued pain to her right second digit. She has been receiving dressing changes. Denying any other concerns    History:  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 ([address]     Coronary atherosclerosis     Dialysis patient ([address]     High cholesterol     History of blood transfusion     Leukemia (HCC)     Renal disorder     Right foot ulcer, with fat layer exposed ([address]      Past Surgical History:   Procedure Laterality Date    APPENDECTOMY      AV FISTULA OR GRAFT ARTERIAL Left     CABG      CATH PERCUTANEOUS  TRANSLUMINAL CORONARY ANGIOPLASTY      CHOLECYSTECTOMY      OTHER Right     big toe amputation     Family History   Problem Relation Age of Onset    Heart Disorder Father     Cancer Mother       reports that she has quit smoking. She has never used smokeless tobacco. She reports that she does not currently use alcohol. She reports that she does not use drugs. Allergies:    Epinephrine             OTHER (SEE COMMENTS)    Comment:Dental epi. Pain for 2 weeks after dental             procedure.     Medications:    Current Facility-Administered Medications:     epoetin rigoberto (Epogen, Procrit) 01195 UNIT/ML injection 10,000 Units, 10,000 Units, Intravenous, Once in dialysis    sodium chloride 0.9% infusion, , Intravenous, Continuous    acetaminophen (Tylenol) tab 650 mg, 650 mg, Oral, Q4H PRN **OR** HYDROcodone-acetaminophen (Norco) 5-325 MG per tab 1 tablet, 1 tablet, Oral, Q4H PRN **OR** HYDROcodone-acetaminophen (Norco) 5-325 MG per tab 2 tablet, 2 tablet, Oral, Q4H PRN    lidocaine (Xylocaine) 5 % ointment, , Topical, PRN    sodium chloride 0.9% infusion, , Intravenous, Continuous    carvedilol (Coreg) tab 6.25 mg, 6.25 mg, Oral, Daily Beta Blocker    heparin (Porcine) 5000 UNIT/ML injection 5,000 Units, 5,000 Units, Subcutaneous, Q8H CHEO    HYDROcodone-acetaminophen (Norco) 5-325 MG per tab 1 tablet, 1 tablet, Oral, Q6H PRN    allopurinol (Zyloprim) tab 300 mg, 300 mg, Oral, Daily    aspirin DR tab 81 mg, 81 mg, Oral, Daily    atorvastatin (Lipitor) tab 10 mg, 10 mg, Oral, Nightly    calcium acetate (Phoslo) cap 667 mg, 1 capsule, Oral, TID CC    cinacalcet (Sensipar) tab 30 mg, 30 mg, Oral, Daily with breakfast    clopidogrel (Plavix) tab 75 mg, 75 mg, Oral, Daily    fenofibrate micronized (Lofibra) cap 67 mg, 67 mg, Oral, Daily with breakfast    ceFAZolin (Ancef) 2 g in 20mL IV syringe premix, 2 g, Intravenous, Q24H    acetaminophen (Tylenol Extra Strength) tab 500 mg, 500 mg, Oral, Q4H PRN    melatonin tab 3 mg, 3 mg, Oral, Nightly PRN    polyethylene glycol (PEG 3350) (Miralax) 17 g oral packet 17 g, 17 g, Oral, Daily PRN    sennosides (Senokot) tab 17.2 mg, 17.2 mg, Oral, Nightly PRN    bisacodyl (Dulcolax) 10 MG rectal suppository 10 mg, 10 mg, Rectal, Daily PRN    ondansetron (Zofran) 4 MG/2ML injection 4 mg, 4 mg, Intravenous, Q6H PRN    metoclopramide (Reglan) 5 mg/mL injection 5 mg, 5 mg, Intravenous, Q8H PRN    glycerin-hypromellose- (Artifical Tears) 0.2-0.2-1 % ophthalmic solution 1 drop, 1 drop, Both Eyes, QID PRN    sodium chloride (Saline Mist) 0.65 % nasal solution 1 spray, 1 spray, Each Nare, Q3H PRN    Review of Systems:  10 point ROS completed and was negative, except for pertinent positive and negatives stated in subjective. Physical Exam:  1. Integument: Dressings left intact today.   There is a known full-thickness ulceration present to the dorsal aspect of the right second digit with head of the proximal phalanx exposed. Subsecond metatarsal head ulceration present to the right foot that has not been probing to bone. 2. Vascular: Nonpalpable dorsalis pedis or posterior tibial pulses noted to the right foot. No hand-held Doppler available. CFT less than 5 seconds to all digits except second of the right foot, which was sluggish. 1+ pitting edema noted to right foot  3. Musculoskeletal: Pain with palpation to right second digit. 4. Neurological: Gross sensation intact via light touch bilaterally. Imaging:    CTA ABDOMEN/PELVIS LOWER EXT BILAT W RUNOFF (PKI=[id number])    Result Date: 10/1/2023  CONCLUSION:  1. Extensive diffuse atherosclerotic calcification. The degree of calcification limits evaluation by CT angiography, with nondiagnostic visualization of the infrapopliteal runoff vessels. Consider catheter angiography. 2. The extent of calcification of the superficial femoral and popliteal arteries is difficult to differentiate from vascular stents and clinical correlation is recommended. 3. No significant iliac artery stenosis. 4. Critical stenosis of proximal left renal artery and moderate stenosis of proximal right renal artery. 5. Moderate stenosis of distal right superficial femoral artery and diffuse narrowing of right popliteal artery. 6. Probable diffuse high-grade narrowing of heavily calcified right profunda femoral artery. 7. Focal stenoses estimated at 70% of the distal left superficial femoral artery and proximal and distal right popliteal artery. 8. 8 mm right middle lobe nodule. The 2017 guidelines from the [address] [place] [address] for the follow-up and management of newly detected indeterminate pulmonary nodules in persons at least 28years of age depend on nodule size (average length and width) and underlying risk factors (including smoking and other risk factors).  Please consider the following recommendations after clinical assessment of risk factors. For 6-8mm nodules: In low risk patients, CT in 6 to 12 months, then consider CT in 18 to 24 months. In high risk patients, CT in 6 to 12 months, then obtain CT in 18 to 24 months. 9. Uncomplicated colonic diverticula. LOCATION:  [facility]   Dictated by (CST): Lorie Martin MD on 10/01/2023 at 8:34 AM     Finalized by (CST): Lorie Martin MD on 10/01/2023 at 9:08 AM       [address] MAP LOWER EXTREMITY BILAT (CPT=93970)    Result Date: 9/30/2023  CONCLUSION:  No vascular flow identified in the greater and lesser saphenous vein bilaterally. LOCATION:  [facility]     Dictated by (CST): Khadar Pruitt MD on 9/30/2023 at 1:53 PM     Finalized by (CST): Khadar Pruitt MD on 9/30/2023 at 1:56 PM       [facility] 3 (AOR=[id number])    Result Date: 9/30/2023  CONCLUSION:  Elevated velocities in the left proximal and mid ICA. The mid left ICA velocity shows stenosis estimate 50-69% stenosis, and for the proximal left ICA greater than 70% stenosis. No sign of hemodynamically significant stenosis on the right. LOCATION:  [facility]     Dictated by (CST): Khadar Pruitt MD on 9/30/2023 at 1:51 PM     Finalized by (CST): Khadar Pruitt MD on 9/30/2023 at 1:52 PM       MRI FOOT, RIGHT (RPG=[id number])    Result Date: 9/29/2023  CONCLUSION:   High T2 signal extending from the distal aspect of the proximal phalanx of the 2nd toe, middle and distal phalanx is noted. There is low T1 signal at the proximal to phalangeal joint. This region of low T1 signal proximal to phalangeal joint with the high T2 signal may represent developing osteomyelitis at this location. This is new when compared to prior examination from 12/19/2019.    LOCATION:  [facility]   Dictated by (CST): Juan Moreno MD on 9/29/2023 at 9:18 PM     Finalized by (CST): Juan Moreno MD on 9/29/2023 at 9:25 PM       [facility] (HBW=[id number])    Result Date: 9/29/2023  CONCLUSION:  There is a significant stenosis at the right profunda femoral artery with a velocity ratio of 159 to 297 cm/second. LOCATION:  [facility]    Dictated by (CST): Jeremie Samuel MD on 9/29/2023 at 1:18 PM     Finalized by (CST): Jeremie Samuel MD on 9/29/2023 at 1:21 PM       XR FOOT, COMPLETE (MIN 3 VIEWS), RIGHT (CPT=73630)    Result Date: 9/29/2023  CONCLUSION:  1. No acute fractures. 2. No cortical irregularities to suggest osteomyelitis but if clinical concern is high, consider MRI. 3. Osteoarthritic changes. 4. Stable postoperative changes within the 1st digit. LOCATION:  [facility]   Dictated by (CST): Chantel Aguero MD on 9/29/2023 at 9:51 AM     Finalized by (CST): Chantel Aguero MD on 9/29/2023 at 9:52 AM          Laboratory Data:  Recent Labs   Lab 10/02/23  1631   RBC 2.51*   HGB 9.5*   HCT 28.1*   .0*   MCH 37.8*   MCHC 33.8   RDW 19.1   NEPRELIM 4.53   WBC 7.2   .0*       Impression:  Patient Active Problem List:     Gangrene of toe of right foot ([facility])     Ischemic pain of foot, right     PAD (peripheral artery disease) ([facility])     Great toe amputation status, right     Gangrene of right foot ([address]     ESRD (end stage renal disease) ([address]     Primary hypertension     Anemia in ESRD (end-stage renal disease)      Carotid stenosis, right     Osteomyelitis, unspecified site, unspecified type ([address]     Ulcer of right foot, unspecified ulcer stage ([address]     CLL (chronic lymphocytic leukemia) ([address]        Plan:  -Patient seen this morning resting comfortably in her hospital bed. No overnight events. Continued pain to right second digit. No new concerns. Wounds remain stable with no current purulence or signs of abscess.    -Patient has MRI findings consistent with osteomyelitis of the right second digit phalanges with exposed proximal phalanx. No concerns for osteomyelitis within the second metatarsal.    -Patient received an angiogram yesterday with Dr. Alan Mosley.   Discussed case with Dr. Alan Mosley, who has cleared patient for surgical procedure.    -Discussed further treatment recommendations with patient today. She also called and I spoke with her daughter at the bedside. I am recommending right second digit amputation with debridement of right plantar foot wound with possible graft application. The procedure was discussed with patient in detail and all of her's and her daughter's questions and concerns were addressed. Patient is agreeable with surgical plan. -Plan is to take patient to the OR tomorrow (10/5/2023) afternoon. I will have her start n.p.o. following breakfast.  In the meantime, recommend patient    -Continue receiving daily dressing changes with Betadine and dry dressing and weight-bear as tolerated in a surgical shoe to right lower extremity. The patient has been advised of the approximate disability involved for these procedures. In addition, the patient has been advised as to the alternatives of care, including continued conservative care as well as surgical procedures. The patient does have exposed bone to her right second digit with MRI findings consistent with osteomyelitis of the second digit, along with a right plantar foot ulceration that does not probe to bone. The patient understands that if surgical procedures are performed, there are risks and complications that could occur, including but not limited to: hematoma formation, damage to the nervous system, seroma formation, development of a DVT or phlebitis, infection, painful scar tissue formation, impaired healing, swelling, continued pain, loss of limb, loss of life, and the possibility that future surgery may need to be performed. The patient was given the opportunity to ask questions which were answered. The patient voiced no concerns and will consider all these options. Patient desires surgical intervention. No guarantees were given or implied. Pt consented freely to surgical intervention.   I spent approximately 35 minutes discussing the surgical procedures at length. I reviewed in detail the procedure to be performed, postoperative course, disability to be expected, healing time, prognosis and the importance of their following the recommended postoperative care. Possible complications discussed included but not limited to recurrence of deformity, postoperative pain, swelling, stiffness, rejection of the implant if utilized, return to surgery, infection, residual pain, possible blood clot formation, bleeding, etc. Possible complications relating to over activity and limitations during the postoperative period were reviewed. The patient has responsibilities to help insure successful outcome of the surgery. Postoperative oral and written instructions were reviewed and postoperative course of treatment discussed in detail. Management of postoperative pain was discussed. All questions related to preoperative, operative and postoperative course of treatment were answered to their understanding and satisfaction. RTO and follow up after surgery is necessary and expected and agreed to by the patient. The patient acknowledged understanding of the above and agrees to follow all instructions and protocols. No guarantee or warranty was given or implied as to the results of the surgery. Consent was reviewed.       Leora Oswald DPM   10/4/2023  8:03 AM

## 2023-10-05 ENCOUNTER — APPOINTMENT (OUTPATIENT)
Dept: GENERAL RADIOLOGY | Facility: HOSPITAL | Age: 78
End: 2023-10-05
Attending: PODIATRIST
Payer: MEDICARE

## 2023-10-05 ENCOUNTER — ANESTHESIA (OUTPATIENT)
Dept: SURGERY | Facility: HOSPITAL | Age: 78
End: 2023-10-05
Payer: MEDICARE

## 2023-10-05 PROCEDURE — 0JBQ0ZZ EXCISION OF RIGHT FOOT SUBCUTANEOUS TISSUE AND FASCIA, OPEN APPROACH: ICD-10-PCS | Performed by: PODIATRIST

## 2023-10-05 PROCEDURE — 99232 SBSQ HOSP IP/OBS MODERATE 35: CPT | Performed by: INTERNAL MEDICINE

## 2023-10-05 PROCEDURE — 3E0T3BZ INTRODUCTION OF ANESTHETIC AGENT INTO PERIPHERAL NERVES AND PLEXI, PERCUTANEOUS APPROACH: ICD-10-PCS | Performed by: PODIATRIST

## 2023-10-05 PROCEDURE — 0Y6R0Z0 DETACHMENT AT RIGHT 2ND TOE, COMPLETE, OPEN APPROACH: ICD-10-PCS | Performed by: PODIATRIST

## 2023-10-05 PROCEDURE — 0HRMXK3 REPLACEMENT OF RIGHT FOOT SKIN WITH NONAUTOLOGOUS TISSUE SUBSTITUTE, FULL THICKNESS, EXTERNAL APPROACH: ICD-10-PCS | Performed by: PODIATRIST

## 2023-10-05 PROCEDURE — 73620 X-RAY EXAM OF FOOT: CPT | Performed by: PODIATRIST

## 2023-10-05 DEVICE — FISH-SKIN GRAFT FOR WOUND MANAGEMENT. KERECIS® MARIGEN™ IS INDICATED FOR THE MANAGEMENT OF WOUNDS INCLUDING: PARTIAL AND FULL-THICKNESS WOUNDS, PRESSURE ULCERS, VENOUS ULCERS, CHRONIC VASCULAR ULCERS, DIABETIC ULCERS, TRAUMA WOUNDS (ABRASIONS, LACERATIONS, SECOND-DEGREE BURNS, SKIN TEARS), SURGICAL WOUNDS (DONOR SITES/GRAFTS, POST-MOHS SURGERY, POST-LASER SURGERY, PODIATRIC, WOUND DEHISCENCE) AND DRAINING WOUNDS.IFU: HTTPS://WWW.KERECIS.COM/IFUS/IFU-KERECIS-MARIGEN/
Type: IMPLANTABLE DEVICE | Site: FOOT | Status: FUNCTIONAL
Brand: KERECIS® MARIGEN™

## 2023-10-05 RX ORDER — HYDROMORPHONE HYDROCHLORIDE 1 MG/ML
0.2 INJECTION, SOLUTION INTRAMUSCULAR; INTRAVENOUS; SUBCUTANEOUS EVERY 5 MIN PRN
Status: DISCONTINUED | OUTPATIENT
Start: 2023-10-05 | End: 2023-10-05 | Stop reason: HOSPADM

## 2023-10-05 RX ORDER — HYDROCODONE BITARTRATE AND ACETAMINOPHEN 5; 325 MG/1; MG/1
1 TABLET ORAL ONCE AS NEEDED
Status: DISCONTINUED | OUTPATIENT
Start: 2023-10-05 | End: 2023-10-05 | Stop reason: HOSPADM

## 2023-10-05 RX ORDER — ONDANSETRON 2 MG/ML
4 INJECTION INTRAMUSCULAR; INTRAVENOUS EVERY 6 HOURS PRN
Status: DISCONTINUED | OUTPATIENT
Start: 2023-10-05 | End: 2023-10-05 | Stop reason: HOSPADM

## 2023-10-05 RX ORDER — SODIUM CHLORIDE, SODIUM LACTATE, POTASSIUM CHLORIDE, CALCIUM CHLORIDE 600; 310; 30; 20 MG/100ML; MG/100ML; MG/100ML; MG/100ML
INJECTION, SOLUTION INTRAVENOUS CONTINUOUS
Status: DISCONTINUED | OUTPATIENT
Start: 2023-10-05 | End: 2023-10-05 | Stop reason: HOSPADM

## 2023-10-05 RX ORDER — HYDROMORPHONE HYDROCHLORIDE 1 MG/ML
0.4 INJECTION, SOLUTION INTRAMUSCULAR; INTRAVENOUS; SUBCUTANEOUS EVERY 5 MIN PRN
Status: DISCONTINUED | OUTPATIENT
Start: 2023-10-05 | End: 2023-10-05 | Stop reason: HOSPADM

## 2023-10-05 RX ORDER — HYDROCODONE BITARTRATE AND ACETAMINOPHEN 5; 325 MG/1; MG/1
2 TABLET ORAL ONCE AS NEEDED
Status: DISCONTINUED | OUTPATIENT
Start: 2023-10-05 | End: 2023-10-05 | Stop reason: HOSPADM

## 2023-10-05 RX ORDER — MIDODRINE HYDROCHLORIDE 10 MG/1
10 TABLET ORAL
Qty: 1 TABLET | Refills: 0 | Status: COMPLETED | OUTPATIENT
Start: 2023-10-06 | End: 2023-10-06

## 2023-10-05 RX ORDER — HYDROMORPHONE HYDROCHLORIDE 1 MG/ML
0.6 INJECTION, SOLUTION INTRAMUSCULAR; INTRAVENOUS; SUBCUTANEOUS EVERY 5 MIN PRN
Status: DISCONTINUED | OUTPATIENT
Start: 2023-10-05 | End: 2023-10-05 | Stop reason: HOSPADM

## 2023-10-05 RX ORDER — METOCLOPRAMIDE HYDROCHLORIDE 5 MG/ML
5 INJECTION INTRAMUSCULAR; INTRAVENOUS EVERY 8 HOURS PRN
Status: DISCONTINUED | OUTPATIENT
Start: 2023-10-05 | End: 2023-10-05 | Stop reason: HOSPADM

## 2023-10-05 RX ORDER — ACETAMINOPHEN 500 MG
1000 TABLET ORAL ONCE AS NEEDED
Status: DISCONTINUED | OUTPATIENT
Start: 2023-10-05 | End: 2023-10-05 | Stop reason: HOSPADM

## 2023-10-05 RX ORDER — LIDOCAINE HYDROCHLORIDE 10 MG/ML
INJECTION, SOLUTION EPIDURAL; INFILTRATION; INTRACAUDAL; PERINEURAL AS NEEDED
Status: DISCONTINUED | OUTPATIENT
Start: 2023-10-05 | End: 2023-10-05 | Stop reason: SURG

## 2023-10-05 RX ORDER — NALOXONE HYDROCHLORIDE 0.4 MG/ML
0.08 INJECTION, SOLUTION INTRAMUSCULAR; INTRAVENOUS; SUBCUTANEOUS AS NEEDED
Status: DISCONTINUED | OUTPATIENT
Start: 2023-10-05 | End: 2023-10-05 | Stop reason: HOSPADM

## 2023-10-05 RX ORDER — BUPIVACAINE HYDROCHLORIDE 5 MG/ML
INJECTION, SOLUTION EPIDURAL; INTRACAUDAL AS NEEDED
Status: DISCONTINUED | OUTPATIENT
Start: 2023-10-05 | End: 2023-10-05 | Stop reason: HOSPADM

## 2023-10-05 RX ADMIN — SODIUM CHLORIDE: 9 INJECTION, SOLUTION INTRAVENOUS at 16:42:00

## 2023-10-05 RX ADMIN — LIDOCAINE HYDROCHLORIDE 50 MG: 10 INJECTION, SOLUTION EPIDURAL; INFILTRATION; INTRACAUDAL; PERINEURAL at 16:41:00

## 2023-10-05 NOTE — PROGRESS NOTES
Notes reviewed. No procedure to improve flow- may be adequate for healing. On;ly peroneal open- but anterior tibial/ posterior tibial occluded- unable to bypass/ open endovascular.  Reviewed with Dr. Renetta Soria

## 2023-10-05 NOTE — PLAN OF CARE
Pt Aox4  Pt complains of right foot pain   -prn given   Ra   NSR  Right toe amputation, plantar debridement, and graft application   -see orders   -pt is stable, will continue to monitor. Call light within reach   Bed alarm on and bed in lowest position. All of pt and family questions answered.

## 2023-10-05 NOTE — PLAN OF CARE
Assume care at 1930  VSS  A&Ox4  NSR  RA  C/o pain in R. Foot.  PRN Norco given  Plan of care discussed with patient  NPO after breakfast  Plan for right second digit amputation

## 2023-10-06 ENCOUNTER — APPOINTMENT (OUTPATIENT)
Dept: ULTRASOUND IMAGING | Facility: HOSPITAL | Age: 78
End: 2023-10-06
Attending: INTERNAL MEDICINE
Payer: MEDICARE

## 2023-10-06 VITALS
HEIGHT: 60 IN | BODY MASS INDEX: 28.27 KG/M2 | DIASTOLIC BLOOD PRESSURE: 29 MMHG | OXYGEN SATURATION: 98 % | WEIGHT: 144 LBS | RESPIRATION RATE: 16 BRPM | SYSTOLIC BLOOD PRESSURE: 94 MMHG | HEART RATE: 73 BPM | TEMPERATURE: 98 F

## 2023-10-06 PROBLEM — Z89.421 STATUS POST AMPUTATION OF LESSER TOE OF RIGHT FOOT (HCC): Status: ACTIVE | Noted: 2023-10-06

## 2023-10-06 PROBLEM — Z89.421 STATUS POST AMPUTATION OF LESSER TOE OF RIGHT FOOT (HCC): Status: ACTIVE | Noted: 2023-01-01

## 2023-10-06 PROCEDURE — 99232 SBSQ HOSP IP/OBS MODERATE 35: CPT | Performed by: INTERNAL MEDICINE

## 2023-10-06 PROCEDURE — 93971 EXTREMITY STUDY: CPT | Performed by: INTERNAL MEDICINE

## 2023-10-06 PROCEDURE — 99239 HOSP IP/OBS DSCHRG MGMT >30: CPT | Performed by: INTERNAL MEDICINE

## 2023-10-06 RX ORDER — HYDROCODONE BITARTRATE AND ACETAMINOPHEN 5; 325 MG/1; MG/1
1 TABLET ORAL EVERY 4 HOURS PRN
Qty: 15 TABLET | Refills: 0 | Status: SHIPPED | OUTPATIENT
Start: 2023-10-06

## 2023-10-06 RX ORDER — CEPHALEXIN 500 MG/1
500 CAPSULE ORAL 2 TIMES DAILY
Qty: 20 CAPSULE | Refills: 0 | Status: SHIPPED | OUTPATIENT
Start: 2023-10-06 | End: 2023-10-16

## 2023-10-06 NOTE — PROGRESS NOTES
[facility]    PODIATRY PROGRESS NOTE    Danny Hubbard Patient Status:  Inpatient    1945 MRN [id number]   [facility] 7NE-A Attending Louise Bar MD   [facility] Day # 8 PCP  [facility]     Date of Admission:  2023    History of Present Illness:  Patient was seen this morning resting comfortably in her hospital bed. POD 1 from a right second digit amputation with debridement of right plantar foot ulceration with application of Kerecis skin graft. She states that she is doing well overall. Pain is controlled. She has kept her dressings clean, dry, and intact with no signs of strikethrough. Denies overnight events and denies new complaints. History:  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 ([address]     Coronary atherosclerosis     Dialysis patient ([address]     High cholesterol     History of blood transfusion     Leukemia (HCC)     Renal disorder     Right foot ulcer, with fat layer exposed ([address]      Past Surgical History:   Procedure Laterality Date    APPENDECTOMY      AV FISTULA OR GRAFT ARTERIAL Left     CABG      CATH PERCUTANEOUS  TRANSLUMINAL CORONARY ANGIOPLASTY      CHOLECYSTECTOMY      OTHER Right     big toe amputation     Family History   Problem Relation Age of Onset    Heart Disorder Father     Cancer Mother       reports that she has quit smoking. She has never used smokeless tobacco. She reports that she does not currently use alcohol. She reports that she does not use drugs. Allergies:    Epinephrine             OTHER (SEE COMMENTS)    Comment:Dental epi. Pain for 2 weeks after dental             procedure.     Medications:    Current Facility-Administered Medications:     influenza vaccine high dose quad (Fluzone QIV HD) 0.7 mL IM injection (ages >/= 65 years) 0.7 mL, 0.7 mL, Intramuscular, Prior to discharge    epoetin rigoberto (Epogen, Procrit) 92045 UNIT/ML injection 10,000 Units, 10,000 Units, Intravenous, Once in dialysis    epoetin rigoberto (Epogen, Procrit) 39769 UNIT/ML injection 10,000 Units, 10,000 Units, Intravenous, Once in dialysis    sodium chloride 0.9% infusion, , Intravenous, Continuous    acetaminophen (Tylenol) tab 650 mg, 650 mg, Oral, Q4H PRN **OR** HYDROcodone-acetaminophen (Norco) 5-325 MG per tab 1 tablet, 1 tablet, Oral, Q4H PRN **OR** HYDROcodone-acetaminophen (Norco) 5-325 MG per tab 2 tablet, 2 tablet, Oral, Q4H PRN    lidocaine (Xylocaine) 5 % ointment, , Topical, PRN    sodium chloride 0.9% infusion, , Intravenous, Continuous    carvedilol (Coreg) tab 6.25 mg, 6.25 mg, Oral, Daily Beta Blocker    heparin (Porcine) 5000 UNIT/ML injection 5,000 Units, 5,000 Units, Subcutaneous, Q8H CHEO    HYDROcodone-acetaminophen (Norco) 5-325 MG per tab 1 tablet, 1 tablet, Oral, Q6H PRN    allopurinol (Zyloprim) tab 300 mg, 300 mg, Oral, Daily    aspirin DR tab 81 mg, 81 mg, Oral, Daily    atorvastatin (Lipitor) tab 10 mg, 10 mg, Oral, Nightly    calcium acetate (Phoslo) cap 667 mg, 1 capsule, Oral, TID CC    cinacalcet (Sensipar) tab 30 mg, 30 mg, Oral, Daily with breakfast    clopidogrel (Plavix) tab 75 mg, 75 mg, Oral, Daily    fenofibrate micronized (Lofibra) cap 67 mg, 67 mg, Oral, Daily with breakfast    ceFAZolin (Ancef) 2 g in 20mL IV syringe premix, 2 g, Intravenous, Q24H    acetaminophen (Tylenol Extra Strength) tab 500 mg, 500 mg, Oral, Q4H PRN    melatonin tab 3 mg, 3 mg, Oral, Nightly PRN    polyethylene glycol (PEG 3350) (Miralax) 17 g oral packet 17 g, 17 g, Oral, Daily PRN    sennosides (Senokot) tab 17.2 mg, 17.2 mg, Oral, Nightly PRN    bisacodyl (Dulcolax) 10 MG rectal suppository 10 mg, 10 mg, Rectal, Daily PRN    ondansetron (Zofran) 4 MG/2ML injection 4 mg, 4 mg, Intravenous, Q6H PRN    metoclopramide (Reglan) 5 mg/mL injection 5 mg, 5 mg, Intravenous, Q8H PRN    glycerin-hypromellose- (Artifical Tears) 0.2-0.2-1 % ophthalmic solution 1 drop, 1 drop, Both Eyes, QID PRN sodium chloride (Saline Mist) 0.65 % nasal solution 1 spray, 1 spray, Each Nare, Q3H PRN    Review of Systems:  10 point ROS completed and was negative, except for pertinent positive and negatives stated in subjective. Physical Exam:  General: NAD, appropriate and cooperative. Vascular: Distal pulses intact b/l. Integument: Dressings left intact today. No signs of strikethrough. Neurological: Sensation and motor function intact and symmetric compatible with pre-operative state. Musculoskeletal: Status post right second digit amputation. History of hallux amputation of the right lower extremity. Remainder of exam deferred      Imaging:  XR FOOT (2 VIEW), RIGHT (CPT=73620)    Result Date: 10/5/2023  CONCLUSION:  1. There has been interval amputation 2nd toe. There was previous amputation 1st toe and distal 1st metatarsal bone. 2. There are diffuse vascular calcifications noted. 3. There is otherwise no bone erosion.    LOCATION:  [facility]   Dictated by (CST): Ole Phillip MD on 10/05/2023 at 6:19 PM     Finalized by (CST): Ole Phillip MD on 10/05/2023 at 6:21 PM         Laboratory Data:  Recent Labs   Lab 10/02/23  1631   RBC 2.51*   HGB 9.5*   HCT 28.1*   .0*   MCH 37.8*   MCHC 33.8   RDW 19.1   NEPRELIM 4.53   WBC 7.2   .0*       Impression:  Patient Active Problem List:     Gangrene of toe of right foot (HCC)     Ischemic pain of foot, right     PAD (peripheral artery disease) (HCC)     Great toe amputation status, right     Gangrene of right foot ([address]     ESRD (end stage renal disease) ([address]     Primary hypertension     Anemia in ESRD (end-stage renal disease)      Carotid stenosis, right     Osteomyelitis, unspecified site, unspecified type ([address]     Ulcer of right foot, unspecified ulcer stage ([address]     CLL (chronic lymphocytic leukemia) ([address]     Status post amputation of lesser toe of right foot ([address]        Plan:  -POD 1 from a right second digit amputation with debridement of right plantar foot ulceration with application of Kerecis skin graft.    -Patient is doing well overall. Pain well controlled. Dressings intact with no strikethrough. -Patient will continue outpatient antibiotics per infectious disease. Surgical cultures are pending.    -Patient will keep dressings clean, dry, and intact until her first outpatient postoperative visit    -She will be weightbearing as tolerated with surgical shoe to her right foot. Recommend heel weightbearing and use of walker for gait assistance. -From podiatry standpoint, patient is okay for discharge. She should follow-up with me outpatient within 1 week from her surgery.     Eduardo Keen DPM   10/6/2023  1:12 PM

## 2023-10-06 NOTE — PLAN OF CARE
Assume care at 1930  VSS  A&Ox4  NSR  RA  C/o pain in R. Foot.  PRN Marvin given  R foot dressing C/I  Plan of care discussed with patient  Care endorse to PHILLIP Genao

## 2023-10-06 NOTE — OPERATIVE REPORT
[facility]     OPERATIVE NOTE     Kathy Haney Patient Status:  Inpatient    1945 MRN [id number]   [place] 7NE-A Attending Collette Ramon MD   Hosp Day # 7 PCP Kj Jimenez     Date of Surgery:  10/5/2023     Preoperative Diagnosis: osteomyelitis of the right second digit phalanx with exposed proximal phalanx, ulcer of right foot    Postoperative Diagnosis: osteomyelitis of the right second digit phalanx with exposed proximal phalanx,  ulcer of right foot    Primary Surgeon: Theresa Alberto DPM    Assistant: None    Procedures:  Amputation of second digit, right foot  Debridement of right foot ulceration with application of Kerecis skin graft    Surgical Findings: Majority of osseous structures to the right second digit were soft in nature and necrotic    Anesthesia: MAC    Complications: none    Estimated Blood Loss: Blood Output: 15 mL (10/5/2023  5:13 PM)      Implants: * No implants in log *    Specimen:   ID Type Source Tests Collected by Time Destination   1 : Right second digit clearance fragment Tissue Toe SURGICAL PATHOLOGY TISSUE Arelis Edouard DPM 10/5/2023  4:59 PM    2 : Right second digit Tissue Toe SURGICAL PATHOLOGY TISSUE Arelis Edouard DPM 10/5/2023  5:01 PM    A : RIGHT SECOND DIGIT FOR CULTURES Tissue Toe ANAEROBIC CULTURE, TISSUE AEROBIC CULTURE Arelis Edouard DPM 10/5/2023  4:58 PM        Drains: None    Condition: Stable      Preoperative history/indications:  Patient was seen on the hospital floor by Theresa Alberto DPM due to exposed proximal phalanx to her right second digit with ulceration to subsecond metatarsal head of the right foot. MRI obtained, revealing osteomyelitis of the right second digit with no current signs of osteomyelitis to the right second metatarsal.  Patient also had a right lower extremity angiogram prior to amputation.   Because of exposed bone and current signs of osteomyelitis within the right second digit, I recommended amputation of right second digit with debridement of right plantar foot ulceration with application of skin graft. The procedure was discussed with patient in detail. All of their questions and concerns were answered and the patient would like to proceed with surgical intervention. Informed Consent: All treatment options have been discussed with the patient of both conservative and surgical attempt at correction including the potential risks and complications of surgical intervention including but not exhaustive of death, loss of limb, post op pain, swelling, infection, bleeding, extended healing, and the possibility of further and future surgery. No guarantees have been made to the pt and the informed consent has been signed. Procedure in detail:  The patient was brought into the operating room and placed on the operating table in the supine position. Pre-operative antibiotics were given per SCIP protocols. A pneumatic tourniquet was utilized for this procedure. A formal timeout was performed and the OR staff were all in agreement. Following IV sedation, the right foot was then scrubbed, prepped, and draped in the usual aseptic manner. Local anesthesia was then obtained about the right second ray utilizing 10 cc's of 0.5% marcaine plain. Attention was directed to the right second digit where a circumferential incision was placed just distal to the MPJ down to bone. Care was taken to create a dorsal and plantar skin flap. Next, the toe was disarticulated at the level of the MPJ. The bone to the second digit appeared soft in nature with necrotic appearance. The amputated portion of the right second digit was then removed from the operative field and a portion of it was sent to microbiology and a portion of it was sent to pathology for further evaluation. At that time, the surgical site was flushed with copious amounts of sterile normal saline.   A clearance fragment was then obtained from the distal second metatarsal, and was passed from the operative field and sent to pathology for further evaluation. A final flush was performed with sterile normal saline and the incision site was approximated utilizing 3-0 nylon. Next, attention was directed to the plantar aspect of the right foot at site of patient's ulceration. No current signs of infection and wound site does not probe to bone. There was a hyperkeratotic border noted with a granular wound base. Utilizing a #15 blade, hyperkeratotic tissue was debrided and the ulcer itself was debrided down to and including the level of subcutaneous tissue. A healthy, bleeding granular wound bed was present. At this time, ulcer site was flushed with sterile normal saline. A Kerecis skin graft was then applied to the ulceration site and secured utilizing 3-0 nylon. Upon completion of the procedure, a postoperative block consisting of 10 cc's of 0.5% Marcaine plain was injected along the incision site. The incision was dressed with Xeroform and covered with sterile compressive dressings consisting of bulk 4 x 4 gauze plantarly, 4 x 4 gauze overlying second digit amputation site, and covered with Kerlix and Ace bandage. The pt tolerated the procedure and anesthesia well. They were transferred to the recovery room with VSS and vascular status intact to surgical extremity. Following a period of postoperative monitoring, the patient will be discharged back to the hospital for on the following written and oral postop instructions: keep dressings C/D/I, remain weightbearing as tolerated to surgical extremity in surgical shoe focusing on heel weightbearing, take prescriptions as prescribed, ice and elevate surgical extremity when at rest. Patient will contact my office for all postoperative f/u care and should any problems arise.       Ric Lindsey DPM   10/5/2023

## 2023-10-06 NOTE — PLAN OF CARE
Assumed care at 2300  Right foot dressing c/d/I, just had Norco.  On IV antibiotic  Plan: hemodialysis  C/o IV sites (x2)on the right arm pain, some redness noted on the sites. flushed ok. dressing changed. Pt refused to remove or start a new one. Per patient, she will go home today. Cold packs applied and elevate right arm on pillow. Norco given.   Problem: Patient/Family Goals  Goal: Patient/Family Long Term Goal  Description: Patient's Long Term Goal: Discharge home    Interventions:  - Follow MD recommendations   - IV abx  - See additional Care Plan goals for specific interventions  Outcome: Progressing  Goal: Patient/Family Short Term Goal  Description: Patient's Short Term Goal: Pain management     Interventions:   - Request medication as needed   - See additional Care Plan goals for specific interventions  Outcome: Progressing     Problem: PAIN - ADULT  Goal: Verbalizes/displays adequate comfort level or patient's stated pain goal  Description: INTERVENTIONS:  - Encourage pt to monitor pain and request assistance  - Assess pain using appropriate pain scale  - Administer analgesics based on type and severity of pain and evaluate response  - Implement non-pharmacological measures as appropriate and evaluate response  - Consider cultural and social influences on pain and pain management  - Manage/alleviate anxiety  - Utilize distraction and/or relaxation techniques  - Monitor for opioid side effects  - Notify MD/LIP if interventions unsuccessful or patient reports new pain  - Anticipate increased pain with activity and pre-medicate as appropriate  Outcome: Progressing     Problem: RISK FOR INFECTION - ADULT  Goal: Absence of fever/infection during anticipated neutropenic period  Description: INTERVENTIONS  - Monitor WBC  - Administer growth factors as ordered  - Implement neutropenic guidelines  Outcome: Progressing     Problem: SAFETY ADULT - FALL  Goal: Free from fall injury  Description: INTERVENTIONS:  - Assess pt frequently for physical needs  - Identify cognitive and physical deficits and behaviors that affect risk of falls.   - [place] fall precautions as indicated by assessment.  - Educate pt/family on patient safety including physical limitations  - Instruct pt to call for assistance with activity based on assessment  - Modify environment to reduce risk of injury  - Provide assistive devices as appropriate  - Consider OT/PT consult to assist with strengthening/mobility  - Encourage toileting schedule  Outcome: Progressing     Problem: DISCHARGE PLANNING  Goal: Discharge to home or other facility with appropriate resources  Description: INTERVENTIONS:  - Identify barriers to discharge w/pt and caregiver  - Include patient/family/discharge partner in discharge planning  - Arrange for needed discharge resources and transportation as appropriate  - Identify discharge learning needs (meds, wound care, etc)  - Arrange for interpreters to assist at discharge as needed  - Consider post-discharge preferences of patient/family/discharge partner  - Complete POLST form as appropriate  - Assess patient's ability to be responsible for managing their own health  - Refer to Case Management Department for coordinating discharge planning if the patient needs post-hospital services based on physician/LIP order or complex needs related to functional status, cognitive ability or social support system  Outcome: Progressing

## 2023-10-06 NOTE — PLAN OF CARE
Assumed care at 0730. A&O 4. Room air. Regular diet. L arm prec. Dialysis scheduled for this shift. See managed orders for more details. Tele- NSR.  VTE- Heparin. See MAR for more details. PRN pain meds utilized this shift. See MAR for more details. HOB maintained greater than 30 degrees. R and L posterior tibial pulse and pedal pulses per doppler. R pedal pulse KELLY d/t dressing. Per podiatry, first dressing change to be OP. See note for more details. Ancef Q 24 hr. See MAR for more details. 1 assist with the walker and WBAT as tolerated to RLE d/t R toe amputation. Surgical shoe utilized with ambulation. Surgical pathology tissue pending. See results and managed orders for more details. Pt oriented to the room, safety prec initiated, bed is in the lowest position and call light within reach. Pt updated on the plan of care. All needs met at this time. 1311: Order placed for US doppler of the R arm. See managed orders and results for more details.      Problem: Patient/Family Goals  Goal: Patient/Family Long Term Goal  Description: Patient's Long Term Goal: Discharge home  Interventions:  - Follow MD recommendations   - IV abx  - See additional Care Plan goals for specific interventions  Outcome: Progressing  Goal: Patient/Family Short Term Goal  Description: Patient's Short Term Goal: Pain management  Interventions:   - Request medication as needed   - See additional Care Plan goals for specific interventions  Outcome: Progressing     Problem: PAIN - ADULT  Goal: Verbalizes/displays adequate comfort level or patient's stated pain goal  Description: INTERVENTIONS:  - Encourage pt to monitor pain and request assistance  - Assess pain using appropriate pain scale  - Administer analgesics based on type and severity of pain and evaluate response  - Implement non-pharmacological measures as appropriate and evaluate response  - Consider cultural and social influences on pain and pain management  - Manage/alleviate anxiety  - Utilize distraction and/or relaxation techniques  - Monitor for opioid side effects  - Notify MD/LIP if interventions unsuccessful or patient reports new pain  - Anticipate increased pain with activity and pre-medicate as appropriate  Outcome: Progressing     Problem: RISK FOR INFECTION - ADULT  Goal: Absence of fever/infection during anticipated neutropenic period  Description: INTERVENTIONS  - Monitor WBC  - Administer growth factors as ordered  - Implement neutropenic guidelines  Outcome: Progressing     Problem: SAFETY ADULT - FALL  Goal: Free from fall injury  Description: INTERVENTIONS:  - Assess pt frequently for physical needs  - Identify cognitive and physical deficits and behaviors that affect risk of falls.   - [place] fall precautions as indicated by assessment.  - Educate pt/family on patient safety including physical limitations  - Instruct pt to call for assistance with activity based on assessment  - Modify environment to reduce risk of injury  - Provide assistive devices as appropriate  - Consider OT/PT consult to assist with strengthening/mobility  - Encourage toileting schedule  Outcome: Progressing     Problem: DISCHARGE PLANNING  Goal: Discharge to home or other facility with appropriate resources  Description: INTERVENTIONS:  - Identify barriers to discharge w/pt and caregiver  - Include patient/family/discharge partner in discharge planning  - Arrange for needed discharge resources and transportation as appropriate  - Identify discharge learning needs (meds, wound care, etc)  - Arrange for interpreters to assist at discharge as needed  - Consider post-discharge preferences of patient/family/discharge partner  - Complete POLST form as appropriate  - Assess patient's ability to be responsible for managing their own health  - Refer to Case Management Department for coordinating discharge planning if the patient needs post-hospital services based on physician/LIP order or complex needs related to functional status, cognitive ability or social support system  Outcome: Progressing

## 2023-10-06 NOTE — CM/SW NOTE
Chart reviewed for DC planning, discussed with RN. Pt is s/p R second digit amputation with debridement of right plantar foot ulceration with application of Kerecis skin graft with Podiatry. Podiatry to manage wound care and activity. ID reports DC with PO abx. Pt receiving HD today-- typically MWF at [facility].     Pt expected to DC today. No needs anticipated. Will follow up on outpt appointment.      REGINA Vaughn  Discharge Planner  [id number]

## 2023-10-07 NOTE — PLAN OF CARE
Pt discharge to home per MD order.    Discharge information, medication, prescription, and follow up information reviewed with patient and family member  Patient and family member verbalize understanding   Pt left with all belonging   Pt left by wheelchair

## 2023-10-11 ENCOUNTER — OFFICE VISIT (OUTPATIENT)
Facility: LOCATION | Age: 78
End: 2023-10-11

## 2023-10-11 DIAGNOSIS — L97.519 ULCER OF RIGHT FOOT, UNSPECIFIED ULCER STAGE (HCC): ICD-10-CM

## 2023-10-11 DIAGNOSIS — Z89.421 STATUS POST AMPUTATION OF LESSER TOE OF RIGHT FOOT (HCC): ICD-10-CM

## 2023-10-11 DIAGNOSIS — Z98.890 POSTOPERATIVE STATE: Primary | ICD-10-CM

## 2023-10-13 NOTE — PROGRESS NOTES
[facility]  Progress Note    Axel Small is a 66year old female. Patient presents with:  Post-Op: Patient was seen in the hospital. Sx 10/5/23, right 2nd digit amputation. Patient is in a surgical shoe and bandage. She  denies any pain today in the clinic. HPI:     Patient is a very pleasant 79-year-old female who is coming to clinic today status post 1 week from right second digit amputation with right plantar foot ulceration debridement with application of Kerecis skin graft (DOS: 10/5/2023). Patient states that she is doing very well overall. Denies any pain. She is weightbearing as tolerated in surgical shoe utilizing a walker. She has kept her bandages clean, dry, and intact. Denying any new concerns and is here for further evaluation and care. Denying recent nausea, vomiting, fever, chills, shortness of breath, chest pain, calf pain. Allergies: Epinephrine   Current Outpatient Medications   Medication Sig Dispense Refill    cephalexin 500 MG Oral Cap Take 1 capsule (500 mg total) by mouth 2 (two) times daily for 10 days. 20 capsule 0    HYDROcodone-acetaminophen 5-325 MG Oral Tab Take 1 tablet by mouth every 4 (four) hours as needed. 15 tablet 0    polyethylene glycol, PEG 3350, 17 g Oral Powd Pack Take 17 g by mouth daily. acetaminophen 325 MG Oral Tab Take 1 tablet (325 mg total) by mouth every 6 (six) hours as needed for Pain. allopurinol 300 MG Oral Tab Take 1 tablet (300 mg total) by mouth daily. aspirin 81 MG Oral Tab EC Take 1 tablet (81 mg total) by mouth daily. atorvastatin 10 MG Oral Tab Take 1 tablet (10 mg total) by mouth nightly. calcium acetate 667 MG Oral Cap Take 1 capsule (667 mg total) by mouth 3 (three) times daily with meals. cinacalcet 30 MG Oral Tab Take 1 tablet (30 mg total) by mouth daily with breakfast.      clopidogrel 75 MG Oral Tab Take 1 tablet (75 mg total) by mouth daily.       fenofibrate 145 MG Oral Tab Take 1 tablet (145 mg total) by mouth daily. imatinib 100 MG Oral Tab Take 3 tablets (300 mg total) by mouth daily. Magnesium 500 MG Oral Tab Take by mouth.      cyanocobalamine 1000 MCG Oral Tab Take 1 tablet (1,000 mcg total) by mouth daily. cholecalciferol 50 MCG (2000 UT) Oral Cap Take 1 capsule (2,000 Units total) by mouth daily. carvedilol 25 MG Oral Tab Take 6.25 mg by mouth 2 (two) times daily with meals. Per patient, nephrologist decreased to once daily. 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 ([facility]     Coronary atherosclerosis     Dialysis patient ([facility] [address]     High cholesterol     History of blood transfusion     Leukemia (HCC)     Renal disorder     Right foot ulcer, with fat layer exposed ([facility] [address]       Past Surgical History:   Procedure Laterality Date    APPENDECTOMY      AV FISTULA OR GRAFT ARTERIAL Left     CABG      CATH PERCUTANEOUS  TRANSLUMINAL CORONARY ANGIOPLASTY      CHOLECYSTECTOMY      OTHER Right     big toe amputation      Family History   Problem Relation Age of Onset    Heart Disorder Father     Cancer Mother       Social History    Socioeconomic History      Marital status:     Tobacco Use      Smoking status: Former      Smokeless tobacco: Never    Vaping Use      Vaping Use: Never used    Substance and Sexual Activity      Alcohol use: Not Currently        Comment: 1 drink yearly      Drug use: No          REVIEW OF SYSTEMS:     10 point ROS completed and was negative, except for pertinent positive and negatives stated in subjective. EXAM:   General: NAD, appropriate and cooperative. Vascular: Distal pulses intact b/l. Integument: Incision well approximated with sutures intact. No dehiscence or drainage, no SOI. Right plantar foot ulceration shows no current signs of infection. Graft continues to incorporate with sutures intact prior to removal today.   Neurological: Sensation and motor function intact and symmetric compatible with pre-operative state. Musculoskeletal: Status post right second digit amputation. 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 Patient is status post right second digit amputation with debridement of right plantar foot ulcer and application of Kerecis skin graft    Patient is doing well overall. Sutures/staples intact with no current signs of infection or dehiscence. Sutures to graft removed today without incident. Surgical site at second digit amputation covered with Xeroform and a dry sterile dressing. We will have patient keep dressings clean, dry, and intact until next visit    Patient will remain weightbearing as tolerated to surgical extremity in surgical shoe at this time utilizing a walker for gait assistance. Patient will continue with medications as prescribed    Recommend continue resting, icing, elevating surgical extremity    Educated patient on signs of infection and encouraged them to contact my office and seek immediate medical attention if noticing any of the signs.        -The patient indicates understanding of these issues and agrees to the plan. Time spent reviewing pertinent information from patient's chart, reviewing any pertinent imaging, obtaining history and physical exam, and discussing and mutually agreeing on a treatment plan: 20 minutes    RTC 1 week for possible suture removal      SUZAN Abreu [facility]        10/13/2023    Dragon speech recognition software was used to prepare this note. Errors in word recognition may occur. Please contact me with any questions/concerns with this note.

## 2023-10-18 ENCOUNTER — OFFICE VISIT (OUTPATIENT)
Facility: LOCATION | Age: 78
End: 2023-10-18

## 2023-10-18 DIAGNOSIS — M79.672 LEFT FOOT PAIN: ICD-10-CM

## 2023-10-18 DIAGNOSIS — Z89.421 STATUS POST AMPUTATION OF LESSER TOE OF RIGHT FOOT (HCC): ICD-10-CM

## 2023-10-18 DIAGNOSIS — L97.519 ULCER OF RIGHT FOOT, UNSPECIFIED ULCER STAGE (HCC): ICD-10-CM

## 2023-10-18 DIAGNOSIS — Z98.890 POSTOPERATIVE STATE: Primary | ICD-10-CM

## 2023-10-18 PROCEDURE — 11042 DBRDMT SUBQ TIS 1ST 20SQCM/<: CPT | Performed by: PODIATRIST

## 2023-10-18 RX ORDER — HYDROCODONE BITARTRATE AND ACETAMINOPHEN 5; 325 MG/1; MG/1
1 TABLET ORAL EVERY 6 HOURS PRN
Qty: 28 TABLET | Refills: 0 | Status: SHIPPED | OUTPATIENT
Start: 2023-10-18 | End: 2023-10-25

## 2023-10-18 NOTE — PROGRESS NOTES
[facility] Podiatry  Progress Note    Marguerite Ghotra is a 66year old female. Patient presents with:  Post-Op: S/P 2nd visit. Sx 10/5/23, right 2nd digit amputation. Pain increases with activity and if feet are down, 6-8/10. Denies numbness and tingling. HPI:     Is a very pleasant 26-year-old female who is returning to clinic today status post 2 weeks from right second digit amputation with right plantar foot ulceration debridement with application of Kerecis skin graft (DOS: 10/5/2023). Patient states that she continues to do well overall. Patient does state that she has pain to the left foot, particularly where her plantar wound is. She has ran out of pain medication and is being for more today. Patient does relate trying to get around a little more, which does increase her pain. Rates the pain 6-8/10. She is weightbearing in her surgical shoe utilizing a walker. She has kept her dressings clean, dry, and intact. Denying any other concerns and is here today for further evaluation and care. Denies recent nausea, vomiting, fever, chills. Allergies: Epinephrine   Current Outpatient Medications   Medication Sig Dispense Refill    HYDROcodone-acetaminophen 5-325 MG Oral Tab Take 1 tablet by mouth every 6 (six) hours as needed for Pain. 28 tablet 0    HYDROcodone-acetaminophen 5-325 MG Oral Tab Take 1 tablet by mouth every 4 (four) hours as needed. 15 tablet 0    polyethylene glycol, PEG 3350, 17 g Oral Powd Pack Take 17 g by mouth daily. acetaminophen 325 MG Oral Tab Take 1 tablet (325 mg total) by mouth every 6 (six) hours as needed for Pain. allopurinol 300 MG Oral Tab Take 1 tablet (300 mg total) by mouth daily. aspirin 81 MG Oral Tab EC Take 1 tablet (81 mg total) by mouth daily. atorvastatin 10 MG Oral Tab Take 1 tablet (10 mg total) by mouth nightly. calcium acetate 667 MG Oral Cap Take 1 capsule (667 mg total) by mouth 3 (three) times daily with meals. cinacalcet 30 MG Oral Tab Take 1 tablet (30 mg total) by mouth daily with breakfast.      clopidogrel 75 MG Oral Tab Take 1 tablet (75 mg total) by mouth daily. fenofibrate 145 MG Oral Tab Take 1 tablet (145 mg total) by mouth daily. imatinib 100 MG Oral Tab Take 3 tablets (300 mg total) by mouth daily. Magnesium 500 MG Oral Tab Take by mouth.      cyanocobalamine 1000 MCG Oral Tab Take 1 tablet (1,000 mcg total) by mouth daily. cholecalciferol 50 MCG (2000 UT) Oral Cap Take 1 capsule (2,000 Units total) by mouth daily. carvedilol 25 MG Oral Tab Take 6.25 mg by mouth 2 (two) times daily with meals. Per patient, nephrologist decreased to once daily. 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 ([facility] [address]     Coronary atherosclerosis     Dialysis patient ([facility] [address]     High cholesterol     History of blood transfusion     Leukemia (HCC)     Renal disorder     Right foot ulcer, with fat layer exposed ([facility] [address]       Past Surgical History:   Procedure Laterality Date    APPENDECTOMY      AV FISTULA OR GRAFT ARTERIAL Left     CABG      CATH PERCUTANEOUS  TRANSLUMINAL CORONARY ANGIOPLASTY      CHOLECYSTECTOMY      OTHER Right     big toe amputation      Family History   Problem Relation Age of Onset    Heart Disorder Father     Cancer Mother       Social History    Socioeconomic History      Marital status:     Tobacco Use      Smoking status: Former      Smokeless tobacco: Never    Vaping Use      Vaping Use: Never used    Substance and Sexual Activity      Alcohol use: Not Currently        Comment: 1 drink yearly      Drug use: No          REVIEW OF SYSTEMS:     10 point ROS completed and was negative, except for pertinent positive and negatives stated in subjective. EXAM:   General: NAD, appropriate and cooperative. Vascular: Distal pulses intact b/l.    Integument: Incision well approximated with sutures intact prior to removal today. No dehiscence or drainage, no SOI. Right plantar foot ulceration shows no current signs of infection. Graft appears to have incorporated with no current signs of infection. Ulceration measures 0.4 cm x 0.5 cm x 0.2 cm. Periwound hyperkeratotic tissue noted. No purulence, no probing to bone. Neurological: Sensation and motor function intact and symmetric compatible with pre-operative state. Musculoskeletal: Status post right second digit amputation. Pain with palpation to left plantar foot ulceration. ASSESSMENT AND PLAN:   Diagnoses and all orders for this visit:    Postoperative state  -     HYDROcodone-acetaminophen 5-325 MG Oral Tab; Take 1 tablet by mouth every 6 (six) hours as needed for Pain. 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 Ulcer of right foot, unspecified ulcer stage ([address]  -     HYDROcodone-acetaminophen 5-325 MG Oral Tab; Take 1 tablet by mouth every 6 (six) hours as needed for Pain. Left foot pain  -     HYDROcodone-acetaminophen 5-325 MG Oral Tab; Take 1 tablet by mouth every 6 (six) hours as needed for Pain. Plan:   -Patient was seen and evaluated today in clinic. Patient is status post 2 weeks from right second digit amputation with debridement of right plantar foot ulceration and application of Kerecis skin graft. Patient is doing well overall. Sutures/staples intact with no current signs of infection or dehiscence. Sutures to amputation site were removed today without incident. Site covered with Steri-Strips. Left plantar foot ulceration was excisionally debrided today utilizing #15 blade down to and including subcutaneous tissue until a granular, bleeding wound bed was present.     Collagen applied to plantar foot ulceration today, Betadine painted to amputation site, and a left foot covered with 4 x 4 gauze, Nell Dunbar. We will have patient change dressings every other day in a similar fashion. Rx: Norco 5/325 mg 1 p.o. every 6 hours #28 as needed pain    Patient will remain weightbearing as tolerated focusing to the heel to surgical extremity in surgical shoe at this time. Offloading felt pads applied to surgical shoe today    Patient will continue with medications as prescribed    Recommend continue resting, icing, elevating surgical extremity    Educated patient on signs of infection and encouraged them to contact my office and seek immediate medical attention if noticing any of the signs.        -The patient indicates understanding of these issues and agrees to the plan. Time spent reviewing pertinent information from patient's chart, reviewing any pertinent imaging, obtaining history and physical exam, and discussing and mutually agreeing on a treatment plan: 25 minutes    RTC 2 weeks      SUZAN Martin [facility]        10/18/2023    Dragon speech recognition software was used to prepare this note. Errors in word recognition may occur. Please contact me with any questions/concerns with this note.

## 2023-10-30 ENCOUNTER — TELEPHONE (OUTPATIENT)
Dept: WOUND CARE | Facility: HOSPITAL | Age: 78
End: 2023-10-30

## 2023-10-30 NOTE — TELEPHONE ENCOUNTER
Spoke to the daughter today.Told her to follow up with Dr. Ba at his office.I cancel her mother's appointment to see the NP. I told her that  notes doesn't say to follow up at the wound care clinic.

## 2023-11-01 ENCOUNTER — OFFICE VISIT (OUTPATIENT)
Facility: LOCATION | Age: 78
End: 2023-11-01

## 2023-11-01 DIAGNOSIS — Z89.421 STATUS POST AMPUTATION OF LESSER TOE OF RIGHT FOOT (HCC): ICD-10-CM

## 2023-11-01 DIAGNOSIS — L97.519 ULCER OF RIGHT FOOT, UNSPECIFIED ULCER STAGE (HCC): Primary | ICD-10-CM

## 2023-11-01 DIAGNOSIS — Z98.890 POSTOPERATIVE STATE: ICD-10-CM

## 2023-11-01 DIAGNOSIS — M79.672 LEFT FOOT PAIN: ICD-10-CM

## 2023-11-01 PROCEDURE — 11042 DBRDMT SUBQ TIS 1ST 20SQCM/<: CPT | Performed by: PODIATRIST

## 2023-11-01 PROCEDURE — 1111F DSCHRG MED/CURRENT MED MERGE: CPT | Performed by: PODIATRIST

## 2023-11-02 ENCOUNTER — TELEPHONE (OUTPATIENT)
Dept: PODIATRY CLINIC | Facility: CLINIC | Age: 78
End: 2023-11-02

## 2023-11-02 NOTE — TELEPHONE ENCOUNTER
Per daughter pt is unable to get an mri and a follow up by Monday and asking what is the urgency of the mri.  Please advise

## 2023-11-02 NOTE — TELEPHONE ENCOUNTER
Patient seen in clinic on 11/1/23. MRI appears to be ordered. Note not completed just yet. Does MRI need to be completed sooner than 11/30/23.     Please advise

## 2023-11-02 NOTE — TELEPHONE ENCOUNTER
Wound does now probe to bone. Patient does not acutely look infected. Would prefer to have MRI done prior to 11/30 if this is possible. The sooner the better. Is there something that we can do to get MRI done before 11/30?

## 2023-11-03 NOTE — TELEPHONE ENCOUNTER
Called central scheduling and the soonest appt any location is 11/27. Order updated to STAT. Called Nancy(on HIPAA) and informed her per Dr Julian Santoyo orders and to call back to scheduling and they should get pt in within the next couple days. Cancelled the 11/30 MRI appt. She verbalized understanding.

## 2023-11-04 ENCOUNTER — HOSPITAL ENCOUNTER (OUTPATIENT)
Dept: MRI IMAGING | Facility: HOSPITAL | Age: 78
Discharge: HOME | End: 2023-11-04
Attending: PODIATRIST
Payer: MEDICARE

## 2023-11-04 DIAGNOSIS — L97.519 ULCER OF RIGHT FOOT, UNSPECIFIED ULCER STAGE (HCC): ICD-10-CM

## 2023-11-04 PROCEDURE — 73718 MRI LOWER EXTREMITY W/O DYE: CPT | Performed by: PODIATRIST

## 2023-11-04 NOTE — PROGRESS NOTES
[facility]  Progress Note    Rio Khalil is a 66year old female. Patient presents with:  Post-Op: Sx 10/5/23 right foot hallux and 2nd digit amputation. She also has discomfort on the left foot, spot on the bottom of left heel. Patient states she had a previous wound. HPI:     Patient is a very pleasant 51-year-old female who is returning to clinic today status post 4 weeks from right second digit amputation with right plantar foot ulceration debridement with application of Kerecis skin graft (DOS: 10/5/2023). Patient is accompanied by her daughter today. Patient is doing well overall. Patient has been applying collagen every other day with dry dressings to the right foot. Denies noticing any signs of infection. Denies any pain. Patient does have some minor discomfort to the left heel and would like it to be evaluated today. Denies noticing any drainage from left heel. Denies any other complaints and is here for further evaluation and care. Denies recent nausea, vomiting, fever, chills. Allergies: Epinephrine   Current Outpatient Medications   Medication Sig Dispense Refill    HYDROcodone-acetaminophen 5-325 MG Oral Tab Take 1 tablet by mouth every 4 (four) hours as needed. 15 tablet 0    polyethylene glycol, PEG 3350, 17 g Oral Powd Pack Take 17 g by mouth daily. acetaminophen 325 MG Oral Tab Take 1 tablet (325 mg total) by mouth every 6 (six) hours as needed for Pain. allopurinol 300 MG Oral Tab Take 1 tablet (300 mg total) by mouth daily. aspirin 81 MG Oral Tab EC Take 1 tablet (81 mg total) by mouth daily. atorvastatin 10 MG Oral Tab Take 1 tablet (10 mg total) by mouth nightly. calcium acetate 667 MG Oral Cap Take 1 capsule (667 mg total) by mouth 3 (three) times daily with meals. cinacalcet 30 MG Oral Tab Take 1 tablet (30 mg total) by mouth daily with breakfast.      clopidogrel 75 MG Oral Tab Take 1 tablet (75 mg total) by mouth daily. fenofibrate 145 MG Oral Tab Take 1 tablet (145 mg total) by mouth daily. imatinib 100 MG Oral Tab Take 3 tablets (300 mg total) by mouth daily. Magnesium 500 MG Oral Tab Take by mouth.      cyanocobalamine 1000 MCG Oral Tab Take 1 tablet (1,000 mcg total) by mouth daily. cholecalciferol 50 MCG (2000 UT) Oral Cap Take 1 capsule (2,000 Units total) by mouth daily. carvedilol 25 MG Oral Tab Take 6.25 mg by mouth 2 (two) times daily with meals. Per patient, nephrologist decreased to once daily. 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 ([facility] [address]     Coronary atherosclerosis     Dialysis patient ([facility] [address]     High cholesterol     History of blood transfusion     Leukemia (HCC)     Renal disorder     Right foot ulcer, with fat layer exposed ([facility] [address]       Past Surgical History:   Procedure Laterality Date    APPENDECTOMY      AV FISTULA OR GRAFT ARTERIAL Left     CABG      CATH PERCUTANEOUS  TRANSLUMINAL CORONARY ANGIOPLASTY      CHOLECYSTECTOMY      OTHER Right     big toe amputation      Family History   Problem Relation Age of Onset    Heart Disorder Father     Cancer Mother       Social History    Socioeconomic History      Marital status:     Tobacco Use      Smoking status: Former      Smokeless tobacco: Never    Vaping Use      Vaping Use: Never used    Substance and Sexual Activity      Alcohol use: Not Currently        Comment: 1 drink yearly      Drug use: No          REVIEW OF SYSTEMS:     10 point ROS completed and was negative, except for pertinent positive and negatives stated in subjective. EXAM:     General: NAD, appropriate and cooperative. Vascular: Distal pulses intact b/l. Integument: Right second digit amputation site remains well approximated, with small area of opening measuring approximately 0.3 x 0.2 x 0.1 cm. No dehiscence or drainage, no SOI.   Right plantar foot ulceration shows no current signs of infection. Wound does probe to bone today. Ulceration measures 0. 5 cm x 0.5 cm x 0. 4 cm. Periwound hyperkeratotic tissue noted. No purulence, no surrounding erythema. Left heel shows no open wounds today. Neurological: Sensation and motor function intact and symmetric compatible with pre-operative state. Musculoskeletal: Status post right second digit amputation. Minimal pain to right plantar foot ulceration. ASSESSMENT AND PLAN:   Diagnoses and all orders for this visit:    Ulcer of right foot, unspecified ulcer stage ([facility] [address]  -     Cancel: MRI FOOT, RIGHT (CPT=73718); Future  -     Wound Care Referral - External  -     MRI FOOT, RIGHT (CPT=73718); Future    Postoperative state    Status post amputation of lesser toe of right foot ([facility] [address]  -     Wound Care Referral - External    Left foot pain        Plan:   -Patient was seen and evaluated today in clinic. Patient is status post 4 weeks from right second digit amputation with debridement of right plantar foot ulceration and application of Kerecis skin graft. Patient is doing well overall. Very small opening to amputation site today. No current signs of infection. Minimal serosanguineous drainage. Left plantar foot ulceration was excisionally debrided today utilizing #15 blade down to and including subcutaneous tissue until a granular, bleeding wound bed was present. Left plantar foot wound does probe to bone today. No purulence or current signs of soft tissue infection. See above for postdebridement measurements. Because wound now probes to bone, I did recommend an updated MRI of the right foot. Patient is agreeable and MRI was ordered today. Patient's right second digit amputation site and plantar right foot ulceration covered with Betadine soaked gauze, Kerlix, and Ace bandage.      Patient will remain weightbearing as tolerated focusing to the heel to surgical extremity in surgical shoe with offloading felt pads at this time.    I do recommend we reestablish patient in the wound care center at this time. Patient is agreeable to this and a referral was placed today. I did explain to patient that I am concerned there may be bone infection starting in the second metatarsal due to wound now probing to bone. We will discuss further treatment options pending MRI results    -The patient indicates understanding of these issues and agrees to the plan. Time spent reviewing pertinent information from patient's chart, reviewing any pertinent imaging, obtaining history and physical exam, and discussing and mutually agreeing on a treatment plan: 25 minutes    RTC next week in wound care      SUZAN Aguilar [facility]        11/4/2023    Dragon speech recognition software was used to prepare this note. Errors in word recognition may occur. Please contact me with any questions/concerns with this note.

## 2023-11-06 ENCOUNTER — OFFICE VISIT (OUTPATIENT)
Dept: WOUND CARE | Facility: HOSPITAL | Age: 78
End: 2023-11-06
Attending: NURSE PRACTITIONER
Payer: MEDICARE

## 2023-11-06 ENCOUNTER — TELEPHONE (OUTPATIENT)
Dept: PODIATRY CLINIC | Facility: CLINIC | Age: 78
End: 2023-11-06

## 2023-11-06 VITALS
DIASTOLIC BLOOD PRESSURE: 75 MMHG | TEMPERATURE: 98 F | SYSTOLIC BLOOD PRESSURE: 119 MMHG | HEART RATE: 70 BPM | RESPIRATION RATE: 18 BRPM

## 2023-11-06 DIAGNOSIS — N18.6 ESRD (END STAGE RENAL DISEASE) (HCC): ICD-10-CM

## 2023-11-06 DIAGNOSIS — I73.9 PAD (PERIPHERAL ARTERY DISEASE) (HCC): ICD-10-CM

## 2023-11-06 DIAGNOSIS — Z89.421 STATUS POST AMPUTATION OF LESSER TOE OF RIGHT FOOT (HCC): ICD-10-CM

## 2023-11-06 DIAGNOSIS — L60.0 INGROWING NAIL, LEFT GREAT TOE: ICD-10-CM

## 2023-11-06 DIAGNOSIS — L97.516 NON-PRESSURE CHRONIC ULCER OF OTHER PART OF RIGHT FOOT WITH BONE INVOLVEMENT WITHOUT EVIDENCE OF NECROSIS (HCC): Primary | ICD-10-CM

## 2023-11-06 PROCEDURE — 11042 DBRDMT SUBQ TIS 1ST 20SQCM/<: CPT | Performed by: PODIATRIST

## 2023-11-06 NOTE — PROGRESS NOTES
Patient ID: Sylvester Duval is a 66year old female. Debridement Venous Ulcer Right;Plantar Foot   Wound 11/06/23 #2 Right plantar foot Foot Right;Plantar    Performed by: Allison Smith DPM  Authorized by: Allison Smith DPM      Consent   Consent obtained? verbal  Consent given by: patient  Risks discussed? procedural risks discussed  Time out called at 11/6/2023 4:09 PM  Immediately prior to the procedure a time out was called and the performing provider verified the correct patient, procedure, equipment, support staff, and site/side marked as required.     Debridement Details  Performed by: physician  Debridement type: surgical  Level of debridement: subcutaneous tissue  Pain control: lidocaine 4%  Pain control administration type: topical    Pre-debridement measurements  Length (cm): 0.3  Width (cm): 0.4  Depth (cm): 0.2  Surface Area (cm^2): 0.12    Post-debridement measurements  Length (cm): 0.4  Width (cm): 0.5  Depth (cm): 0.2  Percent debrided: 100%  Surface Area (cm^2): 0.2  Area Debrided (cm^2): 0.2  Volume (cm^3): 0.04    Tissue and other material debrided: subcutaneous tissue  Devitalized tissue debrided: biofilm, callus, fibrin and slough  Instrument(s) utilized: blade  Bleeding: small  Hemostasis obtained with: not applicable  Procedural pain (0-10): 0  Post-procedural pain: 0   Response to treatment: procedure was tolerated well

## 2023-11-06 NOTE — PATIENT INSTRUCTIONS
For Sylvester MIRIAM Duval 1945    Date of Service 2023    -Change dressings every 2-3 days with [facility] with offloading felt pad  -Remain minimally weightbearing in [place] offloading shoe  -Contact our office with any questions/concerns  -If wound worsens and there are signs of infection, seek immediate medical attention    Follow up in 2 weeks with Dr. Ana Gil

## 2023-11-06 NOTE — TELEPHONE ENCOUNTER
Pt previous podiatry notes do not mention ingrown toenail. Pt was seen at wound clinic today for right 2nd toe.  Please further clarify,

## 2023-11-07 NOTE — PROGRESS NOTES
Weekly Wound Education Note    Teaching Provided To: Patient; Family  Training Topics: Discharge instructions;Cleasing and general instructions;Dressing  Training Method: Demonstration;Explain/Verbal;Written  Training Response: Patient responds and understands        Notes: Offloading  felted foam to right plantar foot and left bunion. secured with tape and may be left in place changing if soiled. cleanse right plantar foot with saline or wound cleanser, apply zak and hydrofera ready secured with tape, change every other day.  Betadine applied to right 2nd toe, covered with gauze  change daily

## 2023-11-07 NOTE — TELEPHONE ENCOUNTER
Becka De La Garza DPM  You13 hours ago (9:05 PM)       I saw patient today in wound care center. They did not have the proper instruments for me to do a partial nail avulsion. I advised patient to schedule to see me at the end of this week or next week to be seen in my clinic for nail avulsion.

## 2023-11-07 NOTE — TELEPHONE ENCOUNTER
Daughter returned call. Patient was scheduled for nail avulsion procedure slot on 11/28 as daughter is unable to bring patient until after 4pm. And no sooner appointment available at this time. She was added on waitlist. Please advise if appointment scheduled is appropriate. Patient has dialysis on Mon, Wed, and Fridays so unable to come those days as well. Thank you.

## 2023-11-08 NOTE — TELEPHONE ENCOUNTER
Called pt and s/w Daughter. She states pt cannot wait until 11/28 for procedure and Dr Justin Funk told pt she could get in next wk. I offered appt on 11/14 at 11am and she was unsure if that would work for the pt. I advised her that is the only opening for next wk. She accepted appt, if she needs to cancel she will call back and cancel and keep the 11/28 appt.  She had no further q's

## 2023-11-09 ENCOUNTER — APPOINTMENT (OUTPATIENT)
Dept: WOUND CARE | Facility: HOSPITAL | Age: 78
End: 2023-11-09
Attending: NURSE PRACTITIONER
Payer: MEDICARE

## 2023-11-09 NOTE — TELEPHONE ENCOUNTER
Pt's daughter called. Pt is scheduled for an appointment on 11-14-23 at 11:00 am.  Caller requesting afternoon appointment on Tuesday or Wednesday 11-15-23.   Please call

## 2023-11-10 NOTE — TELEPHONE ENCOUNTER
Called pt daughter and informed her no further openings next week. She requested that I s/w Dr Melo Wilson directly about availability because 11/14 at 11am does not work for her and Dr Melo Wilson informed them he would get the pt in next wk. I did try to explain that we do have an appt for pt next wk and apologized if it doesn't work for her. I did also offer to try to schedule procedure for another wk and she declined as she thinks pt is in a lot of pain and needs to get in. I did confirm Dr Melo Wilson was ok with this plan. Pt daughter notified and she will call back if she needs to cancel her appt.

## 2023-11-14 ENCOUNTER — OFFICE VISIT (OUTPATIENT)
Facility: LOCATION | Age: 78
End: 2023-11-14

## 2023-11-14 DIAGNOSIS — M79.672 LEFT FOOT PAIN: ICD-10-CM

## 2023-11-14 DIAGNOSIS — L97.519 ULCER OF RIGHT FOOT, UNSPECIFIED ULCER STAGE (HCC): ICD-10-CM

## 2023-11-14 DIAGNOSIS — L60.0 INGROWING NAIL, LEFT GREAT TOE: Primary | ICD-10-CM

## 2023-11-14 DIAGNOSIS — Z89.421 STATUS POST AMPUTATION OF LESSER TOE OF RIGHT FOOT (HCC): ICD-10-CM

## 2023-11-14 PROCEDURE — 11042 DBRDMT SUBQ TIS 1ST 20SQCM/<: CPT | Performed by: PODIATRIST

## 2023-11-14 PROCEDURE — 11730 AVULSION NAIL PLATE SIMPLE 1: CPT | Performed by: PODIATRIST

## 2023-11-14 NOTE — PROCEDURES
Per verbal order from Dr Ramses Soria, draw up 2.5mls of 1% lidocaine and 2.5mls of Marcaine 0.5%, for ingrown toenail procedure  left hallux bilateral border.

## 2023-11-17 NOTE — PROGRESS NOTES
[facility] Podiatry  Progress Note    Allyson Skaggs is a 66year old female. Chief Complaint   Patient presents with    Ingrown Toenail     Left hallux bilateral borders, ingrown. Patient rates pain 8/10 when bumped. HPI:     Patient is a very pleasant 75-year-old female who is presenting to clinic today with complaints of ingrowing toenails to both borders of her left great toe. Patient also has a history of an ulceration to the plantar second metatarsal head of the right foot. She is seeing me in wound care center for this. Patient's main concern today is her ingrowing toenails to both borders of her left great toe. She denies noticing any drainage, redness, or other signs of infection. She does rate her pain 8/10 when the toes bumped. Denies any other concerns today and is here for further evaluation and care. Denies recent nausea, vomiting, fever, chills. Allergies: Epinephrine   Current Outpatient Medications   Medication Sig Dispense Refill    HYDROcodone-acetaminophen 5-325 MG Oral Tab Take 1 tablet by mouth every 4 (four) hours as needed. 15 tablet 0    polyethylene glycol, PEG 3350, 17 g Oral Powd Pack Take 17 g by mouth daily. acetaminophen 325 MG Oral Tab Take 1 tablet (325 mg total) by mouth every 6 (six) hours as needed for Pain. allopurinol 300 MG Oral Tab Take 1 tablet (300 mg total) by mouth daily. aspirin 81 MG Oral Tab EC Take 1 tablet (81 mg total) by mouth daily. atorvastatin 10 MG Oral Tab Take 1 tablet (10 mg total) by mouth nightly. calcium acetate 667 MG Oral Cap Take 1 capsule (667 mg total) by mouth 3 (three) times daily with meals. cinacalcet 30 MG Oral Tab Take 1 tablet (30 mg total) by mouth daily with breakfast.      clopidogrel 75 MG Oral Tab Take 1 tablet (75 mg total) by mouth daily. fenofibrate 145 MG Oral Tab Take 1 tablet (145 mg total) by mouth daily.       imatinib 100 MG Oral Tab Take 3 tablets (300 mg total) by mouth daily. Magnesium 500 MG Oral Tab Take by mouth.      cyanocobalamine 1000 MCG Oral Tab Take 1 tablet (1,000 mcg total) by mouth daily. cholecalciferol 50 MCG (2000 UT) Oral Cap Take 1 capsule (2,000 Units total) by mouth daily. carvedilol 25 MG Oral Tab Take 6.25 mg by mouth 2 (two) times daily with meals. Per patient, nephrologist decreased to once daily. 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 ([facility] [address]     Coronary atherosclerosis     Dialysis patient ([facility] [address]     High cholesterol     History of blood transfusion     Leukemia (HCC)     Renal disorder     Right foot ulcer, with fat layer exposed ([facility] [address]       Past Surgical History:   Procedure Laterality Date    APPENDECTOMY      AV FISTULA OR GRAFT ARTERIAL Left     CABG      CATH PERCUTANEOUS  TRANSLUMINAL CORONARY ANGIOPLASTY      CHOLECYSTECTOMY      OTHER Right     big toe amputation      Family History   Problem Relation Age of Onset    Heart Disorder Father     Cancer Mother       Social History     Socioeconomic History    Marital status:    Tobacco Use    Smoking status: Former    Smokeless tobacco: Never   Vaping Use    Vaping Use: Never used   Substance and Sexual Activity    Alcohol use: Not Currently     Comment: 1 drink yearly    Drug use: No           REVIEW OF SYSTEMS:     10 point ROS completed and was negative, except for pertinent positive and negatives stated in subjective. EXAM:     GENERAL: well developed, well nourished, in no apparent distress  EXTREMITIES:  1. Integument: Ingrowing toenails noted to bilateral borders of left hallux. There is no surrounding erythema, purulent drainage, or other signs of infection. Full-thickness ulceration noted to plantar aspect of right second metatarsal head, measuring approximately 0.5 cm x 0.5 cm x 0.3 cm. Skin appears moist, warm, and supple with positive hair growth. There are no color changes.  No open lesions. No macerations. No Hyperkeratotic lesions. 2. Vascular: Dorsalis pedis 2/4 bilateral and posterior tibial pulses 2/4 bilateral, capillary refill normal.  3. Neurological: Gross sensation intact via light touch bilaterally. Normal sharp/dull sensation  4. Musculoskeletal: Pain with palpation overlying ingrown toenails to bilateral borders of left hallux. No acute deformity present. Remainder of exam deferred. 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    -Discussed etiology of patient's condition and various treatment options.  -Recommend partial nail avulsion to bilateral border of left great toe at this time. Discussed procedure in detail with patient  including benefits, risks, and recovery period. Patient is agreeable with procedure and written consent was obtained. Procedure as follows (CPT: 62159)  After prepping site with alcohol, administered local infiltrative block to left hallux consisting of 5 cc of 1:1 mixture of 0.5% Marcaine plain and 1% lidocaine plain. After sufficient anesthesia was achieved, the digit was prepped with Betadine. Next, using a hemostat, bilateral borders of the left hallux nail were freed. An English anvil was then used to cut the incurvated portion of the nail down to the nail matrix. A hemostat was once again used to remove the incurvated portion of the nail in its entirety. The sites was then copiously irrigated with saline and patted dry. The site was dressed with bacitracin, gauze, and mildly compressive Coban wrap. The patient tolerated the procedure well and there were no complications.    -All soaking and aftercare instructions were discussed with the patient.   Informational handout was dispensed.    -Patient's right foot ulceration was excisionally debrided today down to and including subcutaneous tissue utilizing a 15 blade.    -Wound packed with Chiqui and cover with Hydrofera Blue. Left patient's offloading felt pad intact today. Patient will change her dressings every 2-3 days.    -Educated patient on acute signs of infection advised patient to seek immediate medical attention if any concerns arise.      -The patient indicates understanding of these issues and agrees to the plan. Time spent reviewing pertinent information from patient's chart, reviewing any pertinent imaging, obtaining history and physical exam, and discussing and mutually agreeing on a treatment plan: 20 minutes    RTC Monday at wound care center. 2 weeks for recheck on left hallux      Brock Elias [facility]        11/16/2023    Dragon speech recognition software was used to prepare this note. Errors in word recognition may occur. Please contact me with any questions/concerns with this note.

## 2023-11-20 ENCOUNTER — OFFICE VISIT (OUTPATIENT)
Dept: WOUND CARE | Facility: HOSPITAL | Age: 78
End: 2023-11-20
Attending: NURSE PRACTITIONER
Payer: MEDICARE

## 2023-11-20 VITALS
TEMPERATURE: 98 F | DIASTOLIC BLOOD PRESSURE: 59 MMHG | SYSTOLIC BLOOD PRESSURE: 111 MMHG | RESPIRATION RATE: 18 BRPM | HEART RATE: 89 BPM

## 2023-11-20 DIAGNOSIS — I73.9 PAD (PERIPHERAL ARTERY DISEASE) (HCC): ICD-10-CM

## 2023-11-20 DIAGNOSIS — L97.516 NON-PRESSURE CHRONIC ULCER OF OTHER PART OF RIGHT FOOT WITH BONE INVOLVEMENT WITHOUT EVIDENCE OF NECROSIS (HCC): Primary | ICD-10-CM

## 2023-11-20 DIAGNOSIS — Z89.421 STATUS POST AMPUTATION OF LESSER TOE OF RIGHT FOOT (HCC): ICD-10-CM

## 2023-11-20 DIAGNOSIS — Z98.890 STATUS POST NAIL SURGERY: ICD-10-CM

## 2023-11-20 DIAGNOSIS — N18.6 ESRD (END STAGE RENAL DISEASE) (HCC): ICD-10-CM

## 2023-11-20 PROCEDURE — 11042 DBRDMT SUBQ TIS 1ST 20SQCM/<: CPT | Performed by: PODIATRIST

## 2023-11-20 NOTE — PROGRESS NOTES
Patient ID: Jake Barraza is a 66year old female. Debridement Venous Ulcer Right;Plantar Foot   Wound 11/06/23 #2 Right plantar foot Foot Right;Plantar    Performed by: Earl Denton DPM  Authorized by: Earl Denton DPM      Consent   Consent obtained? verbal  Consent given by: patient  Risks discussed? procedural risks discussed    Debridement Details  Performed by: physician  Debridement type: surgical  Level of debridement: subcutaneous tissue  Pain control: lidocaine 4%  Pain control administration type: topical    Pre-debridement measurements  Length (cm): 0.2  Width (cm): 0.2  Depth (cm): 0.2  Surface Area (cm^2): 0.04    Post-debridement measurements  Length (cm): 0.2  Width (cm): 0.3  Depth (cm): 0.3  Percent debrided: 100%  Surface Area (cm^2): 0.06  Area Debrided (cm^2): 0.06  Volume (cm^3): 0.02    Tissue and other material debrided: subcutaneous tissue  Devitalized tissue debrided: biofilm, fibrin and slough  Instrument(s) utilized: curette  Bleeding: medium  Hemostasis obtained with: pressure  Procedural pain (0-10): 3  Post-procedural pain: 0   Response to treatment: procedure was tolerated well    Debridement Burn Left Toe (Comment which one)   Wound 11/20/23 #3 Left Great toe Toe (Comment which one) Left    Performed by: Earl Denton DPM  Authorized by: Earl Denton DPM      Consent   Consent obtained? verbal  Consent given by: patient  Risks discussed?  procedural risks discussed    Debridement Details  Performed by: physician  Debridement type: surgical  Level of debridement: subcutaneous tissue  Other Pain Control: lidocaine 1% injectable 4ml  Pain control administration type: injectable and topical    Pre-debridement measurements  Length (cm): 0.8  Width (cm): 0.2  Depth (cm): 0.2  Surface Area (cm^2): 0.16    Post-debridement measurements  Length (cm): 0.8  Width (cm): 0.3  Depth (cm): 0.3  Percent debrided: 50%  Surface Area (cm^2): 0.24  Area Debrided (cm^2): 0.12  Volume (cm^3): 0.07    Tissue and other material debrided: subcutaneous tissue  Devitalized tissue debrided: biofilm and nail  Instrument(s) utilized: curette and nippers  Bleeding: small  Hemostasis obtained with: pressure  Procedural pain (0-10): 4  Post-procedural pain: 1

## 2023-11-20 NOTE — PROGRESS NOTES
Weekly Wound Education Note    Teaching Provided To: Patient  Training Topics: Cleasing and general instructions; Compression; Discharge instructions  Training Method: Explain/Verbal  Training Response: Patient responds and understands; Reinforcement needed        Notes: Pt concerned with left great toe - bacitracin, gauze, tape. Right plantar foot: zak, hydrofera transfer, gauze, tape, offloading felted ring. Betadine to right 2nd toe.

## 2023-11-21 ENCOUNTER — TELEPHONE (OUTPATIENT)
Facility: LOCATION | Age: 78
End: 2023-11-21

## 2023-11-21 NOTE — TELEPHONE ENCOUNTER
Patient calling to cancel 11/28 procedure, indicates its no longer needed since she already had procedure. Patient requesting call back at [phone] to confirm cancelled, thanks.

## 2023-11-27 ENCOUNTER — OFFICE VISIT (OUTPATIENT)
Dept: WOUND CARE | Facility: HOSPITAL | Age: 78
End: 2023-11-27
Attending: NURSE PRACTITIONER
Payer: MEDICARE

## 2023-11-27 VITALS
DIASTOLIC BLOOD PRESSURE: 79 MMHG | RESPIRATION RATE: 17 BRPM | TEMPERATURE: 98 F | HEART RATE: 73 BPM | SYSTOLIC BLOOD PRESSURE: 179 MMHG

## 2023-11-27 DIAGNOSIS — Z89.421 STATUS POST AMPUTATION OF LESSER TOE OF RIGHT FOOT (HCC): ICD-10-CM

## 2023-11-27 DIAGNOSIS — L97.516 NON-PRESSURE CHRONIC ULCER OF OTHER PART OF RIGHT FOOT WITH BONE INVOLVEMENT WITHOUT EVIDENCE OF NECROSIS (HCC): Primary | ICD-10-CM

## 2023-11-27 DIAGNOSIS — I73.9 PAD (PERIPHERAL ARTERY DISEASE) (HCC): ICD-10-CM

## 2023-11-27 DIAGNOSIS — N18.6 ESRD (END STAGE RENAL DISEASE) (HCC): ICD-10-CM

## 2023-11-27 DIAGNOSIS — Z98.890 STATUS POST NAIL SURGERY: ICD-10-CM

## 2023-11-27 DIAGNOSIS — M79.671 RIGHT FOOT PAIN: ICD-10-CM

## 2023-11-27 PROCEDURE — 15275 SKIN SUB GRAFT FACE/NK/HF/G: CPT | Performed by: PODIATRIST

## 2023-11-27 RX ORDER — HYDROCODONE BITARTRATE AND ACETAMINOPHEN 5; 325 MG/1; MG/1
1 TABLET ORAL EVERY 6 HOURS PRN
Qty: 28 TABLET | Refills: 0 | Status: SHIPPED | OUTPATIENT
Start: 2023-11-27 | End: 2023-12-04

## 2023-11-27 NOTE — PROGRESS NOTES
Weekly Wound Education Note    Teaching Provided To: Patient; Family  Training Topics: Skin substitute;Dressing;Off-loading; Discharge instructions  Training Method: Demonstration;Explain/Verbal;Written  Training Response: Patient responds and understands        Notes: Right plantar foot: kerecis applied fixated with silicone layer, Felted foam applied to periwound secured with tape. Hydrofera transfer, gauze and tape applied. Leave silicone layer and skin sub in place. May change outer dressing of hydrofera transfer and gauze every 2-3 days if soiled. Betadine, gauze and tape to left great toe. Change daily.

## 2023-11-27 NOTE — PROGRESS NOTES
Patient ID: Amara Sylvester is a 66year old female. Cellular tissue product application Venous Ulcer Right;Plantar Foot    Date/Time: 11/27/2023 3:42 PM    Performed by: Tanya Kim DPM  Authorized by: Tanya Kim DPM  Associated wounds:   Wound 11/06/23 #2 Right plantar foot Foot Right;Plantar  Consent:     Consent obtained:  Verbal    Consent given by:  Patient    Alternatives discussed:  No treatment  Anesthesia (see MAR for exact dosages): Anesthesia method:  None  Procedure details:     Location:  head/hands/feet/digits/genitalia    Product applied:  Kerecis omega3    Product lot #:  [id number]    Product expiration:  1/1/2026    Amount used (cm^2): 2    Amount wasted (cm^2):  0    Secured/Fixated: Yes      Secured/Fixated with:  Silicone layer, felted foam, transfer gauze  Post-procedure details:     Patient tolerance of procedure:   Tolerated well, no immediate complications

## 2023-11-27 NOTE — PROGRESS NOTES
Patient ID: Sarah Cee is a 66year old female. Debridement Venous Ulcer Right;Plantar Foot   Wound 11/06/23 #2 Right plantar foot Foot Right;Plantar    Performed by: Emily Polo DPM  Authorized by: Emily Polo DPM      Consent   Consent obtained? verbal  Consent given by: patient  Risks discussed?  procedural risks discussed  Time out called at 11/27/2023 3:24 PM    Debridement Details  Performed by: physician  Debridement type: surgical  Level of debridement: subcutaneous tissue  Pain control: lidocaine 4%  Pain control administration type: topical    Pre-debridement measurements  Length (cm): 0.3  Width (cm): 0.4  Depth (cm): 0.5  Surface Area (cm^2): 0.12    Post-debridement measurements  Length (cm): 0.4  Width (cm): 0.5  Depth (cm): 0.5  Percent debrided: 100%  Surface Area (cm^2): 0.2  Area Debrided (cm^2): 0.2  Volume (cm^3): 0.1    Tissue and other material debrided: subcutaneous tissue  Devitalized tissue debrided: biofilm, fibrin and necrotic debris  Instrument(s) utilized: blade  Bleeding: small  Hemostasis obtained with: not applicable  Procedural pain (0-10): 0  Post-procedural pain: 0   Response to treatment: procedure was tolerated well

## 2023-12-04 ENCOUNTER — OFFICE VISIT (OUTPATIENT)
Dept: WOUND CARE | Facility: HOSPITAL | Age: 78
End: 2023-12-04
Attending: NURSE PRACTITIONER
Payer: MEDICARE

## 2023-12-04 VITALS
RESPIRATION RATE: 18 BRPM | TEMPERATURE: 98 F | DIASTOLIC BLOOD PRESSURE: 55 MMHG | SYSTOLIC BLOOD PRESSURE: 137 MMHG | HEART RATE: 75 BPM

## 2023-12-04 DIAGNOSIS — Z98.890 STATUS POST NAIL SURGERY: ICD-10-CM

## 2023-12-04 DIAGNOSIS — I73.9 PAD (PERIPHERAL ARTERY DISEASE) (HCC): ICD-10-CM

## 2023-12-04 DIAGNOSIS — N18.6 ESRD (END STAGE RENAL DISEASE) (HCC): ICD-10-CM

## 2023-12-04 DIAGNOSIS — Z89.421 STATUS POST AMPUTATION OF LESSER TOE OF RIGHT FOOT (HCC): ICD-10-CM

## 2023-12-04 DIAGNOSIS — L97.516 NON-PRESSURE CHRONIC ULCER OF OTHER PART OF RIGHT FOOT WITH BONE INVOLVEMENT WITHOUT EVIDENCE OF NECROSIS (HCC): Primary | ICD-10-CM

## 2023-12-04 PROCEDURE — 15275 SKIN SUB GRAFT FACE/NK/HF/G: CPT | Performed by: PODIATRIST

## 2023-12-04 NOTE — PROGRESS NOTES
Patient ID: Doc Jeffrey is a 66year old female. Cellular tissue product application Venous Ulcer Right;Plantar Foot    Date/Time: 12/4/2023 3:33 PM    Performed by: Romelia Castillo DPM  Authorized by: Romelia Castillo DPM  Associated wounds:   Wound 11/06/23 #2 Right plantar foot Foot Right;Plantar  Consent:     Consent obtained:  Verbal    Consent given by:  Patient    Risks discussed:  Infection    Alternatives discussed:  No treatment  Pre-procedure details:     Preparation: Patient was prepped and draped in usual sterile fashion    Anesthesia (see MAR for exact dosages): Anesthesia method:  None  Procedure details:     Location:  head/hands/feet/digits/genitalia    Product applied:  Kerecis omega3    Product lot #:  [id number]    Product expiration:  1/1/2026    Amount used (cm^2): 2    Amount wasted (cm^2):  0    Secured/Fixated with:  Silicone, hydrofera transfer  Dressing:     Dressing applied:  Hydrofera  Post-procedure details:     Patient tolerance of procedure:   Tolerated well, no immediate complications

## 2023-12-04 NOTE — PROGRESS NOTES
Weekly Wound Education Note    Teaching Provided To: Patient; Family  Training Topics: Skin substitute; Off-loading;Dressing; Discharge instructions  Training Method: Demonstration;Explain/Verbal;Written           Notes: Right plantar foot: #2 Kericis skin sub applied, covered with silicone layer, leave in place felted foam applied to periwound secured with tape, may leave in place and change only if soiled careful not to remove silicone layer, Cover with hydrofera transfer, gauze and tape. May change outer dressing of hydrofera blue and tape only every 2-3 days.

## 2023-12-04 NOTE — PROGRESS NOTES
Patient ID: Tata Rodriguez is a 66year old female. Debridement Venous Ulcer Right;Plantar Foot   Wound 11/06/23 #2 Right plantar foot Foot Right;Plantar    Performed by: Domi Alarcon DPM  Authorized by: Domi Alarcon DPM      Consent   Consent obtained? verbal  Consent given by: patient  Risks discussed?  procedural risks discussed  Time out called at 12/4/2023 3:16 PM    Debridement Details  Performed by: physician  Debridement type: surgical  Level of debridement: subcutaneous tissue  Pain control: lidocaine 4%  Pain control administration type: topical    Pre-debridement measurements  Length (cm): 0.3  Width (cm): 0.2  Depth (cm): 0.2  Surface Area (cm^2): 0.06    Post-debridement measurements  Length (cm): 0.3  Width (cm): 0.3  Depth (cm): 0.2  Percent debrided: 100%  Surface Area (cm^2): 0.09  Area Debrided (cm^2): 0.09  Volume (cm^3): 0.02    Tissue and other material debrided: subcutaneous tissue  Devitalized tissue debrided: biofilm  Instrument(s) utilized: blade  Bleeding: small  Procedural pain (0-10): 0  Post-procedural pain: 0   Response to treatment: procedure was tolerated well

## 2023-12-11 ENCOUNTER — OFFICE VISIT (OUTPATIENT)
Dept: WOUND CARE | Facility: HOSPITAL | Age: 78
End: 2023-12-11
Attending: NURSE PRACTITIONER
Payer: MEDICARE

## 2023-12-11 VITALS
TEMPERATURE: 97 F | RESPIRATION RATE: 18 BRPM | SYSTOLIC BLOOD PRESSURE: 112 MMHG | HEART RATE: 80 BPM | DIASTOLIC BLOOD PRESSURE: 58 MMHG

## 2023-12-11 DIAGNOSIS — Z98.890 STATUS POST NAIL SURGERY: ICD-10-CM

## 2023-12-11 DIAGNOSIS — N18.6 ESRD (END STAGE RENAL DISEASE) (HCC): ICD-10-CM

## 2023-12-11 DIAGNOSIS — M79.671 RIGHT FOOT PAIN: ICD-10-CM

## 2023-12-11 DIAGNOSIS — I73.9 PAD (PERIPHERAL ARTERY DISEASE) (HCC): ICD-10-CM

## 2023-12-11 DIAGNOSIS — E08.621 DIABETIC ULCER OF OTHER PART OF RIGHT FOOT ASSOCIATED WITH DIABETES MELLITUS DUE TO UNDERLYING CONDITION, WITH FAT LAYER EXPOSED (HCC): Primary | ICD-10-CM

## 2023-12-11 DIAGNOSIS — L97.512 DIABETIC ULCER OF OTHER PART OF RIGHT FOOT ASSOCIATED WITH DIABETES MELLITUS DUE TO UNDERLYING CONDITION, WITH FAT LAYER EXPOSED (HCC): Primary | ICD-10-CM

## 2023-12-11 DIAGNOSIS — Z89.421 STATUS POST AMPUTATION OF LESSER TOE OF RIGHT FOOT (HCC): ICD-10-CM

## 2023-12-11 PROCEDURE — 15275 SKIN SUB GRAFT FACE/NK/HF/G: CPT | Performed by: PODIATRIST

## 2023-12-11 NOTE — PROGRESS NOTES
Patient ID: Tata Rodriguez is a 66year old female. Debridement Venous Ulcer Right;Plantar Foot   Wound 11/06/23 #2 Right plantar foot Foot Right;Plantar    Performed by: Domi Alarcon DPM  Authorized by: Domi Alarcon DPM      Consent   Consent obtained? verbal  Consent given by: patient  Risks discussed? procedural risks discussed  Time out called at 12/11/2023 3:36 PM  Immediately prior to the procedure a time out was called and the performing provider verified the correct patient, procedure, equipment, support staff, and site/side marked as required.     Debridement Details  Performed by: physician  Debridement type: surgical  Level of debridement: subcutaneous tissue    Pre-debridement measurements  Length (cm): 0.3  Width (cm): 0.4  Depth (cm): 0.1  Surface Area (cm^2): 0.12    Post-debridement measurements  Length (cm): 0.4  Width (cm): 0.5  Depth (cm): 0.1  Percent debrided: 100%  Surface Area (cm^2): 0.2  Area Debrided (cm^2): 0.2  Volume (cm^3): 0.02    Tissue and other material debrided: subcutaneous tissue  Devitalized tissue debrided: biofilm, callus and fibrin  Instrument(s) utilized: blade  Bleeding: small  Hemostasis obtained with: not applicable  Procedural pain (0-10): 0  Post-procedural pain: 0   Response to treatment: procedure was tolerated well

## 2023-12-11 NOTE — PROGRESS NOTES
Patient ID: Jena Cummings is a 66year old female. Cellular tissue product application Venous Ulcer Right;Plantar Foot    Date/Time: 12/11/2023 3:47 PM    Performed by: Chato Rodriguez DPM  Authorized by: Chato Rodriguez DPM  Associated wounds:   Wound 11/06/23 #2 Right plantar foot Foot Right;Plantar  Consent:     Consent obtained:  Verbal    Consent given by:  Patient    Risks discussed:  Infection    Alternatives discussed:  No treatment  Pre-procedure details:     Preparation: Patient was prepped and draped in usual sterile fashion    Anesthesia (see MAR for exact dosages): Anesthesia method:  None  Procedure details:     Location:  head/hands/feet/digits/genitalia    Product applied:  Kerecis omega3    Product lot #:  [id number]    Product expiration:  12/1/2025    Amount used (cm^2): 2    Amount wasted (cm^2):  0    Secured/Fixated with:  Silicone,  Dressing:     Dressing applied:  Hydrofera  Post-procedure details:     Patient tolerance of procedure:   Tolerated well, no immediate complications

## 2023-12-11 NOTE — PROGRESS NOTES
Weekly Wound Education Note    Teaching Provided To: Patient  Training Topics: Dressing;Skin substitute; Discharge instructions  Training Method: Demonstration;Explain/Verbal;Written  Training Response: Patient responds and understands        Notes: #3 Kerecis graft applied to right plantar foot wound cover with silicone layer, felted foam to periwound, cover with  Hydrofera Ready (writing side away from wound). secure with tape. Change outer dressing of hydrofera blue and tape as needed for increased drainage.

## 2023-12-18 ENCOUNTER — APPOINTMENT (OUTPATIENT)
Dept: WOUND CARE | Facility: HOSPITAL | Age: 78
End: 2023-12-18
Attending: NURSE PRACTITIONER
Payer: MEDICARE

## 2023-12-18 VITALS
TEMPERATURE: 98 F | HEART RATE: 74 BPM | SYSTOLIC BLOOD PRESSURE: 91 MMHG | DIASTOLIC BLOOD PRESSURE: 36 MMHG | RESPIRATION RATE: 16 BRPM

## 2023-12-18 DIAGNOSIS — M79.671 RIGHT FOOT PAIN: ICD-10-CM

## 2023-12-18 DIAGNOSIS — Z98.890 STATUS POST NAIL SURGERY: ICD-10-CM

## 2023-12-18 DIAGNOSIS — L97.516 NON-PRESSURE CHRONIC ULCER OF OTHER PART OF RIGHT FOOT WITH BONE INVOLVEMENT WITHOUT EVIDENCE OF NECROSIS (HCC): Primary | ICD-10-CM

## 2023-12-18 DIAGNOSIS — N18.6 ESRD (END STAGE RENAL DISEASE) (HCC): ICD-10-CM

## 2023-12-18 DIAGNOSIS — Z89.421 STATUS POST AMPUTATION OF LESSER TOE OF RIGHT FOOT (HCC): ICD-10-CM

## 2023-12-18 DIAGNOSIS — I73.9 PAD (PERIPHERAL ARTERY DISEASE) (HCC): ICD-10-CM

## 2023-12-18 PROCEDURE — 15275 SKIN SUB GRAFT FACE/NK/HF/G: CPT | Performed by: PODIATRIST

## 2023-12-18 NOTE — PROGRESS NOTES
Patient ID: Elaine Galaviz is a 66year old female. Debridement Diabetic Ulcer Right;Plantar Foot   Wound 11/06/23 #2 Right plantar foot Foot Right;Plantar    Performed by: Lloyd Alvarez DPM  Authorized by: Lloyd Alvarez DPM      Consent   Consent obtained? verbal  Consent given by: patient  Risks discussed? procedural risks discussed  Time out called at 12/18/2023 3:24 PM  Immediately prior to the procedure a time out was called and the performing provider verified the correct patient, procedure, equipment, support staff, and site/side marked as required. Debridement Details  Performed by: physician  Debridement type: surgical  Level of debridement: subcutaneous tissue  Pain control: none    Pre-debridement measurements  Length (cm): 0.1  Width (cm): 0.3  Depth (cm): 0.2  Surface Area (cm^2): 0.03    Post-debridement measurements  Length (cm): 0.4  Width (cm): 0.5  Depth (cm): 0.2  Percent debrided: 100%  Surface Area (cm^2): 0.2  Area Debrided (cm^2): 0.2  Volume (cm^3): 0.04    Tissue and other material debrided: subcutaneous tissue  Devitalized tissue debrided: biofilm, fibrin and slough  Instrument(s) utilized: nippers  Bleeding: small  Hemostasis obtained with: not applicable  Procedural pain (0-10): 0  Post-procedural pain: 0   Response to treatment: procedure was tolerated well    Debridement Old surgical Right   Wound 11/06/23 #1 Right 2nd toe Right    Performed by: Lloyd Alvarez DPM  Authorized by: Lloyd Alvarez DPM      Consent   Consent obtained? verbal  Consent given by: patient  Risks discussed? procedural risks discussed  Time out called at 12/18/2023 3:26 PM  Immediately prior to the procedure a time out was called and the performing provider verified the correct patient, procedure, equipment, support staff, and site/side marked as required.     Debridement Details  Performed by: physician  Debridement type: surgical  Level of debridement: subcutaneous tissue    Pre-debridement measurements  Length (cm): 0  Width (cm): 0  Depth (cm): 0  Surface Area (cm^2): 0    Post-debridement measurements  Length (cm): 0.3  Width (cm): 0.3  Depth (cm): 0.2  Percent debrided: 100%  Surface Area (cm^2): 0.09  Area Debrided (cm^2): 0.09  Volume (cm^3): 0.02    Tissue and other material debrided: subcutaneous tissue  Devitalized tissue debrided: biofilm, fibrin and slough  Instrument(s) utilized: nippers  Bleeding: small  Hemostasis obtained with: not applicable  Procedural pain (0-10): 0  Post-procedural pain: 0   Response to treatment: procedure was tolerated well

## 2023-12-18 NOTE — PROGRESS NOTES
Patient ID: Sylvester Duval is a 66year old female. Cellular tissue product application Diabetic Ulcer Right;Plantar Foot    Date/Time: 12/18/2023 3:44 PM    Performed by: Allison Smith DPM  Authorized by: Allison Smith DPM  Associated wounds:   Wound 11/06/23 #2 Right plantar foot Foot Right;Plantar  Consent:     Consent obtained:  Verbal    Consent given by:  Patient    Risks discussed:  Infection    Alternatives discussed:  No treatment  Pre-procedure details:     Preparation: Patient was prepped and draped in usual sterile fashion    Anesthesia (see MAR for exact dosages): Anesthesia method:  None  Procedure details:     Location:  head/hands/feet/digits/genitalia    Product applied:  Kerecis omega3    Product lot #:  [id number]    Product expiration:  1/1/2026    Amount used (cm^2): 2    Secured/Fixated: Yes      Secured/Fixated with:  Silicone  Post-procedure details:     Patient tolerance of procedure: Tolerated well, no immediate complications  Cellular tissue product application Old surgical Right    Date/Time: 12/18/2023 3:46 PM    Performed by: Allison Smith DPM  Authorized by: Allison Smith DPM  Associated wounds:   Wound 11/06/23 #1 Right 2nd toe Right  Consent:     Consent obtained:  Verbal    Consent given by:  Patient    Risks discussed:  Infection    Alternatives discussed:  No treatment  Pre-procedure details:     Preparation: Patient was prepped and draped in usual sterile fashion    Anesthesia (see MAR for exact dosages): Anesthesia method:  None  Procedure details:     Location:  head/hands/feet/digits/genitalia    Product applied:  Kerecis omega3    Product lot #:  [id number]    Product expiration:  1/1/2026    Amount used (cm^2): 2    Amount wasted (cm^2):  0    Secured/Fixated: Yes      Secured/Fixated with:  Silicone  Post-procedure details:     Patient tolerance of procedure:   Tolerated well, no immediate complications

## 2023-12-19 NOTE — PROGRESS NOTES
Weekly Wound Education Note    Teaching Provided To: Patient  Training Topics: Discharge instructions;Dressing;Skin substitute  Training Method: Demonstration;Explain/Verbal;Written  Training Response: Patient responds and understands        Notes: Kerecis skin sub applied to right plantar foot and rt 2nd toe, fixated with silicone layer, hydrofera transfer, felted foam to periwound. May change outer layer only every 2-3 dayx, do not remove silicone layer or Kerecis.

## 2023-12-28 ENCOUNTER — APPOINTMENT (OUTPATIENT)
Dept: WOUND CARE | Facility: HOSPITAL | Age: 78
End: 2023-12-28
Attending: NURSE PRACTITIONER
Payer: MEDICARE

## 2023-12-28 VITALS
DIASTOLIC BLOOD PRESSURE: 52 MMHG | HEART RATE: 73 BPM | RESPIRATION RATE: 16 BRPM | SYSTOLIC BLOOD PRESSURE: 90 MMHG | TEMPERATURE: 98 F

## 2023-12-28 DIAGNOSIS — L97.516 NON-PRESSURE CHRONIC ULCER OF OTHER PART OF RIGHT FOOT WITH BONE INVOLVEMENT WITHOUT EVIDENCE OF NECROSIS (HCC): Primary | ICD-10-CM

## 2023-12-28 DIAGNOSIS — Z89.421 STATUS POST AMPUTATION OF LESSER TOE OF RIGHT FOOT (HCC): ICD-10-CM

## 2023-12-28 DIAGNOSIS — M19.071 OSTEOARTHRITIS OF RIGHT FOOT, UNSPECIFIED OSTEOARTHRITIS TYPE: ICD-10-CM

## 2023-12-28 DIAGNOSIS — N18.6 ESRD (END STAGE RENAL DISEASE) (HCC): ICD-10-CM

## 2023-12-28 DIAGNOSIS — I73.9 PAD (PERIPHERAL ARTERY DISEASE) (HCC): ICD-10-CM

## 2023-12-28 PROCEDURE — 99215 OFFICE O/P EST HI 40 MIN: CPT | Performed by: NURSE PRACTITIONER

## 2023-12-28 NOTE — PROGRESS NOTES
.Weekly Wound Education Note    Teaching Provided To: Patient; Family  Training Topics: Dressing;Cleasing and general instructions; Discharge instructions; Off-loading; Foot wear  Training Method: Explain/Verbal;Written  Training Response: Patient responds and understands        Notes: Wounds stable. Leftover Kerecis (Lot # [id number], exp date 12/01/2025) applied to amp site. Both wounds dressed with hydrofera transfer, gauze and medipore tape. Will order defender boot today.

## 2023-12-28 NOTE — PATIENT INSTRUCTIONS
Please return:1 WEEK      Patient discharge and wound care instructions  Danny Hubbard  9/28/2023       You may shower with protection of the wound (ie a cast cover or similar). Changing your dressing:            leave inplace for 1 week  Wash your hands with soap and water. Remove old dressing, discard into plastic bag and place into trash. Cleanse the wound with Normal Saline or specified wound cleanser prior to applying a clean dressing using gauze sponges, not tissues or cotton balls. Pat dry using gauze sponges, not tissue or cotton balls. Bleeding is ok. APPLY Dressing: dorsal: keracis (left over)>transfer     Plantar: transfer    Offloading  We have provided you with an off-loading shoe and insert. Please wear this shoe at all times as instructed to you. If the shoe is on your right foot do not drive with this shoe on. Will order Defender boot    Nutrition and blood sugar control:  Focus on the following:  Protein: Meats, beans, eggs, milk and yogurt particularly [place] yogurt), tofu, soy nuts, soy protein products (Follow the protein handout in your welcome folder)  Vitamin C: Citrus fruits and juices, strawberries, tomatoes, tomato juice, peppers, baked potatoes, spinach, broccoli, cauliflower, [place] sprouts, cabbage  Vitamin A: Dark green, leafy vegetables, orange or yellow vegetables, cantaloupe, fortified dairy products, liver, fortified cereals  Zinc: Fortified cereals, red meats, seafood  Consider supplementing with Noble by [employer] OR DPP dipeptic power by ndlabs.com. both products can be purchased on [employer] (These are essential branch chain amino acids that help with tissue building and wound healing). When your blood sugar is consistently elevated greater than 180 your body can't heal or fight infection.      Concerns:  Signs of infection may include the following:  Increase in redness  Red \"streaks\" from wound  Increase in swelling  Fever  Unusual odor  Change in the amount of wound drainage     Should you experience any significant changes in your wound(s) or have any questions regarding your home care instructions please contact the wound center [facility] @ [phone] If after regular business hours, please call your family doctor or local emergency room. The treatment plan has been discussed at length between you and your provider. Follow all instructions carefully, it is very important. If you do not follow all instructions you are at risk of your wound not healing, infection, possible loss of limb and even loss of life.

## 2023-12-29 ENCOUNTER — APPOINTMENT (OUTPATIENT)
Dept: WOUND CARE | Facility: HOSPITAL | Age: 78
End: 2023-12-29
Attending: NURSE PRACTITIONER
Payer: MEDICARE

## 2024-01-01 ENCOUNTER — APPOINTMENT (OUTPATIENT)
Dept: WOUND CARE | Facility: HOSPITAL | Age: 79
End: 2024-01-01
Attending: NURSE PRACTITIONER
Payer: MEDICARE

## 2024-01-01 ENCOUNTER — TELEPHONE (OUTPATIENT)
Facility: LOCATION | Age: 79
End: 2024-01-01

## 2024-01-01 ENCOUNTER — TELEPHONE (OUTPATIENT)
Dept: WOUND CARE | Facility: HOSPITAL | Age: 79
End: 2024-01-01

## 2024-01-03 NOTE — PROGRESS NOTES
CHIEF COMPLAINT:     Chief Complaint   Patient presents with    Wound Recheck     Pt here for follow up arrives with hydrofera and gauze. No new concerns      HPI:   Information obtained from patient, family and chart  8-31-23 INITIAL 77 yo female with Pmhx of PAD, esrd on dialysis, htn, hx of gangrene of lower extremities and carotid stenosis.  Follows primarily with [facility].  Patient is known to me from an admission to wound clinic in late 2019-early 2020 she was discharged resolved in feb 2020.  Primary care with katherin bhardwaj.  Hospitalized with sepsis and covid in beginning of July.  Was dc'd to sn[facility] and states that when she was in the hospital her feet and legs swelled and areas started to open. Records/hx are limited due to getting care from [facility] and [place].  She presents without any dressings on. She states [facility] has been treating the areas with betadine.  Will need [facility] records. She is coming from getting what sounds like a tbi done at [facility] today.  She last had vascular intervention done to her lower extremities with dr. Cassidy at [facility] and states if she needs anything done she wants to go back to him.  I d/w her that I am concerned about her blood flow to both feet.  At this time all wounds are stable without drainage or s/s of infection.  We discussed keeping dry until blood flow can be established. The right 2nd digit has a dorsal and plantar wound and is edematous. Will order xray to r/o osteo. She will get an appointment with dr. Cassidy. She will f/u in 1 month-sooner if she can undergo vascular intervention sooner.    HPI PRIOR TO DECEMBER 2023 SEE INDIVUDUAL PROGRESS NOTES    12-28-23 ([place] covering for [place]). Patient had vascular intervention followed by surgical intervention by dr. Ba.  She has been seeing Dr. Ba post op and they have utilizing keracis. It appears that each week all the debrided callus quickly returns to the plantar foot.  This tells me  offloading is not adequate.  Will order patient defender boot, we discussed TCC if defender is not an option.  Today I will place keracis (left over) to the dorsal wound, I discussed that until the offloading is addressed adequately I don't want to place further keracis on plantar aspect. No s/s of infection.    1-4-24 patient returns.  There has been some issue with getting the defender boot as there was question if patient had been dc'd from [facility] (which was August).  This issue has been cleared up and process re-started to order defender boot.  Today patient is very sensitive to any touch of the foot both plantarly and dorsally, she states her pain has been more this week but she thought it was because she was doing more for the holidays. She denies systemic symptoms or malodor. She does have errythema that is dorsal that was marked for monitoring and did not fade with elevation. I d/w daughter and patient that I am very concerned given her lack of blood flow, new pain, and new errythema and I am recommending she go to ed for stat assessment with labs and xrays.  Patient has a gi appointment later today that she does not want to miss as she has been dealing with cdiff.  I discussed with them the risk for limb/life loss, they verbalize understanding but state the \"GI appointment is important\". I will order labs and xray and patient should dress with gentamicin ointment if she decides not to go to ED. Wound was cultured, although with her cdiff hx I let patient and daughter know that we will most likely need to get ID involved. We may also need to get a repeat mri dependent on xrays.  MEDICATIONS:     Current Outpatient Medications:     gentamicin 0.1 % External Ointment, Apply 1 Application topically 2 (two) times daily., Disp: 30 g, Rfl: 0    HYDROcodone-acetaminophen 5-325 MG Oral Tab, Take 1 tablet by mouth every 4 (four) hours as needed., Disp: 15 tablet, Rfl: 0    polyethylene glycol, PEG 3350, 17 g Oral Powd  Pack, Take 17 g by mouth daily., Disp: , Rfl:     acetaminophen 325 MG Oral Tab, Take 1 tablet (325 mg total) by mouth every 6 (six) hours as needed for Pain., Disp: , Rfl:     allopurinol 300 MG Oral Tab, Take 1 tablet (300 mg total) by mouth daily., Disp: , Rfl:     aspirin 81 MG Oral Tab EC, Take 1 tablet (81 mg total) by mouth daily., Disp: , Rfl:     atorvastatin 10 MG Oral Tab, Take 1 tablet (10 mg total) by mouth nightly., Disp: , Rfl:     calcium acetate 667 MG Oral Cap, Take 1 capsule (667 mg total) by mouth 3 (three) times daily with meals., Disp: , Rfl:     cinacalcet 30 MG Oral Tab, Take 1 tablet (30 mg total) by mouth daily with breakfast., Disp: , Rfl:     clopidogrel 75 MG Oral Tab, Take 1 tablet (75 mg total) by mouth daily., Disp: , Rfl:     fenofibrate 145 MG Oral Tab, Take 1 tablet (145 mg total) by mouth daily., Disp: , Rfl:     imatinib 100 MG Oral Tab, Take 3 tablets (300 mg total) by mouth daily., Disp: , Rfl:     Magnesium 500 MG Oral Tab, Take by mouth., Disp: , Rfl:     cyanocobalamine 1000 MCG Oral Tab, Take 1 tablet (1,000 mcg total) by mouth daily., Disp: , Rfl:     cholecalciferol 50 MCG (2000 UT) Oral Cap, Take 1 capsule (2,000 Units total) by mouth daily., Disp: , Rfl:     carvedilol 25 MG Oral Tab, Take 6.25 mg by mouth 2 (two) times daily with meals. Per patient, nephrologist decreased to once daily., Disp: , Rfl:   ALLERGIES:     Allergies   Allergen Reactions    Epinephrine OTHER (SEE COMMENTS)     Dental epi. Pain for 2 weeks after dental procedure.      REVIEW OF SYSTEMS:   This information was obtained from the patient/family and chart.    See HPI for pertinent positives, otherwise 10 pt ROS negative.    HISTORY:   Past medical, surgical, family and social history updated where appropriate.    PHYSICAL EXAM:     Vitals:    01/04/24 0700   BP: 93/43   Pulse: 61   Resp: 16   Temp: 97.6 °F (36.4 °C)       Estimated body mass index is 28.12 kg/m² as calculated from the following:     Height as of 9/28/23: 60\".    Weight as of 10/4/23: 144 lb (65.3 kg).   No results found for: \"PGLU\"    Vital signs reviewed.Appears stated age, well groomed.    Constitutional:  Bp low, wnl for patient. Pulse Regular and wnl for patient. Respirations easy and unlabored. Temperature wnl. Elevated bmi. Appearance neat and clean. Appears in no acute distress. Well nourished and well developed.    Lower extremity:  dp weakly right. Right lower extremity free of varicosities, stable edema, + errythema on dorsal aspect that does not fade with elevation-marked for monitoring. Capillary refill < 3 seconds. Digits are warm  toenails are wnl for color, thickness and hygeine. Right hallux and 2nd amputated. Skin hydration wnl. no hairgrowth on legs.    Musculoskeletal:  Patient is in wheelchair propelled by others, able to transfer with assist  Integumentary:  refer to wound characteristics and images   Psychiatric:  Judgment and insight intact. Alert and oriented times 3. No evidence of depression, anxiety, or agitation. Calm, cooperative, and communicative.   EDEMA:   Calf     Point of Measurement - Right Calf: 32        Right Calf from:: Heel  Right Calf cm:: 28.6  Ankle     Point of Measurement - Right Ankle: 10           Right Ankle from:: Heel  Right Ankle cm:: 20.6     DIAGNOSTICS:     Lab Results   Component Value Date    BUN 24 (H) 10/04/2023    CREATSERUM 5.74 (H) 10/04/2023    ALB 2.5 (L) 09/28/2023    TP 6.0 (L) 09/28/2023 11-4-23 mri right foot  CONCLUSION:    1. Postsurgical changes within the 1st and 2nd digit as described above.   2. No marrow edema identified to suggest osteomyelitis.   3. Mild edema and noted within the subcutaneous fat   10-5-23 pathology  10-A.  Right second digit clearance fragment:  -Benign bone, cartilage, and dense connective tissue, negative for osteomyelitis or acute inflammation.     B.  Right second digit, amputation:  -Deep chronic ulcer, with exposed phalangeal bone, and  osteomyelitis.  -Proximal bone and soft tissue appear negative for osteomyelitis/acute inflammation.  1-23 CTA with run off  CONCLUSION:    1. Extensive diffuse atherosclerotic calcification.  The degree of calcification limits evaluation by CT angiography, with nondiagnostic visualization of the infrapopliteal runoff vessels.  Consider catheter angiography.   2. The extent of calcification of the superficial femoral and popliteal arteries is difficult to differentiate from vascular stents and clinical correlation is recommended.   3. No significant iliac artery stenosis.   4. Critical stenosis of proximal left renal artery and moderate stenosis of proximal right renal artery.   5. Moderate stenosis of distal right superficial femoral artery and diffuse narrowing of right popliteal artery.   6. Probable diffuse high-grade narrowing of heavily calcified right profunda femoral artery.   7. Focal stenoses estimated at 70% of the distal left superficial femoral artery and proximal and distal right popliteal artery.   8. 8 mm right middle lobe nodule.  9. Uncomplicated colonic diverticula.       9-1-23 right foot xray  CONCLUSION:    1. Postsurgical changes involve the 1st digit.   2. Correlate clinically with ulceration.  If osteomyelitis is of high clinical concern, recommend MRI for further evaluation.     WOUND ASSESSMENT:     Wound 11/06/23 #2 Right plantar foot Foot Right;Plantar (Active)   Date First Assessed/Time First Assessed: 11/06/23 1546    Wound Number (Wound Clinic Only): #2 Right plantar foot  Primary Wound Type: Arterial Ulcer  Location: Foot  Wound Location Orientation: Right;Plantar      Assessments 11/6/2023  3:48 PM 1/4/2024  9:44 AM   Wound Image       Drainage Amount Small Scant   Drainage Description Serosanguineous Serous;Yellow   Wound Length (cm) 0.3 cm 0.2 cm   Wound Width (cm) 0.4 cm 0.4 cm   Wound Surface Area (cm^2) 0.12 cm^2 0.08 cm^2   Wound Depth (cm) 0.2 cm 0.5 cm   Wound Volume (cm^3)  0.024 cm^3 0.04 cm^3   Wound Healing % -- -67   Margins Well-defined edges Well-defined edges   Non-staged Wound Description Full thickness Full thickness   Mya-wound Assessment Edema;Callous Callous;Edema;Dry   Wound Granulation Tissue Pink;Firm;Pale Grey Pink;Firm   Wound Bed Granulation (%) 100 % 100 %   Wound Odor None None   Tunneling? No No   Undermining? No No   Sinus Tracts? No No       Inactive Orders   Date Order Priority Status Authorizing Provider   12/18/23 1544 Cellular tissue product application Diabetic Ulcer Right;Plantar Foot Routine Completed Colten Ba DPM   12/18/23 1524 Debridement Diabetic Ulcer Right;Plantar Foot Routine Completed Colten Ba DPM   12/11/23 1547 Cellular tissue product application Venous Ulcer Right;Plantar Foot Routine Completed Colten Ba DPM   12/11/23 1536 Debridement Venous Ulcer Right;Plantar Foot Routine Completed Colten Ba DPM   12/04/23 1533 Cellular tissue product application Venous Ulcer Right;Plantar Foot Routine Completed Colten Ba DPM   12/04/23 1513 Debridement Venous Ulcer Right;Plantar Foot Routine Completed Colten Ba DPM   11/27/23 1542 Cellular tissue product application Venous Ulcer Right;Plantar Foot Routine Completed Colten Ba DPM   11/27/23 1523 Debridement Venous Ulcer Right;Plantar Foot Routine Completed Colten Ba DPM   11/20/23 1649 Debridement Venous Ulcer Right;Plantar Foot Routine Completed Colten Ba DPM   11/06/23 1608 Debridement Venous Ulcer Right;Plantar Foot Routine Completed Colten Ba DPM       Wound 11/06/23 #1 Right 2nd toe Right (Active)   Date First Assessed/Time First Assessed: 11/06/23 1546    Wound Number (Wound Clinic Only): #1 Right 2nd toe  Primary Wound Type: (c) Old surgical  Wound Location Orientation: Right      Assessments 11/6/2023  3:48 PM 1/4/2024  9:45 AM   Wound Image       Drainage Amount None Small   Drainage Description -- Serous;Yellow   Wound Length (cm) 0.9 cm  0.7 cm   Wound Width (cm) 0.4 cm 0.4 cm   Wound Surface Area (cm^2) 0.36 cm^2 0.28 cm^2   Wound Depth (cm) 0 cm 0.3 cm   Wound Volume (cm^3) 0 cm^3 0.084 cm^3   Margins Well-defined edges Well-defined edges   Non-staged Wound Description Full thickness Full thickness   Mya-wound Assessment Edema;Dry Edema;Dry;Blanchable erythema   Wound Bed Slough (%) -- 100 %   Wound Odor None None   Shape 100% Scab --   Tunneling? No --   Undermining? No --   Sinus Tracts? No --       Inactive Orders   Date Order Priority Status Authorizing Provider   12/18/23 1546 Cellular tissue product application Old surgical Right Routine Completed Colten Ba DPM   12/18/23 1526 Debridement Old surgical Right Routine Completed Colten Ba DPM     ASSESSMENT AND PLAN:      1. 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 Status post amputation of lesser toe of right foot (formerly [facility])  - Aerobic Bacterial Culture    3. ESRD (end stage renal disease) (formerly [facility])    4. PAD (peripheral artery disease) (formerly [facility])  - CBC W Differential W Platelet; Future  - Sed Rate, Westergren (Automated); Future  - C-Reactive Protein; Future  - Aerobic Bacterial Culture  - XR FOOT WEIGHTBEARING (3 VIEWS), RIGHT   (CPT=73630); Future             Risks, benefits, and alternatives of current treatment plan discussed in detail.  Questions and concerns addressed. Red flags to RTC or ED reviewed.  Patient (or parent) agrees to plan.      NOTE TO PATIENT: The 21st Century Cures Act makes clinical notes like these available to patients in the interest of transparency. Clinical notes are medical documents used by physicians and care providers to communicate with each other. These documents include medical  language and terminology, abbreviations, and treatment information that may sound technical and at times possibly unfamiliar. In addition, at times, the verbiage may appear blunt or direct. These documents are one tool providers use to communicate relevant information and clinical opinions of the care providers in a way that allows common understanding of the clinical context.   I spent  40 minutes with the patient. This time included:    preparing to see the patient (eg, review notes and recent diagnostics),  seeing the patient, obtaining and/or reviewing separately obtained history, performing a medically appropriate examination and/or evaluation, counseling and educating the patient, documenting in the record. Diagnostics, labs, rx  DISCHARGE:      Patient Instructions   Recommend patient go to ED for evaluation for admission with consults to ID and Dr. Ba (podiatry) and vascular as needed.    Follow-up with Dr. Ba Monday January 15 or as recommended     Things to do:  Laboratory (blood draw) orders:  Orders Placed This Encounter   Procedures    CBC W Differential W Platelet    Sed Rate, Westergren (Automated)    C-Reactive Protein    Aerobic Bacterial Culture       Meds & Refills for this Visit:  Requested Prescriptions     Signed Prescriptions Disp Refills    gentamicin 0.1 % External Ointment 30 g 0     Sig: Apply 1 Application topically 2 (two) times daily.       Imaging & Consults:  XR FOOT WEIGHTBEARING (3 VIEWS), RIGHT   (CPT=73630)    Patient discharge and wound care instructions  Maggie Valenzuelane Prabhu  1/4/2024          You may shower with protection of the wound (ie a cast cover or similar).     Changing your dressing:           change 2-3 x a day  Wash your hands with soap and water.   Remove old dressing, discard into plastic bag and place into trash.   Cleanse the wound with Normal Saline or specified wound cleanser prior to applying a clean dressing using gauze sponges, not tissues or cotton  balls.   Pat dry using gauze sponges, not tissue or cotton balls. Bleeding is ok.  APPLY Dressing: apply gentamicin and cover with gauze    Offloading  We have provided you with an off-loading shoe and insert. Please wear this shoe at all times as instructed to you. If the shoe is on your right foot do not drive with this shoe on.     Will order Defender boot    Nutrition and blood sugar control:  Focus on the following:  Protein: Meats, beans, eggs, milk and yogurt particularly Greek yogurt), tofu, soy nuts, soy protein products (Follow the protein handout in your welcome folder)  Vitamin C: Citrus fruits and juices, strawberries, tomatoes, tomato juice, peppers, baked potatoes, spinach, broccoli, cauliflower, [place] sprouts, cabbage  Vitamin A: Dark green, leafy vegetables, orange or yellow vegetables, cantaloupe, fortified dairy products, liver, fortified cereals  Zinc: Fortified cereals, red meats, seafood  Consider supplementing with Noble by [employer] OR DPP dipeptic power by ndlabs.com. both products can be purchased on amazon (These are essential branch chain amino acids that help with tissue building and wound healing).   When your blood sugar is consistently elevated greater than 180 your body can't heal or fight infection.     Concerns:  Signs of infection may include the following:  Increase in redness  Red \"streaks\" from wound  Increase in swelling  Fever  Unusual odor  Change in the amount of wound drainage     Should you experience any significant changes in your wound(s) or have any questions regarding your home care instructions please contact the [facility] @ [phone] If after regular business hours, please call your family doctor or local emergency room. The treatment plan has been discussed at length between you and your provider. Follow all instructions carefully, it is very important. If you do not follow all instructions you are at risk of your wound not healing,  infection, possible loss of limb and even loss of life.    Saniya Chavira FNP-C, CWCN-AP, CFCN, CSWS, WCC, DWC  1/4/2024

## 2024-01-04 ENCOUNTER — HOSPITAL ENCOUNTER (OUTPATIENT)
Dept: GENERAL RADIOLOGY | Facility: HOSPITAL | Age: 79
Discharge: HOME | End: 2024-01-04
Attending: NURSE PRACTITIONER
Payer: MEDICARE

## 2024-01-04 ENCOUNTER — OFFICE VISIT (OUTPATIENT)
Dept: WOUND CARE | Facility: HOSPITAL | Age: 79
End: 2024-01-04
Attending: NURSE PRACTITIONER
Payer: MEDICARE

## 2024-01-04 ENCOUNTER — TELEPHONE (OUTPATIENT)
Dept: SURGERY | Facility: HOSPITAL | Age: 79
End: 2024-01-04

## 2024-01-04 ENCOUNTER — HOSPITAL ENCOUNTER (OUTPATIENT)
Dept: LAB | Facility: HOSPITAL | Age: 79
Discharge: HOME | End: 2024-01-04
Attending: NURSE PRACTITIONER
Payer: MEDICARE

## 2024-01-04 VITALS
TEMPERATURE: 98 F | RESPIRATION RATE: 16 BRPM | HEART RATE: 61 BPM | SYSTOLIC BLOOD PRESSURE: 93 MMHG | DIASTOLIC BLOOD PRESSURE: 43 MMHG

## 2024-01-04 DIAGNOSIS — I73.9 PAD (PERIPHERAL ARTERY DISEASE) (HCC): ICD-10-CM

## 2024-01-04 DIAGNOSIS — L97.516 NON-PRESSURE CHRONIC ULCER OF OTHER PART OF RIGHT FOOT WITH BONE INVOLVEMENT WITHOUT EVIDENCE OF NECROSIS (HCC): ICD-10-CM

## 2024-01-04 DIAGNOSIS — Z89.421 STATUS POST AMPUTATION OF LESSER TOE OF RIGHT FOOT (HCC): ICD-10-CM

## 2024-01-04 DIAGNOSIS — L97.516 NON-PRESSURE CHRONIC ULCER OF OTHER PART OF RIGHT FOOT WITH BONE INVOLVEMENT WITHOUT EVIDENCE OF NECROSIS (HCC): Primary | ICD-10-CM

## 2024-01-04 DIAGNOSIS — N18.6 ESRD (END STAGE RENAL DISEASE) (HCC): ICD-10-CM

## 2024-01-04 LAB
BASOPHILS # BLD AUTO: 0.05 X10(3) UL (ref 0–0.2)
BASOPHILS NFR BLD AUTO: 0.8 %
CRP SERPL-MCNC: 2.55 MG/DL (ref ?–0.3)
EOSINOPHIL # BLD AUTO: 0.23 X10(3) UL (ref 0–0.7)
EOSINOPHIL NFR BLD AUTO: 3.8 %
ERYTHROCYTE [DISTWIDTH] IN BLOOD BY AUTOMATED COUNT: 17.2 %
ERYTHROCYTE [SEDIMENTATION RATE] IN BLOOD: 9 MM/HR
HCT VFR BLD AUTO: 33 %
HGB BLD-MCNC: 11.1 G/DL
IMM GRANULOCYTES # BLD AUTO: 0.03 X10(3) UL (ref 0–1)
IMM GRANULOCYTES NFR BLD: 0.5 %
LYMPHOCYTES # BLD AUTO: 1.13 X10(3) UL (ref 1–4)
LYMPHOCYTES NFR BLD AUTO: 18.8 %
MCH RBC QN AUTO: 37.6 PG (ref 26–34)
MCHC RBC AUTO-ENTMCNC: 33.6 G/DL (ref 31–37)
MCV RBC AUTO: 111.9 FL
MONOCYTES # BLD AUTO: 1.22 X10(3) UL (ref 0.1–1)
MONOCYTES NFR BLD AUTO: 20.3 %
NEUTROPHILS # BLD AUTO: 3.34 X10 (3) UL (ref 1.5–7.7)
NEUTROPHILS # BLD AUTO: 3.34 X10(3) UL (ref 1.5–7.7)
NEUTROPHILS NFR BLD AUTO: 55.8 %
PLATELET # BLD AUTO: 151 10(3)UL (ref 150–450)
RBC # BLD AUTO: 2.95 X10(6)UL
WBC # BLD AUTO: 6 X10(3) UL (ref 4–11)

## 2024-01-04 PROCEDURE — 85025 COMPLETE CBC W/AUTO DIFF WBC: CPT

## 2024-01-04 PROCEDURE — 85652 RBC SED RATE AUTOMATED: CPT

## 2024-01-04 PROCEDURE — 86140 C-REACTIVE PROTEIN: CPT

## 2024-01-04 PROCEDURE — 73630 X-RAY EXAM OF FOOT: CPT | Performed by: NURSE PRACTITIONER

## 2024-01-04 PROCEDURE — 99215 OFFICE O/P EST HI 40 MIN: CPT | Performed by: NURSE PRACTITIONER

## 2024-01-04 RX ORDER — GENTAMICIN SULFATE 1 MG/G
1 OINTMENT TOPICAL 2 TIMES DAILY
Qty: 30 G | Refills: 0 | Status: SHIPPED | OUTPATIENT
Start: 2024-01-04

## 2024-01-04 NOTE — TELEPHONE ENCOUNTER
ASSESSMENT AND PLAN:   Maggie Pelletier is a 78 year old female with diarrhea.  Patient with recurrent C dif symptoms for 6 months.  Hx C dif in the past. Relapsing now 6 months Vanco Rx x 3.  Still with diarrhea.  Has chronic foot ulcer may need Abx again.  Hx ESRD.  Will re treat with Flagyl and plan Dificid when available      Flagyl 500 TID x 3 weeks.  OK to begin Abx for foot ulcer if needed.  Rx sent for Dificid 200 mg BID x 10 days.  Start when okd by insurance and stop Flagyl.

## 2024-01-04 NOTE — PROGRESS NOTES
Left VM on identifiable VM for pt's daughter explaining lab and xray results and provider recommendations. Provided clinic phone number to reach out with any questions or concerns.

## 2024-01-04 NOTE — PATIENT INSTRUCTIONS
Recommend patient go to ED for evaluation for admission with consults to ID and Dr. Ba (podiatry) and vascular as needed.    Follow-up with Dr. Ba Monday January 15 or as recommended     Things to do:  Laboratory (blood draw) orders:  Orders Placed This Encounter   Procedures    CBC W Differential W Platelet    Sed Rate, Westergren (Automated)    C-Reactive Protein    Aerobic Bacterial Culture       Meds & Refills for this Visit:  Requested Prescriptions     Signed Prescriptions Disp Refills    gentamicin 0.1 % External Ointment 30 g 0     Sig: Apply 1 Application topically 2 (two) times daily.       Imaging & Consults:  XR FOOT WEIGHTBEARING (3 VIEWS), RIGHT   (CPT=73630)    Patient discharge and wound care instructions  Maggie Pelletier  1/4/2024          You may shower with protection of the wound (ie a cast cover or similar).     Changing your dressing:           change 2-3 x a day  Wash your hands with soap and water.   Remove old dressing, discard into plastic bag and place into trash.   Cleanse the wound with Normal Saline or specified wound cleanser prior to applying a clean dressing using gauze sponges, not tissues or cotton balls.   Pat dry using gauze sponges, not tissue or cotton balls. Bleeding is ok.  APPLY Dressing: apply gentamicin and cover with gauze    Offloading  We have provided you with an off-loading shoe and insert. Please wear this shoe at all times as instructed to you. If the shoe is on your right foot do not drive with this shoe on.     Will order Defender boot    Nutrition and blood sugar control:  Focus on the following:  Protein: Meats, beans, eggs, milk and yogurt particularly Greek yogurt), tofu, soy nuts, soy protein products (Follow the protein handout in your welcome folder)  Vitamin C: Citrus fruits and juices, strawberries, tomatoes, tomato juice, peppers, baked potatoes, spinach, broccoli, cauliflower, [place] sprouts, cabbage  Vitamin A: Dark green, leafy vegetables,  orange or yellow vegetables, cantaloupe, fortified dairy products, liver, fortified cereals  Zinc: Fortified cereals, red meats, seafood  Consider supplementing with [employer] by [employer] OR DPP dipeptic power by [employer]. both products can be purchased on amazon (These are essential branch chain amino acids that help with tissue building and wound healing).   When your blood sugar is consistently elevated greater than 180 your body can't heal or fight infection.     Concerns:  Signs of infection may include the following:  Increase in redness  Red \"streaks\" from wound  Increase in swelling  Fever  Unusual odor  Change in the amount of wound drainage     Should you experience any significant changes in your wound(s) or have any questions regarding your home care instructions please contact the [facility] center [facility] @ [phone] If after regular business hours, please call your family doctor or local emergency room. The treatment plan has been discussed at length between you and your provider. Follow all instructions carefully, it is very important. If you do not follow all instructions you are at risk of your wound not healing, infection, possible loss of limb and even loss of life.

## 2024-01-04 NOTE — PROGRESS NOTES
.Weekly Wound Education Note    Teaching Provided To: Patient;Family  Training Topics: Dressing;Cleasing and general instructions;Discharge instructions  Training Method: Explain/Verbal;Written  Training Response: Patient responds and understands        Notes: Wounds not improving. Pt is having increased pain and redness to foot the does not go away with elevation. Provider recommend pt go to ER for eval. Pt stated that she wants to go to her GI appointment at 1 oclock and will go to ER after. Provider ordered labs, xray, and culture this visit. Start gentamicin and bandaid, bacitracin used in clinic.

## 2024-01-07 NOTE — PROGRESS NOTES
Please let patient know that she should continue the gentamicin 2-3 x a day to both wounds.  The bacteria that grew is typically skin melchor.  Find out if her pain has improved and if redness has improved with the topical gent.  If not let me know and I will send an oral abx. Please verify she has a follow-up appointment at clinic with dr miles or myself

## 2024-01-08 ENCOUNTER — APPOINTMENT (OUTPATIENT)
Dept: WOUND CARE | Facility: HOSPITAL | Age: 79
End: 2024-01-08
Attending: NURSE PRACTITIONER
Payer: MEDICARE

## 2024-01-08 RX ORDER — CEFDINIR 300 MG/1
300 CAPSULE ORAL
Qty: 5 CAPSULE | Refills: 0 | Status: SHIPPED | OUTPATIENT
Start: 2024-01-08 | End: 2024-01-18

## 2024-01-08 NOTE — PROGRESS NOTES
Since patient still has pain and redness to her foot, please have her start po abx.  Due to her renal disease, she should take 1 capsule every 48 hours.  She should take the capsule AFTER dialysis treatment.  She also should not take her magnesium when she is taking the oral abx.

## 2024-01-08 NOTE — PROGRESS NOTES
Instructed daughter that oral antibiotic sent to pharmacy and instructions for taking medication.  Daughter stated they will  the medicine but will have to start it on Wednesday after dialysis.

## 2024-01-08 NOTE — PROGRESS NOTES
Spoke to patients daughter, reviewed culture results.  She stated they are still applying Gentamicin ointment 2-3 daily, pain persists and so does redness.  Daughter also wanted to let us know that she is currently taking an antibiotic for CDiff.  Follow up appointment here is on Thursday.  Informed daughter that provider will be notified and may be called with an oral antibiotic.

## 2024-01-10 NOTE — PROGRESS NOTES
CHIEF COMPLAINT:     Chief Complaint   Patient presents with    Wound Recheck     Pt here for follow up arrives with Defender boot to right foot, she reports discomfort to leg and hip when wearing boot     HPI:   Information obtained from patient, family and chart  8-31-23 INITIAL 77 yo female with Pmhx of PAD, esrd on dialysis, htn, hx of gangrene of lower extremities and carotid stenosis.  Follows primarily with [facility].  Patient is known to me from an admission to wound clinic in late 2019-early 2020 she was discharged resolved in feb 2020.  Primary care with katherin bhardwaj.  Hospitalized with sepsis and covid in beginning of July.  Was dc'd to snf and states that when she was in the hospital her feet and legs swelled and areas started to open. Records/hx are limited due to getting care from [facility] and [facility].  She presents without any dressings on. She states [facility] has been treating the areas with betadine.  Will need [facility] records. She is coming from getting what sounds like a tbi done at [facility] today.  She last had vascular intervention done to her lower extremities with dr. Cassidy at [facility] and states if she needs anything done she wants to go back to him.  I d/w her that I am concerned about her blood flow to both feet.  At this time all wounds are stable without drainage or s/s of infection.  We discussed keeping dry until blood flow can be established. The right 2nd digit has a dorsal and plantar wound and is edematous. Will order xray to r/o osteo. She will get an appointment with dr. Cassidy. She will f/u in 1 month-sooner if she can undergo vascular intervention sooner.    HPI PRIOR TO DECEMBER 2023 SEE INDIVUDUAL PROGRESS NOTES    12-28-23 ([facility] covering for [place]). Patient had vascular intervention followed by surgical intervention by dr. Ba.  She has been seeing Dr. Ba post op and they have utilizing keracis. It appears that each week all the debrided callus  quickly returns to the plantar foot.  This tells me offloading is not adequate.  Will order patient defender boot, we discussed TCC if defender is not an option.  Today I will place keracis (left over) to the dorsal wound, I discussed that until the offloading is addressed adequately I don't want to place further keracis on plantar aspect. No s/s of infection.    1-4-24 patient returns.  There has been some issue with getting the defender boot as there was question if patient had been dc'd from [facility] (which was August).  This issue has been cleared up and process re-started to order defender boot.  Today patient is very sensitive to any touch of the foot both plantarly and dorsally, she states her pain has been more this week but she thought it was because she was doing more for the holidays. She denies systemic symptoms or malodor. She does have errythema that is dorsal that was marked for monitoring and did not fade with elevation. I d/w daughter and patient that I am very concerned given her lack of blood flow, new pain, and new errythema and I am recommending she go to ed for stat assessment with labs and xrays.  Patient has a gi appointment later today that she does not want to miss as she has been dealing with cdiff.  I discussed with them the risk for limb/life loss, they verbalize understanding but state the \"GI appointment is important\". I will order labs and xray and patient should dress with gentamicin ointment if she decides not to go to ED. Wound was cultured, although with her cdiff hx I let patient and daughter know that we will most likely need to get ID involved. We may also need to get a repeat mri dependent on xrays.    1-11-24 patient returns.  She did get her xray which was negative for changes from November and no evidence of osteo. She also got her labs drawn which she did not have a white count. Her crp remain elevated.  She saw GI and they sent a note stating that she would be on flagyl 500tid  x 3 weeks and will transition to dificid once ok'd by her insurance.  Her culture was + for staph lugdenisis.  I originally had her on topical, but per daughter the redness and pain were not improving, so I also did start her on cefdnir po. Finally she did get her defender boot a couple days ago.  Kercis, epicord, theraskin are covered. Patienti s c/o \"uneven ness\" of the defender and her shoe. We discussed the \"even up\" device for her other shoe.  Her only other option would be a tcc.  She has an arterial study scheduled for next week. She is much less tender than last week, the surrounding tissue is pink in color. Will debride today and place keracis.  MEDICATIONS:     Current Outpatient Medications:     cefdinir 300 MG Oral Cap, Take 1 capsule (300 mg total) by mouth every other day for 10 days. Take capsule after dialysis treatment, Disp: 5 capsule, Rfl: 0    gentamicin 0.1 % External Ointment, Apply 1 Application topically 2 (two) times daily., Disp: 30 g, Rfl: 0    HYDROcodone-acetaminophen 5-325 MG Oral Tab, Take 1 tablet by mouth every 4 (four) hours as needed., Disp: 15 tablet, Rfl: 0    polyethylene glycol, PEG 3350, 17 g Oral Powd Pack, Take 17 g by mouth daily., Disp: , Rfl:     acetaminophen 325 MG Oral Tab, Take 1 tablet (325 mg total) by mouth every 6 (six) hours as needed for Pain., Disp: , Rfl:     allopurinol 300 MG Oral Tab, Take 1 tablet (300 mg total) by mouth daily., Disp: , Rfl:     aspirin 81 MG Oral Tab EC, Take 1 tablet (81 mg total) by mouth daily., Disp: , Rfl:     atorvastatin 10 MG Oral Tab, Take 1 tablet (10 mg total) by mouth nightly., Disp: , Rfl:     calcium acetate 667 MG Oral Cap, Take 1 capsule (667 mg total) by mouth 3 (three) times daily with meals., Disp: , Rfl:     cinacalcet 30 MG Oral Tab, Take 1 tablet (30 mg total) by mouth daily with breakfast., Disp: , Rfl:     clopidogrel 75 MG Oral Tab, Take 1 tablet (75 mg total) by mouth daily., Disp: , Rfl:     fenofibrate 145 MG  Oral Tab, Take 1 tablet (145 mg total) by mouth daily., Disp: , Rfl:     imatinib 100 MG Oral Tab, Take 3 tablets (300 mg total) by mouth daily., Disp: , Rfl:     Magnesium 500 MG Oral Tab, Take by mouth., Disp: , Rfl:     cyanocobalamine 1000 MCG Oral Tab, Take 1 tablet (1,000 mcg total) by mouth daily., Disp: , Rfl:     cholecalciferol 50 MCG (2000 UT) Oral Cap, Take 1 capsule (2,000 Units total) by mouth daily., Disp: , Rfl:     carvedilol 25 MG Oral Tab, Take 6.25 mg by mouth 2 (two) times daily with meals. Per patient, nephrologist decreased to once daily., Disp: , Rfl:   ALLERGIES:     Allergies   Allergen Reactions    Epinephrine OTHER (SEE COMMENTS)     Dental epi. Pain for 2 weeks after dental procedure.      REVIEW OF SYSTEMS:   This information was obtained from the patient/family and chart.    See HPI for pertinent positives, otherwise 10 pt ROS negative.    HISTORY:   Past medical, surgical, family and social history updated where appropriate.    PHYSICAL EXAM:     Vitals:    01/11/24 0700   BP: 126/35   Pulse: 63   Resp: 16   Temp: 97.3 °F (36.3 °C)         Estimated body mass index is 28.12 kg/m² as calculated from the following:    Height as of 9/28/23: 60\".    Weight as of 10/4/23: 144 lb (65.3 kg).   No results found for: \"PGLU\"    Vital signs reviewed.Appears stated age, well groomed.    Constitutional:  Bp low, wnl for patient. Pulse Regular and wnl for patient. Respirations easy and unlabored. Temperature wnl. Elevated bmi. Appearance neat and clean. Appears in no acute distress. Well nourished and well developed.    Lower extremity:  dp weakly right, patient with monophasic signals per handheld doppler at the right dp/pt/dustak 3rd dugut,. Right lower extremity free of varicosities, stable edema,. Capillary refill < 3 seconds. Digits are cool.  toenails are wnl for color, thickness and hygeine. Right hallux and 2nd amputated. Skin hydration wnl. no hairgrowth on legs.    Musculoskeletal:   Patient is in wheelchair propelled by others, able to transfer with assist  Integumentary:  refer to wound characteristics and images   Psychiatric:  Judgment and insight intact. Alert and oriented times 3. No evidence of depression, anxiety, or agitation. Calm, cooperative, and communicative.   EDEMA:   Calf     Point of Measurement - Right Calf: 32        Right Calf from:: Heel  Right Calf cm:: 29  Ankle                 Right Ankle from:: Heel        DIAGNOSTICS:     Lab Results   Component Value Date    BUN 24 (H) 10/04/2023    CREATSERUM 5.74 (H) 10/04/2023    ALB 2.5 (L) 09/28/2023    TP 6.0 (L) 09/28/2023     Lab Results   Component Value Date    ESRML 9 01/04/2024    ESRML 1 09/28/2023      Lab Results   Component Value Date    CRP 2.55 (H) 01/04/2024    CRP 2.64 (H) 09/28/2023 1-4-24 XR right foot  FINDINGS:    Redemonstrated are postsurgical changes 1st transmetatarsal amputation and 2nd digit amputation.  There is a surgical screw within the mid 1st metatarsal, no evidence of hardware loosening or failure.  No acute fractures.  There is generalized  osteopenia.  Vascular calcifications are present.  Calcaneal Achilles and plantar enthesophytes are present.  CONCLUSION:  No significant interval changes compared to 10/5/2023.  No radiographic evidence osteomyelitis.     11-4-23 mri right foot  CONCLUSION:    1. Postsurgical changes within the 1st and 2nd digit as described above.   2. No marrow edema identified to suggest osteomyelitis.   3. Mild edema and noted within the subcutaneous fat   10-5-23 pathology  10-A.  Right second digit clearance fragment:  -Benign bone, cartilage, and dense connective tissue, negative for osteomyelitis or acute inflammation.     B.  Right second digit, amputation:  -Deep chronic ulcer, with exposed phalangeal bone, and osteomyelitis.  -Proximal bone and soft tissue appear negative for osteomyelitis/acute inflammation.  1-23 CTA with run off  CONCLUSION:    1. Extensive  diffuse atherosclerotic calcification.  The degree of calcification limits evaluation by CT angiography, with nondiagnostic visualization of the infrapopliteal runoff vessels.  Consider catheter angiography.   2. The extent of calcification of the superficial femoral and popliteal arteries is difficult to differentiate from vascular stents and clinical correlation is recommended.   3. No significant iliac artery stenosis.   4. Critical stenosis of proximal left renal artery and moderate stenosis of proximal right renal artery.   5. Moderate stenosis of distal right superficial femoral artery and diffuse narrowing of right popliteal artery.   6. Probable diffuse high-grade narrowing of heavily calcified right profunda femoral artery.   7. Focal stenoses estimated at 70% of the distal left superficial femoral artery and proximal and distal right popliteal artery.   8. 8 mm right middle lobe nodule.  9. Uncomplicated colonic diverticula.       9-1-23 right foot xray  CONCLUSION:    1. Postsurgical changes involve the 1st digit.   2. Correlate clinically with ulceration.  If osteomyelitis is of high clinical concern, recommend MRI for further evaluation.     WOUND ASSESSMENT:     Wound 11/06/23 #2 Right plantar foot Foot Right;Plantar (Active)   Date First Assessed/Time First Assessed: 11/06/23 1546    Wound Number (Wound Clinic Only): #2 Right plantar foot  Primary Wound Type: Arterial Ulcer  Location: Foot  Wound Location Orientation: Right;Plantar      Assessments 11/6/2023  3:48 PM 1/11/2024  8:15 AM   Wound Image        Drainage Amount Small Scant   Drainage Description Serosanguineous Serous;Yellow   Wound Length (cm) 0.3 cm 0.1 cm   Wound Width (cm) 0.4 cm 0.5 cm   Wound Surface Area (cm^2) 0.12 cm^2 0.05 cm^2   Wound Depth (cm) 0.2 cm 0.4 cm   Wound Volume (cm^3) 0.024 cm^3 0.02 cm^3   Wound Healing % -- 17   Margins Well-defined edges Well-defined edges   Non-staged Wound Description Full thickness Full  thickness   Mya-wound Assessment Edema;Callous Callous;Edema;Dry   Wound Granulation Tissue Pink;Firm;Pale Grey Pink;Firm   Wound Bed Granulation (%) 100 % 100 %   Wound Odor None --   Tunneling? No --   Undermining? No --   Sinus Tracts? No --       Active Orders   Date Order Priority Status Authorizing Provider   01/11/24 0845 Cellular tissue product application Routine Active Saniya Chavira APRN   01/11/24 0844 Debridement Arterial Ulcer Right;Plantar Foot Routine Active Saniya Chavira, MACY       Inactive Orders   Date Order Priority Status Authorizing Provider   12/18/23 1544 Cellular tissue product application Diabetic Ulcer Right;Plantar Foot Routine Completed Colten Ba DPM   12/18/23 1524 Debridement Diabetic Ulcer Right;Plantar Foot Routine Completed Colten Ba DPM   12/11/23 1547 Cellular tissue product application Venous Ulcer Right;Plantar Foot Routine Completed Colten Ba DPM   12/11/23 1536 Debridement Venous Ulcer Right;Plantar Foot Routine Completed Colten Ba DPM   12/04/23 1533 Cellular tissue product application Venous Ulcer Right;Plantar Foot Routine Completed Colten Ba DPM   12/04/23 1513 Debridement Venous Ulcer Right;Plantar Foot Routine Completed Colten Ba DPM   11/27/23 1542 Cellular tissue product application Venous Ulcer Right;Plantar Foot Routine Completed Colten Ba DPM   11/27/23 1523 Debridement Venous Ulcer Right;Plantar Foot Routine Completed Colten Ba DPM   11/20/23 1649 Debridement Venous Ulcer Right;Plantar Foot Routine Completed Colten Ba DPM   11/06/23 1608 Debridement Venous Ulcer Right;Plantar Foot Routine Completed Colten Ba DPM       Wound 11/06/23 #1 Right 2nd toe Right (Active)   Date First Assessed/Time First Assessed: 11/06/23 1546    Wound Number (Wound Clinic Only): #1 Right 2nd toe  Primary Wound Type: (c) Old surgical  Wound Location Orientation: Right      Assessments 11/6/2023  3:48 PM 1/11/2024  8:13 AM    Wound Image        Drainage Amount None Scant   Drainage Description -- Serous;Yellow   Wound Length (cm) 0.9 cm 0.3 cm   Wound Width (cm) 0.4 cm 0.2 cm   Wound Surface Area (cm^2) 0.36 cm^2 0.06 cm^2   Wound Depth (cm) 0 cm 0.3 cm   Wound Volume (cm^3) 0 cm^3 0.018 cm^3   Margins Well-defined edges Well-defined edges   Non-staged Wound Description Full thickness --   Mya-wound Assessment Edema;Dry Edema;Moist   Wound Granulation Tissue -- Pink;Pale Grey;Firm   Wound Bed Granulation (%) -- 100 %   Wound Odor None None   Shape 100% Scab --   Tunneling? No --   Undermining? No --   Sinus Tracts? No --       Active Orders   Date Order Priority Status Authorizing Provider   01/11/24 0845 Cellular tissue product application Routine Active Saniya Chavira, MACY   01/11/24 0844 Debridement Old surgical Right Routine Active Saniya Chavira, MACY       Inactive Orders   Date Order Priority Status Authorizing Provider   12/18/23 1546 Cellular tissue product application Old surgical Right Routine Completed Colten Ba DPM   12/18/23 1526 Debridement Old surgical Right Routine Completed Colten Ba DPM     PROCEDURE:      This procedure was medically necessary to promote wound healing by removing nonviable tissue, decrease chance of infection, and return the wound to an acute state.  See rn px note  This procedure is medically necessary to nourish the wound bed with a skin substitute containing fat, protein, elastin, glycans and other natural skin elements.  The graft recruits the body's own cells and is ultimately converted into living tissue to assist in granulation production and more rapid healing.  See rn px note      ASSESSMENT AND PLAN:      1. 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 ESRD (end stage renal disease) (HCC)    4. PAD (peripheral artery disease)  (HCC)      Risks, benefits, and alternatives of current treatment plan discussed in detail.  Questions and concerns addressed. Red flags to RTC or ED reviewed.  Patient (or parent) agrees to plan.      NOTE TO PATIENT: The 21st Century Cures Act makes clinical notes like these available to patients in the interest of transparency. Clinical notes are medical documents used by physicians and care providers to communicate with each other. These documents include medical language and terminology, abbreviations, and treatment information that may sound technical and at times possibly unfamiliar. In addition, at times, the verbiage may appear blunt or direct. These documents are one tool providers use to communicate relevant information and clinical opinions of the care providers in a way that allows common understanding of the clinical context.   I spent 35 minutes with the patient. This time included:    preparing to see the patient (eg, review notes and recent diagnostics),  seeing the patient, obtaining and/or reviewing separately obtained history, performing a medically appropriate examination and/or evaluation, counseling and educating the patient, documenting in the record. Fly sena placement only  DISCHARGE:      Patient Instructions   Please return: 1 week     Patient discharge and wound care instructions  Maggiesarah Amaya Prabhu  1/11/2024            You may shower with protection of the wound (ie a cast cover or similar).     Changing your dressing:           leave in place for 1 week  Wash your hands with soap and water.   Remove old dressing, discard into plastic bag and place into trash.   Cleanse the wound with Normal Saline or specified wound cleanser prior to applying a clean dressing using gauze sponges, not tissues or cotton balls.   Pat dry using gauze sponges, not tissue or cotton balls. Bleeding is ok.  APPLY Dressing: keracis>silicone contact layer>transfer>bordered foam>  Spandagrip  d    Offloading  Please wear this DEFENDER at all times as instructed to you. If the shoe is on your right foot do not drive with this shoe on.       Nutrition and blood sugar control:  Focus on the following:  Protein: Meats, beans, eggs, milk and yogurt particularly Greek yogurt), tofu, soy nuts, soy protein products (Follow the protein handout in your welcome folder)  Vitamin C: Citrus fruits and juices, strawberries, tomatoes, tomato juice, peppers, baked potatoes, spinach, broccoli, cauliflower, [place] sprouts, cabbage  Vitamin A: Dark green, leafy vegetables, orange or yellow vegetables, cantaloupe, fortified dairy products, liver, fortified cereals  Zinc: Fortified cereals, red meats, seafood  Consider supplementing with Noble by [employer] OR DPP dipeptic power by ndlabs.com. both products can be purchased on amazon (These are essential branch chain amino acids that help with tissue building and wound healing).   When your blood sugar is consistently elevated greater than 180 your body can't heal or fight infection.     Concerns:  Signs of infection may include the following:  Increase in redness  Red \"streaks\" from wound  Increase in swelling  Fever  Unusual odor  Change in the amount of wound drainage     Should you experience any significant changes in your wound(s) or have any questions regarding your home care instructions please contact the wound center [facility] @ [phone] If after regular business hours, please call your family doctor or local emergency room. The treatment plan has been discussed at length between you and your provider. Follow all instructions carefully, it is very important. If you do not follow all instructions you are at risk of your wound not healing, infection, possible loss of limb and even loss of life.    Saniya Chavira FNP-C, CWCN-AP, CFCN, CSWS, WCC, DWC  1/11/2024

## 2024-01-11 ENCOUNTER — OFFICE VISIT (OUTPATIENT)
Dept: WOUND CARE | Facility: HOSPITAL | Age: 79
End: 2024-01-11
Attending: NURSE PRACTITIONER
Payer: MEDICARE

## 2024-01-11 VITALS
HEART RATE: 63 BPM | TEMPERATURE: 97 F | DIASTOLIC BLOOD PRESSURE: 35 MMHG | SYSTOLIC BLOOD PRESSURE: 126 MMHG | RESPIRATION RATE: 16 BRPM

## 2024-01-11 DIAGNOSIS — Z89.421 STATUS POST AMPUTATION OF LESSER TOE OF RIGHT FOOT (HCC): ICD-10-CM

## 2024-01-11 DIAGNOSIS — N18.6 ESRD (END STAGE RENAL DISEASE) (HCC): ICD-10-CM

## 2024-01-11 DIAGNOSIS — L97.516 NON-PRESSURE CHRONIC ULCER OF OTHER PART OF RIGHT FOOT WITH BONE INVOLVEMENT WITHOUT EVIDENCE OF NECROSIS (HCC): Primary | ICD-10-CM

## 2024-01-11 DIAGNOSIS — I73.9 PAD (PERIPHERAL ARTERY DISEASE) (HCC): ICD-10-CM

## 2024-01-11 PROCEDURE — 15275 SKIN SUB GRAFT FACE/NK/HF/G: CPT | Performed by: NURSE PRACTITIONER

## 2024-01-11 NOTE — PROGRESS NOTES
.Weekly Wound Education Note    Teaching Provided To: Patient;Family  Training Topics: Dressing;Cleasing and general instructions;Discharge instructions;Compression;Edema control  Training Method: Explain/Verbal;Written  Training Response: Patient responds and understands        Notes: Wounds stable. Kerecis applied to both wounds, Fixated with silicone contact layer, dressed with hydrofera transfer, and bordered foam with spandagrip D. Pt is to continue to wear defender boot at all times.

## 2024-01-11 NOTE — PATIENT INSTRUCTIONS
Please return: 1 week     Patient discharge and wound care instructions  Maggie Pelletier  1/11/2024            You may shower with protection of the wound (ie a cast cover or similar).     Changing your dressing:           leave in place for 1 week  Wash your hands with soap and water.   Remove old dressing, discard into plastic bag and place into trash.   Cleanse the wound with Normal Saline or specified wound cleanser prior to applying a clean dressing using gauze sponges, not tissues or cotton balls.   Pat dry using gauze sponges, not tissue or cotton balls. Bleeding is ok.  APPLY Dressing: keracis>silicone contact layer>transfer>bordered foam>  Spandagrip d    Offloading  Please wear this DEFENDER at all times as instructed to you. If the shoe is on your right foot do not drive with this shoe on.       Nutrition and blood sugar control:  Focus on the following:  Protein: Meats, beans, eggs, milk and yogurt particularly Greek yogurt), tofu, soy nuts, soy protein products (Follow the protein handout in your welcome folder)  Vitamin C: Citrus fruits and juices, strawberries, tomatoes, tomato juice, peppers, baked potatoes, spinach, broccoli, cauliflower, [place] sprouts, cabbage  Vitamin A: Dark green, leafy vegetables, orange or yellow vegetables, cantaloupe, fortified dairy products, liver, fortified cereals  Zinc: Fortified cereals, red meats, seafood  Consider supplementing with Noble by eventblimp OR DPP dipeptic power by ndlabs.com. both products can be purchased on amazon (These are essential branch chain amino acids that help with tissue building and wound healing).   When your blood sugar is consistently elevated greater than 180 your body can't heal or fight infection.     Concerns:  Signs of infection may include the following:  Increase in redness  Red \"streaks\" from wound  Increase in swelling  Fever  Unusual odor  Change in the amount of wound drainage     Should you experience any significant  changes in your wound(s) or have any questions regarding your home care instructions please contact the wound center [facility] @ [phone] If after regular business hours, please call your family doctor or local emergency room. The treatment plan has been discussed at length between you and your provider. Follow all instructions carefully, it is very important. If you do not follow all instructions you are at risk of your wound not healing, infection, possible loss of limb and even loss of life.

## 2024-01-11 NOTE — PROGRESS NOTES
Patient ID: Maggie Pelletier is a 78 year old female.    Debridement Arterial Ulcer Right;Plantar Foot   Wound 11/06/23 #2 Right plantar foot Foot Right;Plantar    Performed by: Saniya Chavira APRN  Authorized by: Saniya Chavira APRN      Consent   Consent obtained? verbal  Consent given by: patient  Risks discussed? procedural risks discussed    Debridement Details  Performed by: NP  Debridement type: surgical  Level of debridement: subcutaneous tissue    Pre-debridement measurements  Length (cm): 0.1  Width (cm): 0.5  Depth (cm): 0.4  Surface Area (cm^2): 0.05    Post-debridement measurements  Length (cm): 0.5  Width (cm): 0.7  Depth (cm): 0.6  Percent debrided: 100%  Surface Area (cm^2): 0.35  Area Debrided (cm^2): 0.35  Volume (cm^3): 0.21    Tissue and other material debrided: subcutaneous tissue  Devitalized tissue debrided: biofilm, callus and slough  Instrument(s) utilized: blade and forceps  Bleeding: medium  Hemostasis obtained with: pressure  Procedural pain (0-10): 2  Post-procedural pain: 4   Response to treatment: procedure was tolerated well    Debridement Old surgical Right   Wound 11/06/23 #1 Right 2nd toe Right    Performed by: Saniya Chavira APRN  Authorized by: Saniya Chavira APRN      Consent   Consent obtained? verbal  Consent given by: patient  Risks discussed? procedural risks discussed    Debridement Details  Performed by: NP  Debridement type: surgical  Level of debridement: subcutaneous tissue    Pre-debridement measurements  Length (cm): 0.3  Width (cm): 0.2  Depth (cm): 0.3  Surface Area (cm^2): 0.06    Post-debridement measurements  Length (cm): 0.3  Width (cm): 0.4  Depth (cm): 0.4  Percent debrided: 100%  Surface Area (cm^2): 0.12  Area Debrided (cm^2): 0.12  Volume (cm^3): 0.05    Tissue and other material debrided: subcutaneous tissue  Devitalized tissue debrided: biofilm and slough  Instrument(s) utilized: blade and forceps  Bleeding: medium  Hemostasis obtained with:  pressure  Procedural pain (0-10): 2  Post-procedural pain: 4   Response to treatment: procedure was tolerated well    Cellular tissue product application    Date/Time: 1/11/2024 8:45 AM    Performed by: Saniya Chavira APRN  Authorized by: Saniya Chavira APRN  Associated wounds:   Wound 11/06/23 #2 Right plantar foot Foot Right;Plantar  Wound 11/06/23 #1 Right 2nd toe Right  Consent:     Consent obtained:  Verbal    Consent given by:  Patient    Alternatives discussed:  Alternative treatment  Procedure details:     Location:  head/hands/feet/digits/genitalia    Product applied:  Kerecis omega3    Product lot #:  [id number]    Product expiration:  3/1/2026    Amount used (cm^2):  4    Amount wasted (cm^2):  0    Secured/Fixated: Yes      Secured/Fixated with:  Silicone contact layer  Post-procedure details:     Patient tolerance of procedure:  Tolerated well, no immediate complications  Comments:      Kerecis applied, fixated with silicone contact layer, dressed with hydrofera transfer, and bordered foam.

## 2024-01-17 NOTE — PROGRESS NOTES
CHIEF COMPLAINT:     Chief Complaint   Patient presents with    Wound Care     Patient is here for a wound care follow up. Patient arrives with a Defender boot on the right, and wearing compression. Patient complains of pain to the wound. No other concerns at this time.      HPI:   Information obtained from patient, family and chart  8-31-23 INITIAL 79 yo female with Pmhx of PAD, esrd on dialysis, htn, hx of gangrene of lower extremities and carotid stenosis.  Follows primarily with [facility].  Patient is known to me from an admission to wound clinic in late 2019-early 2020 she was discharged resolved in feb 2020.  Primary care with katherin bhardwaj.  Hospitalized with sepsis and covid in beginning of July.  Was dc'd to [facility] and states that when she was in the hospital her feet and legs swelled and areas started to open. Records/hx are limited due to getting care from [facility] and [facility].  She presents without any dressings on. She states [facility] has been treating the areas with betadine.  Will need [facility] records. She is coming from getting what sounds like a tbi done at [facility] today.  She last had vascular intervention done to her lower extremities with dr. Cassidy at [facility] and states if she needs anything done she wants to go back to him.  I d/w her that I am concerned about her blood flow to both feet.  At this time all wounds are stable without drainage or s/s of infection.  We discussed keeping dry until blood flow can be established. The right 2nd digit has a dorsal and plantar wound and is edematous. Will order xray to r/o osteo. She will get an appointment with dr. Cassidy. She will f/u in 1 month-sooner if she can undergo vascular intervention sooner.    HPI PRIOR TO JANUARY 2024 SEE INDIVUDUAL PROGRESS NOTES  1-4-24 patient returns.  There has been some issue with getting the defender boot as there was question if patient had been dc'd from [facility] (which was August).  This issue has  been cleared up and process re-started to order defender boot.  Today patient is very sensitive to any touch of the foot both plantarly and dorsally, she states her pain has been more this week but she thought it was because she was doing more for the holidays. She denies systemic symptoms or malodor. She does have errythema that is dorsal that was marked for monitoring and did not fade with elevation. I d/w daughter and patient that I am very concerned given her lack of blood flow, new pain, and new errythema and I am recommending she go to ed for stat assessment with labs and xrays.  Patient has a gi appointment later today that she does not want to miss as she has been dealing with cdiff.  I discussed with them the risk for limb/life loss, they verbalize understanding but state the \"GI appointment is important\". I will order labs and xray and patient should dress with gentamicin ointment if she decides not to go to ED. Wound was cultured, although with her cdiff hx I let patient and daughter know that we will most likely need to get ID involved. We may also need to get a repeat mri dependent on xrays.    1-11-24 patient returns.  She did get her xray which was negative for changes from November and no evidence of osteo. She also got her labs drawn which she did not have a white count. Her crp remain elevated.  She saw GI and they sent a note stating that she would be on flagyl 500tid x 3 weeks and will transition to dificid once ok'd by her insurance.  Her culture was + for staph lugdenisis.  I originally had her on topical, but per daughter the redness and pain were not improving, so I also did start her on cefdnir po. Finally she did get her defender boot a couple days ago.  Kercis, epicord, theraskin are covered. Patienti s c/o \"uneven ness\" of the defender and her shoe. We discussed the \"even up\" device for her other shoe.  Her only other option would be a tcc.  She has an arterial study scheduled for next  week. She is much less tender than last week, the surrounding tissue is pink in color. Will debride today and place keracis.    1-18-24 Patient returns. Her arterial duplex is tomorrow. keracis was placed last week. Today the plantar wound has visible bone, I suspect the two wounds communicate.  We discussed the patient needs to f/u with Dr. Ba.  Patient is to make sure she gets the arterial study done. We discussed need for MRI, but I will defer to podiatry if that is needed or not at this time. No acute s/s of infection.  MEDICATIONS:     Current Outpatient Medications:     cefdinir 300 MG Oral Cap, Take 1 capsule (300 mg total) by mouth every other day for 10 days. Take capsule after dialysis treatment, Disp: 5 capsule, Rfl: 0    gentamicin 0.1 % External Ointment, Apply 1 Application topically 2 (two) times daily., Disp: 30 g, Rfl: 0    HYDROcodone-acetaminophen 5-325 MG Oral Tab, Take 1 tablet by mouth every 4 (four) hours as needed., Disp: 15 tablet, Rfl: 0    polyethylene glycol, PEG 3350, 17 g Oral Powd Pack, Take 17 g by mouth daily., Disp: , Rfl:     acetaminophen 325 MG Oral Tab, Take 1 tablet (325 mg total) by mouth every 6 (six) hours as needed for Pain., Disp: , Rfl:     allopurinol 300 MG Oral Tab, Take 1 tablet (300 mg total) by mouth daily., Disp: , Rfl:     aspirin 81 MG Oral Tab EC, Take 1 tablet (81 mg total) by mouth daily., Disp: , Rfl:     atorvastatin 10 MG Oral Tab, Take 1 tablet (10 mg total) by mouth nightly., Disp: , Rfl:     calcium acetate 667 MG Oral Cap, Take 1 capsule (667 mg total) by mouth 3 (three) times daily with meals., Disp: , Rfl:     cinacalcet 30 MG Oral Tab, Take 1 tablet (30 mg total) by mouth daily with breakfast., Disp: , Rfl:     clopidogrel 75 MG Oral Tab, Take 1 tablet (75 mg total) by mouth daily., Disp: , Rfl:     fenofibrate 145 MG Oral Tab, Take 1 tablet (145 mg total) by mouth daily., Disp: , Rfl:     imatinib 100 MG Oral Tab, Take 3 tablets (300 mg total) by  mouth daily., Disp: , Rfl:     Magnesium 500 MG Oral Tab, Take by mouth., Disp: , Rfl:     cyanocobalamine 1000 MCG Oral Tab, Take 1 tablet (1,000 mcg total) by mouth daily., Disp: , Rfl:     cholecalciferol 50 MCG (2000 UT) Oral Cap, Take 1 capsule (2,000 Units total) by mouth daily., Disp: , Rfl:     carvedilol 25 MG Oral Tab, Take 6.25 mg by mouth 2 (two) times daily with meals. Per patient, nephrologist decreased to once daily., Disp: , Rfl:   ALLERGIES:     Allergies   Allergen Reactions    Epinephrine OTHER (SEE COMMENTS)     Dental epi. Pain for 2 weeks after dental procedure.      REVIEW OF SYSTEMS:   This information was obtained from the patient/family and chart.    See HPI for pertinent positives, otherwise 10 pt ROS negative.    HISTORY:   Past medical, surgical, family and social history updated where appropriate.    PHYSICAL EXAM:     Vitals:    01/18/24 0916   BP: 123/66   Pulse: 72   Resp: 17   Temp: 98.3 °F (36.8 °C)     Estimated body mass index is 28.12 kg/m² as calculated from the following:    Height as of 9/28/23: 60\".    Weight as of 10/4/23: 144 lb (65.3 kg).   No results found for: \"PGLU\"    Vital signs reviewed.Appears stated age, well groomed.    Constitutional:  Bp wnl for patient. Pulse Regular and wnl for patient. Respirations easy and unlabored. Temperature wnl. Elevated bmi. Appearance neat and clean. Appears in no acute distress. Well nourished and well developed.    Lower extremity:  dp weakly palpable right,. Right lower extremity free of varicosities, edema reduced,. Capillary refill < 3 seconds. Digits are cool.  toenails are wnl for color, thickness and hygeine. Right hallux and 2nd amputated. Skin hydration wnl. no hairgrowth on legs.    Musculoskeletal:  Patient is in wheelchair propelled by others, able to transfer with assist  Integumentary:  refer to wound characteristics and images   Psychiatric:  Judgment and insight intact. Alert and oriented times 3. No evidence of  depression, anxiety, or agitation. Calm, cooperative, and communicative.   EDEMA:   Calf     Point of Measurement - Right Calf: 32        Right Calf from:: Heel  Right Calf cm:: 27.8  Ankle     Point of Measurement - Right Ankle: 10           Right Ankle from:: Heel  Right Ankle cm:: 17.9     DIAGNOSTICS:     Lab Results   Component Value Date    BUN 24 (H) 10/04/2023    CREATSERUM 5.74 (H) 10/04/2023    ALB 2.5 (L) 09/28/2023    TP 6.0 (L) 09/28/2023     Lab Results   Component Value Date    ESRML 9 01/04/2024    ESRML 1 09/28/2023      Lab Results   Component Value Date    CRP 2.55 (H) 01/04/2024    CRP 2.64 (H) 09/28/2023 1-4-24 XR right foot  FINDINGS:    Redemonstrated are postsurgical changes 1st transmetatarsal amputation and 2nd digit amputation.  There is a surgical screw within the mid 1st metatarsal, no evidence of hardware loosening or failure.  No acute fractures.  There is generalized  osteopenia.  Vascular calcifications are present.  Calcaneal Achilles and plantar enthesophytes are present.  CONCLUSION:  No significant interval changes compared to 10/5/2023.  No radiographic evidence osteomyelitis.     11-4-23 mri right foot  CONCLUSION:    1. Postsurgical changes within the 1st and 2nd digit as described above.   2. No marrow edema identified to suggest osteomyelitis.   3. Mild edema and noted within the subcutaneous fat   10-5-23 pathology  10-A.  Right second digit clearance fragment:  -Benign bone, cartilage, and dense connective tissue, negative for osteomyelitis or acute inflammation.     B.  Right second digit, amputation:  -Deep chronic ulcer, with exposed phalangeal bone, and osteomyelitis.  -Proximal bone and soft tissue appear negative for osteomyelitis/acute inflammation.  1-23 CTA with run off  CONCLUSION:    1. Extensive diffuse atherosclerotic calcification.  The degree of calcification limits evaluation by CT angiography, with nondiagnostic visualization of the infrapopliteal  runoff vessels.  Consider catheter angiography.   2. The extent of calcification of the superficial femoral and popliteal arteries is difficult to differentiate from vascular stents and clinical correlation is recommended.   3. No significant iliac artery stenosis.   4. Critical stenosis of proximal left renal artery and moderate stenosis of proximal right renal artery.   5. Moderate stenosis of distal right superficial femoral artery and diffuse narrowing of right popliteal artery.   6. Probable diffuse high-grade narrowing of heavily calcified right profunda femoral artery.   7. Focal stenoses estimated at 70% of the distal left superficial femoral artery and proximal and distal right popliteal artery.   8. 8 mm right middle lobe nodule.  9. Uncomplicated colonic diverticula.       9-1-23 right foot xray  CONCLUSION:    1. Postsurgical changes involve the 1st digit.   2. Correlate clinically with ulceration.  If osteomyelitis is of high clinical concern, recommend MRI for further evaluation.     WOUND ASSESSMENT:     Wound 11/06/23 #2 Right plantar foot Foot Right;Plantar (Active)   Date First Assessed/Time First Assessed: 11/06/23 1546    Wound Number (Wound Clinic Only): #2 Right plantar foot  Primary Wound Type: Arterial Ulcer  Location: Foot  Wound Location Orientation: Right;Plantar      Assessments 11/6/2023  3:48 PM 1/18/2024  9:13 AM   Wound Image       Drainage Amount Small Small   Drainage Description Serosanguineous Serosanguineous   Wound Length (cm) 0.3 cm 0.4 cm   Wound Width (cm) 0.4 cm 0.5 cm   Wound Surface Area (cm^2) 0.12 cm^2 0.2 cm^2   Wound Depth (cm) 0.2 cm 0.3 cm   Wound Volume (cm^3) 0.024 cm^3 0.06 cm^3   Wound Healing % -- -150   Margins Well-defined edges Well-defined edges   Non-staged Wound Description Full thickness Full thickness   Mya-wound Assessment Edema;Callous Edema   Wound Granulation Tissue Pink;Firm;Pale Grey Pink;Firm   Wound Bed Granulation (%) 100 % 10 %   Wound Bed  Slough (%) -- 10 %   Wound Odor None None   Exposed Structure -- Bone   Shape -- probing to bone, 80 percent bone   Tunneling? No --   Undermining? No --   Sinus Tracts? No --       Inactive Orders   Date Order Priority Status Authorizing Provider   01/11/24 0845 Cellular tissue product application Routine Completed Saniya Chavira, APRN   01/11/24 0844 Debridement Arterial Ulcer Right;Plantar Foot Routine Completed Saniya Chavira, MACY   12/18/23 1544 Cellular tissue product application Diabetic Ulcer Right;Plantar Foot Routine Completed Colten Ba DPM   12/18/23 1524 Debridement Diabetic Ulcer Right;Plantar Foot Routine Completed Colten Ba DPM   12/11/23 1547 Cellular tissue product application Venous Ulcer Right;Plantar Foot Routine Completed Colten Ba DPM   12/11/23 1536 Debridement Venous Ulcer Right;Plantar Foot Routine Completed Colten Ba DPM   12/04/23 1533 Cellular tissue product application Venous Ulcer Right;Plantar Foot Routine Completed Colten Ba DPM   12/04/23 1513 Debridement Venous Ulcer Right;Plantar Foot Routine Completed Colten Ba DPM   11/27/23 1542 Cellular tissue product application Venous Ulcer Right;Plantar Foot Routine Completed Colten Ba DPM   11/27/23 1523 Debridement Venous Ulcer Right;Plantar Foot Routine Completed Colten Ba DPM   11/20/23 1649 Debridement Venous Ulcer Right;Plantar Foot Routine Completed Colten Ba DPM   11/06/23 1608 Debridement Venous Ulcer Right;Plantar Foot Routine Completed Colten Ba DPM       Wound 11/06/23 #1 Right 2nd toe Right (Active)   Date First Assessed/Time First Assessed: 11/06/23 1546    Wound Number (Wound Clinic Only): #1 Right 2nd toe  Primary Wound Type: (c) Old surgical  Wound Location Orientation: Right      Assessments 11/6/2023  3:48 PM 1/18/2024  9:12 AM   Wound Image       Drainage Amount None Small   Drainage Description -- Serosanguineous   Wound Length (cm) 0.9 cm 0.8 cm   Wound Width  (cm) 0.4 cm 0.5 cm   Wound Surface Area (cm^2) 0.36 cm^2 0.4 cm^2   Wound Depth (cm) 0 cm 0.6 cm   Wound Volume (cm^3) 0 cm^3 0.24 cm^3   Margins Well-defined edges Well-defined edges   Non-staged Wound Description Full thickness Full thickness   Mya-wound Assessment Edema;Dry Edema   Wound Bed Slough (%) -- 100 %   Wound Odor None None   Shape 100% Scab --   Tunneling? No --   Undermining? No --   Sinus Tracts? No --       Inactive Orders   Date Order Priority Status Authorizing Provider   01/11/24 0845 Cellular tissue product application Routine Completed Saniya Chavira, APRMARQUISE   01/11/24 0844 Debridement Old surgical Right Routine Completed Saniya Chavira, APRN   12/18/23 1546 Cellular tissue product application Old surgical Right Routine Completed Colten Ba DPM   12/18/23 1526 Debridement Old surgical Right Routine Completed Colten Ba DPM           ASSESSMENT AND PLAN:      1. 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 ESRD (end stage renal disease) (HCC)    4. PAD (peripheral artery disease) (HCC)        Risks, benefits, and alternatives of current treatment plan discussed in detail.  Questions and concerns addressed. Red flags to RTC or ED reviewed.  Patient (or parent) agrees to plan.      NOTE TO PATIENT: The 21st Century Cures Act makes clinical notes like these available to patients in the interest of transparency. Clinical notes are medical documents used by physicians and care providers to communicate with each other. These documents include medical language and terminology, abbreviations, and treatment information that may sound technical and at times possibly unfamiliar. In addition, at times, the verbiage may appear blunt or direct. These documents are one tool providers use to communicate relevant information and clinical opinions of the care providers  in a way that allows common understanding of the clinical context.   I spent 30 minutes with the patient. This time included:    preparing to see the patient (eg, review notes and recent diagnostics),  seeing the patient, obtaining and/or reviewing separately obtained history, performing a medically appropriate examination and/or evaluation, counseling and educating the patient, documenting in the record. Communication with dr. ba  DISCHARGE:      Patient Instructions   Please return: Monday with Dr. Ba    Patient discharge and wound care instructions  Maggie Pelletier  1/18/2024    You may shower with protection of the wound (ie a cast cover or similar).     Changing your dressing:           leave in place until monday  Wash your hands with soap and water.   Remove old dressing, discard into plastic bag and place into trash.   Cleanse the wound with Normal Saline or specified wound cleanser prior to applying a clean dressing using gauze sponges, not tissues or cotton balls.   Pat dry using gauze sponges, not tissue or cotton balls. Bleeding is ok.  APPLY Dressing: Silver alginate>foam    Offloading  Please wear this DEFENDER at all times as instructed to you. If the shoe is on your right foot do not drive with this shoe on.       Nutrition and blood sugar control:  Focus on the following:  Protein: Meats, beans, eggs, milk and yogurt particularly Greek yogurt), tofu, soy nuts, soy protein products (Follow the protein handout in your welcome folder)  Vitamin C: Citrus fruits and juices, strawberries, tomatoes, tomato juice, peppers, baked potatoes, spinach, broccoli, cauliflower, [place] sprouts, cabbage  Vitamin A: Dark green, leafy vegetables, orange or yellow vegetables, cantaloupe, fortified dairy products, liver, fortified cereals  Zinc: Fortified cereals, red meats, seafood  Consider supplementing with [place] by [employer] OR DPP dipeptic power by [employer]. both products can be purchased on amazon  (These are essential branch chain amino acids that help with tissue building and wound healing).   When your blood sugar is consistently elevated greater than 180 your body can't heal or fight infection.     Concerns:  Signs of infection may include the following:  Increase in redness  Red \"streaks\" from wound  Increase in swelling  Fever  Unusual odor  Change in the amount of wound drainage     Should you experience any significant changes in your wound(s) or have any questions regarding your home care instructions please contact the [facility] @ [phone] If after regular business hours, please call your family doctor or local emergency room. The treatment plan has been discussed at length between you and your provider. Follow all instructions carefully, it is very important. If you do not follow all instructions you are at risk of your wound not healing, infection, possible loss of limb and even loss of life.    Saniya Chavira FNP-C, CWCN-AP, CFCN, CSWS, WCC, DWC  1/18/2024

## 2024-01-18 ENCOUNTER — OFFICE VISIT (OUTPATIENT)
Dept: WOUND CARE | Facility: HOSPITAL | Age: 79
End: 2024-01-18
Attending: NURSE PRACTITIONER
Payer: MEDICARE

## 2024-01-18 VITALS
SYSTOLIC BLOOD PRESSURE: 123 MMHG | RESPIRATION RATE: 17 BRPM | DIASTOLIC BLOOD PRESSURE: 66 MMHG | HEART RATE: 72 BPM | TEMPERATURE: 98 F

## 2024-01-18 DIAGNOSIS — N18.6 ESRD (END STAGE RENAL DISEASE) (HCC): ICD-10-CM

## 2024-01-18 DIAGNOSIS — L97.516 NON-PRESSURE CHRONIC ULCER OF OTHER PART OF RIGHT FOOT WITH BONE INVOLVEMENT WITHOUT EVIDENCE OF NECROSIS (HCC): Primary | ICD-10-CM

## 2024-01-18 DIAGNOSIS — Z89.421 STATUS POST AMPUTATION OF LESSER TOE OF RIGHT FOOT (HCC): ICD-10-CM

## 2024-01-18 DIAGNOSIS — I73.9 PAD (PERIPHERAL ARTERY DISEASE) (HCC): ICD-10-CM

## 2024-01-18 PROCEDURE — 99214 OFFICE O/P EST MOD 30 MIN: CPT | Performed by: NURSE PRACTITIONER

## 2024-01-18 NOTE — PROGRESS NOTES
.Weekly Wound Education Note    Teaching Provided To: Patient;Family  Training Topics: Discharge instructions;Dressing;Cleasing and general instructions;Test/procedures;Off-loading  Training Method: Explain/Verbal;Written  Training Response: Patient responds and understands;Reinforcement needed            Patient has arterial ultrasound 1/19/24.  Will follow up with Dr. Ba on Monday, 1/22/24 to discuss possible further intervention.  Silver alginate, thick foam to wounds, may stay I place until Monday.  Sbda  size D.  Patient to continue wearing Defender boot.  Patient has follow up with Dr Hernandez next week.

## 2024-01-18 NOTE — PATIENT INSTRUCTIONS
Please return: Monday with Dr. Ba    Patient discharge and wound care instructions  Maggie Pelletier  1/18/2024    You may shower with protection of the wound (ie a cast cover or similar).     Changing your dressing:           leave in place until monday  Wash your hands with soap and water.   Remove old dressing, discard into plastic bag and place into trash.   Cleanse the wound with Normal Saline or specified wound cleanser prior to applying a clean dressing using gauze sponges, not tissues or cotton balls.   Pat dry using gauze sponges, not tissue or cotton balls. Bleeding is ok.  APPLY Dressing: Silver alginate>foam    Offloading  Please wear this DEFENDER at all times as instructed to you. If the shoe is on your right foot do not drive with this shoe on.       Nutrition and blood sugar control:  Focus on the following:  Protein: Meats, beans, eggs, milk and yogurt particularly Greek yogurt), tofu, soy nuts, soy protein products (Follow the protein handout in your welcome folder)  Vitamin C: Citrus fruits and juices, strawberries, tomatoes, tomato juice, peppers, baked potatoes, spinach, broccoli, cauliflower, Holmes sprouts, cabbage  Vitamin A: Dark green, leafy vegetables, orange or yellow vegetables, cantaloupe, fortified dairy products, liver, fortified cereals  Zinc: Fortified cereals, red meats, seafood  Consider supplementing with Noble by [employer] OR DPP dipeptic power by [employer]. both products can be purchased on amazon (These are essential branch chain amino acids that help with tissue building and wound healing).   When your blood sugar is consistently elevated greater than 180 your body can't heal or fight infection.     Concerns:  Signs of infection may include the following:  Increase in redness  Red \"streaks\" from wound  Increase in swelling  Fever  Unusual odor  Change in the amount of wound drainage     Should you experience any significant changes in your wound(s) or have any  questions regarding your home care instructions please contact the wound center [facility] @ [phone] If after regular business hours, please call your family doctor or local emergency room. The treatment plan has been discussed at length between you and your provider. Follow all instructions carefully, it is very important. If you do not follow all instructions you are at risk of your wound not healing, infection, possible loss of limb and even loss of life.

## 2024-01-22 ENCOUNTER — OFFICE VISIT (OUTPATIENT)
Dept: WOUND CARE | Facility: HOSPITAL | Age: 79
End: 2024-01-22
Attending: NURSE PRACTITIONER
Payer: MEDICARE

## 2024-01-22 VITALS
DIASTOLIC BLOOD PRESSURE: 35 MMHG | TEMPERATURE: 98 F | HEART RATE: 72 BPM | RESPIRATION RATE: 16 BRPM | SYSTOLIC BLOOD PRESSURE: 105 MMHG

## 2024-01-22 DIAGNOSIS — Z89.421 STATUS POST AMPUTATION OF LESSER TOE OF RIGHT FOOT (HCC): ICD-10-CM

## 2024-01-22 DIAGNOSIS — M79.671 RIGHT FOOT PAIN: ICD-10-CM

## 2024-01-22 DIAGNOSIS — L97.516 NON-PRESSURE CHRONIC ULCER OF OTHER PART OF RIGHT FOOT WITH BONE INVOLVEMENT WITHOUT EVIDENCE OF NECROSIS (HCC): Primary | ICD-10-CM

## 2024-01-22 DIAGNOSIS — N18.6 ESRD (END STAGE RENAL DISEASE) (HCC): ICD-10-CM

## 2024-01-22 DIAGNOSIS — I73.9 PAD (PERIPHERAL ARTERY DISEASE) (HCC): ICD-10-CM

## 2024-01-22 DIAGNOSIS — M20.5X2 HALLUX LIMITUS OF LEFT FOOT: ICD-10-CM

## 2024-01-22 PROCEDURE — 99214 OFFICE O/P EST MOD 30 MIN: CPT | Performed by: PODIATRIST

## 2024-01-22 RX ORDER — HYDROCODONE BITARTRATE AND ACETAMINOPHEN 5; 325 MG/1; MG/1
1 TABLET ORAL EVERY 6 HOURS PRN
Qty: 28 TABLET | Refills: 0 | Status: SHIPPED | OUTPATIENT
Start: 2024-01-22 | End: 2024-01-29

## 2024-01-22 NOTE — PROGRESS NOTES
Subjective   Maggie Pelletier is a 78 year old female.    Chief Complaint   Patient presents with    Wound Recheck     Pt here for follow up arrives with gentamicin, gauze and tape to wounds with spanda. Patient removed dressing Sunday because it was burning        HPI  Maggie Pelletier is a 78 year old female.   Chief Complaint   Patient presents with    Wound Recheck     Pt here for follow up arrives with gentamicin, gauze and tape to wounds with spanda. Patient removed dressing Sunday because it was burning          Review of Systems  Denies nausea, vomiting, fever, chills, shortness of breath, chest pain, and calf pain.    Objective    Physical Exam:    Derm:  Wound Assessment  Wound 11/06/23 #2 Right plantar foot Foot Right;Plantar (Active)   Wound Image   01/22/24 1551   Drainage Amount Moderate 01/22/24 1551   Drainage Description Yellow;Serous 01/22/24 1551   Treatments Compression 01/22/24 1551   Wound Length (cm) 0.6 cm 01/22/24 1551   Wound Width (cm) 0.5 cm 01/22/24 1551   Wound Surface Area (cm^2) 0.3 cm^2 01/22/24 1551   Wound Depth (cm) 0.4 cm 01/22/24 1551   Wound Volume (cm^3) 0.12 cm^3 01/22/24 1551   Wound Healing % -400 01/22/24 1551   Margins Well-defined edges 01/22/24 1551   Non-staged Wound Description Full thickness 01/22/24 1551   Mya-wound Assessment Edema 01/22/24 1551   Wound Granulation Tissue Pink;Firm 01/18/24 0913   Wound Bed Granulation (%) 10 % 01/18/24 0913   Wound Bed Slough (%) 10 % 01/18/24 0913   Wound Odor None 01/18/24 0913   Exposed Structure Bone 01/18/24 0913   Shape 100 percent bone exposed 01/22/24 1551   Tunneling? No 01/22/24 1551   Undermining? No 01/22/24 1551   Sinus Tracts? No 01/22/24 1551       Wound 11/06/23 #1 Right 2nd toe Right (Active)   Wound Image   01/22/24 1552   Drainage Amount Moderate 01/22/24 1552   Drainage Description Serosanguineous 01/22/24 1552   Treatments Compression 01/22/24 1552   Wound Length (cm) 0.9 cm 01/22/24 1552   Wound Width  (cm) 0.5 cm 01/22/24 1552   Wound Surface Area (cm^2) 0.45 cm^2 01/22/24 1552   Wound Depth (cm) 0.7 cm 01/22/24 1552   Wound Volume (cm^3) 0.315 cm^3 01/22/24 1552   Margins Well-defined edges 01/22/24 1552   Non-staged Wound Description Full thickness 01/22/24 1552   Mya-wound Assessment Edema 01/22/24 1552   Wound Granulation Tissue Pink;Pale Mack;Firm 01/11/24 0813   Wound Bed Granulation (%) 100 % 01/11/24 0813   Wound Bed Slough (%) 100 % 01/22/24 1552   Wound Odor None 01/22/24 1552   Shape probe to bone 01/22/24 1552   Tunneling? Yes 01/22/24 1552   Number of Tunnels 1 01/22/24 1552   Tunnel 1 Location 12 01/22/24 1552   Tunnel 1 Depth 0.7 01/22/24 1552   Undermining? No 12/11/23 1458   Undermine 1 Start Position 6 01/22/24 1552   Sinus Tracts? No 12/11/23 1458       ***  Vascular: pedal pulses are palpable. CFT <3 sec to digits  Musculoskeletal: no acute deformities noted.  5/5 muscle strength to all muscles crossing ankle joint  Neurological: Gross sensation intact via light touch.  Protective sensation diminished via Ipswitch test.    Vital Signs  Vital Signs    01/22/24 1500   BP: 105/35   Pulse: 72   Resp: 16   Temp: 98.4 °F (36.9 °C)   PainSc: 7 - (Severe)         Allergies  Allergies   Allergen Reactions    Epinephrine OTHER (SEE COMMENTS)     Dental epi. Pain for 2 weeks after dental procedure.       Assessment    Encounter Diagnosis  1. 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 Status post amputation of lesser toe of right foot ([facility])    3. ESRD (end stage renal disease) ([facility])    4. PAD (peripheral artery disease) ([facility])    5. Right foot pain        Problem List  Patient Active Problem List   Diagnosis    Gangrene of toe of right foot ([facility])    Ischemic pain of foot, right    PAD (peripheral artery disease) ([facility])    Great toe amputation status, right    Gangrene of right foot ([facility])    ESRD (end stage renal disease) ([facility])     Primary hypertension    Anemia in ESRD (end-stage renal disease)  ([facility])    Carotid stenosis, right    Osteomyelitis, unspecified site, unspecified type (HCC)    Ulcer of right foot, unspecified ulcer stage (HCC)    CLL (chronic lymphocytic leukemia) ([facility]CC)    Status post amputation of lesser toe of right foot ([facility])       Plan  Orders:  No orders of the defined types were placed in this encounter.        -Patient examined, chart history reviewed.  -Discussed etiology of condition and various treatment options  ***    -Rx: Norco 5/325mg d/t patient's continuing severe pain to right foot ulcer    -Educated on signs of infection and encouraged patient to seek immediate medical attention if noticing any of these signs.    Follow-Up  ***    Kev Ba DPM    1/22/2024    Dragon speech recognition software was used to prepare this note.  Errors in word recognition may occur.  Please contact me with any questions/concerns with this note.      Azael in the past and would like to follow-up with him.  I did contact him and he is planning to add patient to his outpatient schedule this week to see her.    -Pending vascular recommendations, patient may need to move forward with an MRI for further evaluation for osteomyelitis.  This may also warrant further surgical treatment options.  We will revisit this pending recommendations from vascular surgery.    -Gentamicin and gauze applied to right foot ulcerations today.  Patient will change daily.    -Patient also given Spandage  size E for her to utilize for edema control.  Recommend removing at night.    -Patient also given offloading felt pads to apply to her bunion of the left foot to prevent any increased pressure due to the deformity.    -Rx: Norco 5/325mg d/t patient's continuing severe pain to right foot ulcer    -Educated on signs of infection and encouraged patient to seek immediate medical attention if noticing any of these signs.    Follow-Up  After evaluation with vascular surgery    Kev Ba DPM    1/22/2024    Dragon speech recognition software was used to prepare this note.  Errors in word recognition may occur.  Please contact me with any questions/concerns with this note.

## 2024-01-22 NOTE — PROGRESS NOTES
Weekly Wound Education Note    Teaching Provided To: Patient;Family  Training Topics: Dressing;Cleasing and general instructions;Discharge instructions;Test/procedures  Training Method: Demonstration;Explain/Verbal;Written  Training Response: Patient responds and understands        Notes: Cleanse right foot wounds with saline or wound cleanser, apply gentamicin and gauze secured with tape change twice daily E spanda to right leg from base of toes to just below knee (may remove at bedtime and reapply first thing in the morning) Continue to wear defender boot whenever you are walking or standing. Felted foam to left foot bunion

## 2024-01-24 ENCOUNTER — APPOINTMENT (OUTPATIENT)
Dept: WOUND CARE | Facility: HOSPITAL | Age: 79
End: 2024-01-24
Attending: NURSE PRACTITIONER
Payer: MEDICARE

## 2024-02-01 ENCOUNTER — APPOINTMENT (OUTPATIENT)
Dept: WOUND CARE | Facility: HOSPITAL | Age: 79
End: 2024-02-01
Attending: NURSE PRACTITIONER
Payer: MEDICARE

## 2024-02-01 ENCOUNTER — HOSPITAL ENCOUNTER (OUTPATIENT)
Dept: ULTRASOUND IMAGING | Facility: HOSPITAL | Age: 79
Discharge: HOME | End: 2024-02-01
Attending: SURGERY
Payer: MEDICARE

## 2024-02-01 DIAGNOSIS — T82.318A BREAKDOWN (MECHANICAL) OF OTHER VASCULAR GRAFTS, INITIAL ENCOUNTER (HCC): ICD-10-CM

## 2024-02-01 DIAGNOSIS — I65.23 OCCLUSION AND STENOSIS OF BILATERAL CAROTID ARTERIES: ICD-10-CM

## 2024-02-01 DIAGNOSIS — I70.235 ATHEROSCLEROSIS OF NATIVE ARTERY OF RIGHT LOWER EXTREMITY WITH ULCERATION OF OTHER PART OF FOOT (HCC): ICD-10-CM

## 2024-02-01 PROCEDURE — 93970 EXTREMITY STUDY: CPT | Performed by: SURGERY

## 2024-02-01 PROCEDURE — 93880 EXTRACRANIAL BILAT STUDY: CPT | Performed by: SURGERY

## 2024-02-01 PROCEDURE — 93925 LOWER EXTREMITY STUDY: CPT | Performed by: SURGERY

## 2024-02-05 ENCOUNTER — OFFICE VISIT (OUTPATIENT)
Dept: WOUND CARE | Facility: HOSPITAL | Age: 79
End: 2024-02-05
Attending: NURSE PRACTITIONER
Payer: MEDICARE

## 2024-02-05 VITALS
TEMPERATURE: 98 F | HEART RATE: 72 BPM | SYSTOLIC BLOOD PRESSURE: 112 MMHG | RESPIRATION RATE: 17 BRPM | DIASTOLIC BLOOD PRESSURE: 40 MMHG

## 2024-02-05 DIAGNOSIS — L97.516 NON-PRESSURE CHRONIC ULCER OF OTHER PART OF RIGHT FOOT WITH BONE INVOLVEMENT WITHOUT EVIDENCE OF NECROSIS (HCC): Primary | ICD-10-CM

## 2024-02-05 DIAGNOSIS — I73.9 PAD (PERIPHERAL ARTERY DISEASE) (HCC): ICD-10-CM

## 2024-02-05 DIAGNOSIS — M79.671 RIGHT FOOT PAIN: ICD-10-CM

## 2024-02-05 DIAGNOSIS — Z89.421 STATUS POST AMPUTATION OF LESSER TOE OF RIGHT FOOT (HCC): ICD-10-CM

## 2024-02-05 DIAGNOSIS — N18.6 ESRD (END STAGE RENAL DISEASE) (HCC): ICD-10-CM

## 2024-02-05 PROCEDURE — 99213 OFFICE O/P EST LOW 20 MIN: CPT | Performed by: PODIATRIST

## 2024-02-05 NOTE — PROGRESS NOTES
Subjective   Maggie Pelletier is a 78 year old female.    Chief Complaint   Patient presents with    Wound Care     Patient arrives for a wound care follow up appointment. Patient arrives wearing a Defender boot on right foot. Patient said that Spandagrip was too tight, possibly caused loss of circulation in lower extremities.        HPI  Maggie Pelletier is a 78 year old female with end-stage renal disease on dialysis, peripheral arterial disease, and chronic lymphocytic leukemia who is returning to wound care center for ulceration to the plantar and dorsal aspects of her right second metatarsal head.  Unfortunately, patient does have exposed metatarsal head within the wounds.  Patient does have known peripheral arterial disease, and did have arterial studies done last week, which were ordered by Dr. Addison.  Patient is waiting to hear from his office in regards to results and further recommendations.  Patient does also relate some redness to her right foot that she noticed last Thursday.  Since then, it has improved.  Patient is denying any increase in pain to the area worse than her baseline pain.  Denies noticing any pus coming from the wound.  She has been covering the wound with gentamicin and gauze.  No other concerns.    Review of Systems  Denies nausea, vomiting, fever, chills, shortness of breath, chest pain, and calf pain.    Objective    Physical Exam:    Derm:  Wound Assessment  Wound 11/06/23 #2 Right plantar foot Foot Right;Plantar (Active)   Wound Image   02/05/24 1509   Drainage Amount Small 02/05/24 1509   Drainage Description Serous;Yellow 02/05/24 1509   Treatments Compression 01/22/24 1551   Wound Length (cm) 0.5 cm 02/05/24 1509   Wound Width (cm) 0.3 cm 02/05/24 1509   Wound Surface Area (cm^2) 0.15 cm^2 02/05/24 1509   Wound Depth (cm) 0.3 cm 02/05/24 1509   Wound Volume (cm^3) 0.045 cm^3 02/05/24 1509   Wound Healing % -88 02/05/24 1509   Margins Well-defined edges 02/05/24 1509    Non-staged Wound Description Full thickness 02/05/24 1509   Mya-wound Assessment Edema;Moist 02/05/24 1509   Wound Granulation Tissue Pink;Firm 02/05/24 1509   Wound Bed Granulation (%) 20 % 02/05/24 1509   Wound Bed Slough (%) 10 % 01/18/24 0913   Wound Odor None 02/05/24 1509   Exposed Structure Bone 02/05/24 1509   Shape 100 percent bone exposed 01/22/24 1551   Tunneling? No 02/05/24 1509   Undermining? No 02/05/24 1509   Sinus Tracts? No 02/05/24 1509       Wound 11/06/23 #1 Right 2nd toe Right (Active)   Wound Image   02/05/24 1510   Drainage Amount Small 02/05/24 1510   Drainage Description Serous;Yellow 02/05/24 1510   Treatments Compression 01/22/24 1552   Wound Length (cm) 0.9 cm 02/05/24 1510   Wound Width (cm) 0.5 cm 02/05/24 1510   Wound Surface Area (cm^2) 0.45 cm^2 02/05/24 1510   Wound Depth (cm) 0.2 cm 02/05/24 1510   Wound Volume (cm^3) 0.09 cm^3 02/05/24 1510   Margins Well-defined edges 02/05/24 1510   Non-staged Wound Description Full thickness 02/05/24 1510   Mya-wound Assessment Edema 02/05/24 1510   Wound Granulation Tissue Pink;Pale Mack;Firm 01/11/24 0813   Wound Bed Granulation (%) 100 % 01/11/24 0813   Wound Bed Slough (%) 100 % 02/05/24 1510   Wound Odor None 02/05/24 1510   Shape probe to bone 01/22/24 1552   Tunneling? Yes 01/22/24 1552   Number of Tunnels 1 01/22/24 1552   Tunnel 1 Location 12 01/22/24 1552   Tunnel 1 Depth 0.7 01/22/24 1552   Undermining? No 12/11/23 1458   Undermine 1 Start Position 6 01/22/24 1552   Sinus Tracts? No 12/11/23 1458       Vascular: pedal pulses are nonpalpable. CFT <5 sec to digits  Musculoskeletal: no acute deformities noted.  Status post right second digit amputation.  Pain with palpation to left plantar foot ulceration with prominent and visible second metatarsal head noted.    Neurological: Gross sensation intact via light touch.  Protective sensation diminished via Ipswitch test.    US CAROTID DOPPLER BILAT - DIAG IMG (CPT=93880)    Result  Date: 2/1/2024  CONCLUSION:  Less than 50% carotid stenosis bilaterally.   LOCATION:  [place]     Dictated by (CST): Trenton Vasquez MD on 2/01/2024 at 1:16 PM     Finalized by (CST): Trenton Vasquez MD on 2/01/2024 at 1:25 PM       US ARTERIAL DUPLEX LOWER EXTREMITY BILATERAL (CPT=93925)    Result Date: 2/1/2024  CONCLUSION:   1. Significantly decreased flow velocities involving the common femoral artery and superficial femoral artery, popliteal artery since previous study with very slow almost occlusive velocities within the posterior tibial artery and anterior tibial artery with monophasic waveform.  Finding may be related to a high-grade stenosis in the region of the iliac arteries with diffuse atherosclerotic disease of the right lower extremity below the knee.  Further evaluation with CTA lower extremity imaging would be  recommended.  2. There is no evidence of high-grade stenosis involving the left above the knee arteries.  There is chronic occlusive disease of the posterior tibial artery with very slow flow but patency involving the anterior tibial artery and dorsalis pedis.   LOCATION:  [place]   Dictated by (CST): Trenton Vasquez MD on 2/01/2024 at 1:07 PM     Finalized by (CST): Trenton Vasquez MD on 2/01/2024 at 1:16 PM       US VEIN MAP LOWER EXTREMITY BILAT (CPT=93970)    Result Date: 2/1/2024  CONCLUSION:  There is chronic thrombus within the superficial veins of the greater saphenous vein bilaterally.   LOCATION:  [place]     Dictated by (CST): Trenton Vasquez MD on 2/01/2024 at 11:57 AM     Finalized by (CST): Trenton Vasquez MD on 2/01/2024 at 12:01 PM          Vital Signs  Vital Signs    02/05/24 1507   BP: 112/40   Pulse: 72   Resp: 17   Temp: 97.6 °F (36.4 °C)   PainSc: 7 - (Severe)         Allergies  Allergies   Allergen Reactions    Epinephrine OTHER (SEE COMMENTS)     Dental epi. Pain for 2 weeks after dental procedure.       Assessment    Encounter Diagnosis  1. Non-pressure chronic ulcer of  other part of right foot with bone involvement without evidence of necrosis ([facility])    2. 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 ESRD (end stage renal disease) ([facility])    4. PAD (peripheral artery disease) ([facility])    5. Right foot pain        Problem List  Patient Active Problem List   Diagnosis    Gangrene of toe of right foot ([facility]CC)    Ischemic pain of foot, right    PAD (peripheral artery disease) ([facility])    Great toe amputation status, right    Gangrene of right foot ([facility])    ESRD (end stage renal disease) ([facility]CC)    Primary hypertension    Anemia in ESRD (end-stage renal disease)  ([facility]CC)    Carotid stenosis, right    Osteomyelitis, unspecified site, unspecified type ([facility]CC)    Ulcer of right foot, unspecified ulcer stage ([facility]CC)    CLL (chronic lymphocytic leukemia) ([facility]CC)    Status post amputation of lesser toe of right foot ([facility])       Plan  Orders:  No orders of the defined types were placed in this encounter.        -Patient examined, chart history reviewed.    -Inspected patient's right plantar and dorsal foot wounds today--fibrogranular wound bed noted to plantar ulceration with exposed/visible second metatarsal head noted.  No purulent drainage, surrounding erythema, or other signs of infection.  Patient's right dorsal wound does tunnel deep and probes to bone.  Some mild erythema noted to dorsum of right foot, which was marked out utilizing a marking pen today.    -Patient instructed to go to the emergency department if redness does worsen.  I reviewed a recent image from patient's daughter's phone that was taken last Thursday where patient's foot was extremely erythematous.  It does appear improved.    -I did review patient's most recent arterial studies, which do show concerns of significant arterial disease to bilateral lower extremities.  See above for impressions.  Patient is established with Dr. Addison, vascular surgeon, who they will be following up with.    -Recommend against excisional  debridement today due to patient's lack of blood flow to the right lower extremity.    -In the meantime, I do recommend moving forward with an MRI of the right foot.  Patient has exposed second metatarsal head, which is new since her previous MRI.  There is concerns of active osteomyelitis.  Patient is agreeable to this and an MRI of the right foot was ordered today.    -Pending vascular recommendations, will discuss further treatment options.  This may also warrant further surgical treatment options.  We will revisit this pending recommendations from vascular surgery.     -Gentamicin and gauze applied to right foot ulcerations today.  Patient will change daily.     -Patient also given Spandage  size E for her to utilize for edema control.  Recommend removing at night.    -Educated on signs of infection and encouraged patient to seek immediate medical attention if noticing any of these signs.    Follow-Up  2 weeks    Kev Ba DPM    2/5/2024    Dragon speech recognition software was used to prepare this note.  Errors in word recognition may occur.  Please contact me with any questions/concerns with this note.

## 2024-02-07 ENCOUNTER — LAB ENCOUNTER (OUTPATIENT)
Dept: LAB | Age: 79
End: 2024-02-07
Attending: INTERNAL MEDICINE
Payer: MEDICARE

## 2024-02-07 DIAGNOSIS — A04.72 INTESTINAL INFECTION DUE TO CLOSTRIDIUM DIFFICILE: Primary | ICD-10-CM

## 2024-02-07 PROCEDURE — 87493 C DIFF AMPLIFIED PROBE: CPT

## 2024-02-08 LAB — C DIFF TOX B STL QL: NEGATIVE

## 2024-02-08 NOTE — PAT NURSING NOTE
PAT call with patient and daughter Heather. She does not have My Chart. The following instructions were given. Questions were answered and they verbalized understanding.    You will get a jair the day before after 3 pm with the time of arrival for the next day. NPO after midnoc except for sips of water with AM meds. Hold vitamins/supplements. Take Plavix and Aspirin the morning of the procedure. Bring dressing supplies. Take a shower with an antibacterial soap prior to the procedure. You will need a . Come to [facility] and check in at  registration desk.

## 2024-02-11 ENCOUNTER — HOSPITAL ENCOUNTER (OUTPATIENT)
Dept: MRI IMAGING | Facility: HOSPITAL | Age: 79
Discharge: HOME | End: 2024-02-11
Attending: PODIATRIST
Payer: MEDICARE

## 2024-02-11 ENCOUNTER — LAB ENCOUNTER (OUTPATIENT)
Dept: LAB | Facility: HOSPITAL | Age: 79
End: 2024-02-11
Attending: PODIATRIST
Payer: MEDICARE

## 2024-02-11 ENCOUNTER — HOSPITAL ENCOUNTER (OUTPATIENT)
Dept: MRI IMAGING | Facility: HOSPITAL | Age: 79
End: 2024-02-11
Attending: PODIATRIST
Payer: MEDICARE

## 2024-02-11 DIAGNOSIS — L97.516 NON-PRESSURE CHRONIC ULCER OF OTHER PART OF RIGHT FOOT WITH BONE INVOLVEMENT WITHOUT EVIDENCE OF NECROSIS (HCC): ICD-10-CM

## 2024-02-11 DIAGNOSIS — Z01.812 PRE-PROCEDURAL LABORATORY EXAMINATIONS: ICD-10-CM

## 2024-02-11 LAB
ANION GAP SERPL CALC-SCNC: 7 MMOL/L (ref 0–18)
BASOPHILS # BLD AUTO: 0.04 X10(3) UL (ref 0–0.2)
BASOPHILS NFR BLD AUTO: 0.7 %
BUN BLD-MCNC: 54 MG/DL (ref 9–23)
CALCIUM BLD-MCNC: 7.9 MG/DL (ref 8.5–10.1)
CHLORIDE SERPL-SCNC: 98 MMOL/L (ref 98–112)
CO2 SERPL-SCNC: 33 MMOL/L (ref 21–32)
CREAT BLD-MCNC: 4.82 MG/DL
EGFRCR SERPLBLD CKD-EPI 2021: 9 ML/MIN/1.73M2 (ref 60–?)
EOSINOPHIL # BLD AUTO: 0.5 X10(3) UL (ref 0–0.7)
EOSINOPHIL NFR BLD AUTO: 8.3 %
ERYTHROCYTE [DISTWIDTH] IN BLOOD BY AUTOMATED COUNT: 16.5 %
FASTING STATUS PATIENT QL REPORTED: YES
GLUCOSE BLD-MCNC: 104 MG/DL (ref 70–99)
HCT VFR BLD AUTO: 34.4 %
HGB BLD-MCNC: 11.8 G/DL
IMM GRANULOCYTES # BLD AUTO: 0.01 X10(3) UL (ref 0–1)
IMM GRANULOCYTES NFR BLD: 0.2 %
LYMPHOCYTES # BLD AUTO: 1.25 X10(3) UL (ref 1–4)
LYMPHOCYTES NFR BLD AUTO: 20.7 %
MCH RBC QN AUTO: 37.7 PG (ref 26–34)
MCHC RBC AUTO-ENTMCNC: 34.3 G/DL (ref 31–37)
MCV RBC AUTO: 109.9 FL
MONOCYTES # BLD AUTO: 0.96 X10(3) UL (ref 0.1–1)
MONOCYTES NFR BLD AUTO: 15.9 %
NEUTROPHILS # BLD AUTO: 3.29 X10 (3) UL (ref 1.5–7.7)
NEUTROPHILS # BLD AUTO: 3.29 X10(3) UL (ref 1.5–7.7)
NEUTROPHILS NFR BLD AUTO: 54.2 %
OSMOLALITY SERPL CALC.SUM OF ELEC: 301 MOSM/KG (ref 275–295)
PLATELET # BLD AUTO: 133 10(3)UL (ref 150–450)
POTASSIUM SERPL-SCNC: 4.4 MMOL/L (ref 3.5–5.1)
RBC # BLD AUTO: 3.13 X10(6)UL
SODIUM SERPL-SCNC: 138 MMOL/L (ref 136–145)
WBC # BLD AUTO: 6.1 X10(3) UL (ref 4–11)

## 2024-02-11 PROCEDURE — 36415 COLL VENOUS BLD VENIPUNCTURE: CPT

## 2024-02-11 PROCEDURE — 80048 BASIC METABOLIC PNL TOTAL CA: CPT

## 2024-02-11 PROCEDURE — 73718 MRI LOWER EXTREMITY W/O DYE: CPT | Performed by: PODIATRIST

## 2024-02-11 PROCEDURE — 85025 COMPLETE CBC W/AUTO DIFF WBC: CPT

## 2024-02-12 ENCOUNTER — TELEPHONE (OUTPATIENT)
Facility: LOCATION | Age: 79
End: 2024-02-12

## 2024-02-12 NOTE — H&P
[facility]    PATIENT'S NAME: JEFFREY FLORES   ATTENDING PHYSICIAN: Oleg Addison M.D.   PATIENT ACCOUNT#:   [id number]    LOCATION:    MEDICAL RECORD #:   [id number]       YOB: 1945  ADMISSION DATE:       02/13/2024    HISTORY AND PHYSICAL EXAMINATION    HISTORY OF PRESENT ILLNESS:  A 78-year-old white female who was seen initially in the office with her daughter, Heather, who has ulceration of the right foot on the dorsum and the plantar aspect, has worsened.  She has been followed by Colten Ba DPM, who recommended future surgery for this.  The patient has known superficial femoral artery, popliteal artery, and tibial vessel occlusive disease.  Last angiogram done showed diffuse disease of the superficial femoral artery, popliteal artery, and severe narrowing of the distal tibioperoneal trunk and peroneal artery, and the peroneal artery to be somewhat small in size.  The patient has a problem with C difficile, has had diarrhea, continues to have diarrhea.  She said the last study showed that there was no C difficile.  She is being followed by Dr. London, Gastroenterology, from [place], who had recommended giving her some medication that is nonantibiotic for treatment.  Her primary doctor is Dr. Deng Roman.  She is undergoing dialysis by Dr. Keith.  The patient has dialysis on Monday, Wednesday, and Friday.  She currently has no hematuria, dysuria, hemoptysis, cough, sputum production.  No weight loss.  No fever or chills.  No hematemesis.  No bright blood per rectum.     PAST MEDICAL HISTORY:  Insulin-dependent diabetic, hypertension, dyslipidemia, carries a diagnosis of leukemia, currently in remission.  Being followed by Dr. Nichol Riojas from [facility].    PAST SURGICAL HISTORY:  She has a past history of open-heart surgery 10 years ago, vein taken from the right leg, at [facility], and had a previous right carotid endarterectomy with a bovine patch done  by me back in 2022.  Past history of PTCA of the coronary arteries.  She is currently being followed by Dr. Oleg Grady from [facility].    MEDICATIONS:  She is on atorvastatin, aspirin, allopurinol, carvedilol, Plavix, and imatinib.    ALLERGIES:  The patient has an allergy to epinephrine.     FAMILY HISTORY:  Father  from an MI.  Mother  from pancreatic cancer at 79.      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  She smoked for 40 years.  No EtOH abuse or drug abuse.  Currently not smoking.      REVIEW OF SYSTEMS:  Denies any CVA, TIA, visual field defect, or aphasia.      PHYSICAL EXAMINATION:    GENERAL:  She is awake, alert.  She is appropriate, no acute distress.  VITAL SIGNS:  Blood pressure 136/70, heart rate 72, respirations 20.  LUNGS:  Clear to auscultation.  HEART:  Normal, without murmur.  ABDOMEN:  Soft.  No tenderness or masses.   EXTREMITIES:  She has a fistula functioning in the left upper extremity placed about 5 years ago by Dr. Sears I think at [facility].  Right and left femoral pulses were palpable.  Popliteal and pedal pulses diminished bilaterally.  A gangrenous ulcer seen on the right foot.  Some mild dependent rubor of the right foot.  Difficult to tell if any infection.  The area was probed but no gross pus or fluctuance was seen.  No gross swelling.      MRI was pending for her.     Duplex ultrasound showed good triphasic waveforms going to the femoral arteries bilaterally but significant dampening across the right thigh.  The anterior tibial artery is open on the left side.  The right side posterior tibial artery and anterior tibial were completely occluded.  She has a peroneal artery running off with monophasic waveform at the dorsal pedal artery.    ASSESSMENT AND PLAN:  Patient is insulin-dependent diabetic, hypertension, dyslipidemia, end-stage renal disease, past history of smoking, previous coronary artery  bypass surgery and intervention, with gangrene of the right foot, with right superficial femoral artery, popliteal, and tibial vessel occlusive disease.  I have recommended left femoral artery access and right lower leg angiogram and angioplasty.  She is aware of the possibility for bypass.  However, I do not now if she has a good vein for this.  Dr. Grady has been texted for the cardiac evaluation prior to any surgery, but will schedule her for diagnostic angiogram and possible angioplasty of the right superficial femoral artery, popliteal artery, and peroneal artery.  She is aware of the risks of this procedure including death, myocardial infarction, bleeding, infection, limb loss, graft thrombosis, future limb loss, future graft thrombosis, distal embolization, arterial injury, worsening renal function, cardiac arrest, and death.  Risks and complications of not doing the procedure were also explained.  She is aware of the possibility for primary amputation.  She wishes to have some type of intervention performed.  We will try this first because I do not know if she has a good vein, and we will see what the results are and follow with ultrasound.  She is aware she will have to have foot surgery in the future but that will not be done at the same time.  I recommend she follow with Dr. London regarding history of C difficile.  All questions were answered.  The patient had an MRI, and it suggested some early osteomyelitis of the second metatarsal toe that was done over the weekend.      Dictated By Oleg Addison M.D.  d: 02/12/2024 10:36:42  t: 02/12/2024 11:06:16  [facility] [id number]  JJW/

## 2024-02-12 NOTE — TELEPHONE ENCOUNTER
There are concerns for early osteomyelitis to the area of exposed bone.  We will discuss further treatment options pending vascular recommendations.  Thank you

## 2024-02-12 NOTE — TELEPHONE ENCOUNTER
Patients daughter would like to the results of the MRI today, since the patient will be having a angioplasty done tomorrow morning.

## 2024-02-13 ENCOUNTER — HOSPITAL ENCOUNTER (INPATIENT)
Dept: INTERVENTIONAL RADIOLOGY/VASCULAR | Facility: HOSPITAL | Age: 79
LOS: 3 days | Discharge: HOME | End: 2024-02-16
Attending: SURGERY | Admitting: SURGERY
Payer: MEDICARE

## 2024-02-13 DIAGNOSIS — I73.9 RIGHT LEG CLAUDICATION (HCC): ICD-10-CM

## 2024-02-13 DIAGNOSIS — B99.9 INFECTION: ICD-10-CM

## 2024-02-13 DIAGNOSIS — I99.8 ISCHEMIC PAIN OF FOOT, RIGHT: Primary | ICD-10-CM

## 2024-02-13 DIAGNOSIS — M79.671 ISCHEMIC PAIN OF FOOT, RIGHT: Primary | ICD-10-CM

## 2024-02-13 DIAGNOSIS — I96 GANGRENE OF RIGHT FOOT (HCC): ICD-10-CM

## 2024-02-13 PROBLEM — M86.9 OSTEOMYELITIS OF RIGHT FOOT (HCC): Status: ACTIVE | Noted: 2023-01-01

## 2024-02-13 PROBLEM — I70.201 GANGRENE OF RIGHT LOWER EXTREMITY DUE TO ATHEROSCLEROSIS (HCC): Status: ACTIVE | Noted: 2024-01-01

## 2024-02-13 PROBLEM — I70.201 GANGRENE OF RIGHT LOWER EXTREMITY DUE TO ATHEROSCLEROSIS (HCC): Status: ACTIVE | Noted: 2024-02-13

## 2024-02-13 PROBLEM — M86.9 OSTEOMYELITIS OF RIGHT FOOT (HCC): Status: ACTIVE | Noted: 2023-09-28

## 2024-02-13 LAB
ALBUMIN SERPL-MCNC: 2.9 G/DL (ref 3.4–5)
ALBUMIN/GLOB SERPL: 0.9 {RATIO} (ref 1–2)
ALP LIVER SERPL-CCNC: 89 U/L
ALT SERPL-CCNC: 21 U/L
ANION GAP SERPL CALC-SCNC: 10 MMOL/L (ref 0–18)
ANION GAP SERPL CALC-SCNC: 6 MMOL/L (ref 0–18)
APTT PPP: 27.7 SECONDS (ref 23.3–35.6)
AST SERPL-CCNC: 35 U/L (ref 15–37)
BILIRUB SERPL-MCNC: 0.9 MG/DL (ref 0.1–2)
BUN BLD-MCNC: 40 MG/DL (ref 9–23)
BUN BLD-MCNC: 41 MG/DL (ref 9–23)
CALCIUM BLD-MCNC: 8.1 MG/DL (ref 8.5–10.1)
CALCIUM BLD-MCNC: 8.5 MG/DL (ref 8.5–10.1)
CHLORIDE SERPL-SCNC: 99 MMOL/L (ref 98–112)
CHLORIDE SERPL-SCNC: 99 MMOL/L (ref 98–112)
CO2 SERPL-SCNC: 28 MMOL/L (ref 21–32)
CO2 SERPL-SCNC: 33 MMOL/L (ref 21–32)
CREAT BLD-MCNC: 4.31 MG/DL
CREAT BLD-MCNC: 4.61 MG/DL
EGFRCR SERPLBLD CKD-EPI 2021: 10 ML/MIN/1.73M2 (ref 60–?)
EGFRCR SERPLBLD CKD-EPI 2021: 9 ML/MIN/1.73M2 (ref 60–?)
ERYTHROCYTE [DISTWIDTH] IN BLOOD BY AUTOMATED COUNT: 15.9 %
FASTING STATUS PATIENT QL REPORTED: YES
GLOBULIN PLAS-MCNC: 3.4 G/DL (ref 2.8–4.4)
GLUCOSE BLD-MCNC: 100 MG/DL (ref 70–99)
GLUCOSE BLD-MCNC: 86 MG/DL (ref 70–99)
HCT VFR BLD AUTO: 35.9 %
HGB BLD-MCNC: 12.2 G/DL
INR BLD: 1.18 (ref 0.8–1.2)
ISTAT ACTIVATED CLOTTING TIME: 260 SECONDS (ref 74–137)
MCH RBC QN AUTO: 38.6 PG (ref 26–34)
MCHC RBC AUTO-ENTMCNC: 34 G/DL (ref 31–37)
MCV RBC AUTO: 113.6 FL
OSMOLALITY SERPL CALC.SUM OF ELEC: 293 MOSM/KG (ref 275–295)
OSMOLALITY SERPL CALC.SUM OF ELEC: 296 MOSM/KG (ref 275–295)
PLATELET # BLD AUTO: 116 10(3)UL (ref 150–450)
POTASSIUM SERPL-SCNC: 3.8 MMOL/L (ref 3.5–5.1)
POTASSIUM SERPL-SCNC: 3.9 MMOL/L (ref 3.5–5.1)
PROT SERPL-MCNC: 6.3 G/DL (ref 6.4–8.2)
PROTHROMBIN TIME: 15.1 SECONDS (ref 11.6–14.8)
RBC # BLD AUTO: 3.16 X10(6)UL
SODIUM SERPL-SCNC: 137 MMOL/L (ref 136–145)
SODIUM SERPL-SCNC: 138 MMOL/L (ref 136–145)
WBC # BLD AUTO: 7.1 X10(3) UL (ref 4–11)

## 2024-02-13 PROCEDURE — B41F1ZZ FLUOROSCOPY OF RIGHT LOWER EXTREMITY ARTERIES USING LOW OSMOLAR CONTRAST: ICD-10-PCS | Performed by: SURGERY

## 2024-02-13 PROCEDURE — B41C1ZZ FLUOROSCOPY OF PELVIC ARTERIES USING LOW OSMOLAR CONTRAST: ICD-10-PCS | Performed by: SURGERY

## 2024-02-13 PROCEDURE — 047T34Z DILATION OF RIGHT PERONEAL ARTERY WITH DRUG-ELUTING INTRALUMINAL DEVICE, PERCUTANEOUS APPROACH: ICD-10-PCS | Performed by: SURGERY

## 2024-02-13 PROCEDURE — 99223 1ST HOSP IP/OBS HIGH 75: CPT | Performed by: HOSPITALIST

## 2024-02-13 PROCEDURE — 047M34Z DILATION OF RIGHT POPLITEAL ARTERY WITH DRUG-ELUTING INTRALUMINAL DEVICE, PERCUTANEOUS APPROACH: ICD-10-PCS | Performed by: SURGERY

## 2024-02-13 PROCEDURE — 047K341 DILATION OF RIGHT FEMORAL ARTERY WITH DRUG-ELUTING INTRALUMINAL DEVICE, USING DRUG-COATED BALLOON, PERCUTANEOUS APPROACH: ICD-10-PCS | Performed by: SURGERY

## 2024-02-13 RX ORDER — CLOPIDOGREL BISULFATE 75 MG/1
TABLET ORAL
Status: COMPLETED
Start: 2024-02-13 | End: 2024-02-13

## 2024-02-13 RX ORDER — ASPIRIN 81 MG/1
81 TABLET ORAL DAILY
Status: DISCONTINUED | OUTPATIENT
Start: 2024-02-13 | End: 2024-02-13 | Stop reason: SDUPTHER

## 2024-02-13 RX ORDER — HEPARIN SODIUM 5000 [USP'U]/ML
5000 INJECTION, SOLUTION INTRAVENOUS; SUBCUTANEOUS EVERY 12 HOURS SCHEDULED
Status: DISCONTINUED | OUTPATIENT
Start: 2024-02-13 | End: 2024-02-16

## 2024-02-13 RX ORDER — CINACALCET 30 MG/1
30 TABLET, FILM COATED ORAL
Status: DISCONTINUED | OUTPATIENT
Start: 2024-02-14 | End: 2024-02-16

## 2024-02-13 RX ORDER — POLYETHYLENE GLYCOL 3350 17 G/17G
17 POWDER, FOR SOLUTION ORAL DAILY PRN
Status: DISCONTINUED | OUTPATIENT
Start: 2024-02-13 | End: 2024-02-16

## 2024-02-13 RX ORDER — IODIXANOL 320 MG/ML
150 INJECTION, SOLUTION INTRAVASCULAR
Status: COMPLETED | OUTPATIENT
Start: 2024-02-13 | End: 2024-02-13

## 2024-02-13 RX ORDER — CALCIUM ACETATE 667 MG/1
1 CAPSULE ORAL
Status: DISCONTINUED | OUTPATIENT
Start: 2024-02-13 | End: 2024-02-16

## 2024-02-13 RX ORDER — SODIUM CHLORIDE 9 MG/ML
INJECTION, SOLUTION INTRAVENOUS CONTINUOUS
Status: DISCONTINUED | OUTPATIENT
Start: 2024-02-13 | End: 2024-02-13

## 2024-02-13 RX ORDER — HEPARIN SODIUM 5000 [USP'U]/ML
INJECTION, SOLUTION INTRAVENOUS; SUBCUTANEOUS
Status: COMPLETED
Start: 2024-02-13 | End: 2024-02-13

## 2024-02-13 RX ORDER — FENOFIBRATE 67 MG/1
67 CAPSULE ORAL
Status: DISCONTINUED | OUTPATIENT
Start: 2024-02-13 | End: 2024-02-16

## 2024-02-13 RX ORDER — ACETAMINOPHEN 325 MG/1
650 TABLET ORAL EVERY 4 HOURS PRN
Status: DISCONTINUED | OUTPATIENT
Start: 2024-02-13 | End: 2024-02-16

## 2024-02-13 RX ORDER — MIDAZOLAM HYDROCHLORIDE 1 MG/ML
INJECTION INTRAMUSCULAR; INTRAVENOUS
Status: DISCONTINUED
Start: 2024-02-13 | End: 2024-02-13 | Stop reason: WASHOUT

## 2024-02-13 RX ORDER — ALLOPURINOL 300 MG/1
300 TABLET ORAL DAILY
Status: DISCONTINUED | OUTPATIENT
Start: 2024-02-14 | End: 2024-02-16

## 2024-02-13 RX ORDER — BISACODYL 10 MG
10 SUPPOSITORY, RECTAL RECTAL
Status: DISCONTINUED | OUTPATIENT
Start: 2024-02-13 | End: 2024-02-16

## 2024-02-13 RX ORDER — LOPERAMIDE HCL 1 MG/7.5ML
2 SOLUTION ORAL 3 TIMES DAILY PRN
COMMUNITY

## 2024-02-13 RX ORDER — CLOPIDOGREL BISULFATE 75 MG/1
75 TABLET ORAL DAILY
Status: DISCONTINUED | OUTPATIENT
Start: 2024-02-13 | End: 2024-02-13

## 2024-02-13 RX ORDER — HYDROCODONE BITARTRATE AND ACETAMINOPHEN 5; 325 MG/1; MG/1
1 TABLET ORAL EVERY 4 HOURS PRN
Status: DISCONTINUED | OUTPATIENT
Start: 2024-02-13 | End: 2024-02-16

## 2024-02-13 RX ORDER — MIDAZOLAM HYDROCHLORIDE 1 MG/ML
INJECTION INTRAMUSCULAR; INTRAVENOUS
Status: COMPLETED
Start: 2024-02-13 | End: 2024-02-13

## 2024-02-13 RX ORDER — CEFAZOLIN SODIUM/WATER 2 G/20 ML
2 SYRINGE (ML) INTRAVENOUS EVERY 24 HOURS
Status: DISCONTINUED | OUTPATIENT
Start: 2024-02-13 | End: 2024-02-16

## 2024-02-13 RX ORDER — HYDROCODONE BITARTRATE AND ACETAMINOPHEN 5; 325 MG/1; MG/1
2 TABLET ORAL EVERY 4 HOURS PRN
Status: DISCONTINUED | OUTPATIENT
Start: 2024-02-13 | End: 2024-02-16

## 2024-02-13 RX ORDER — SENNOSIDES 8.6 MG
17.2 TABLET ORAL NIGHTLY PRN
Status: DISCONTINUED | OUTPATIENT
Start: 2024-02-13 | End: 2024-02-16

## 2024-02-13 RX ORDER — ASPIRIN 81 MG/1
81 TABLET ORAL DAILY
Status: DISCONTINUED | OUTPATIENT
Start: 2024-02-13 | End: 2024-02-16

## 2024-02-13 RX ORDER — CLOPIDOGREL BISULFATE 75 MG/1
75 TABLET ORAL DAILY
Status: DISCONTINUED | OUTPATIENT
Start: 2024-02-13 | End: 2024-02-16

## 2024-02-13 RX ORDER — ATORVASTATIN CALCIUM 10 MG/1
10 TABLET, FILM COATED ORAL NIGHTLY
Status: DISCONTINUED | OUTPATIENT
Start: 2024-02-13 | End: 2024-02-16

## 2024-02-13 RX ORDER — LIDOCAINE HYDROCHLORIDE 10 MG/ML
INJECTION, SOLUTION EPIDURAL; INFILTRATION; INTRACAUDAL; PERINEURAL
Status: COMPLETED
Start: 2024-02-13 | End: 2024-02-13

## 2024-02-13 RX ORDER — ONDANSETRON 2 MG/ML
4 INJECTION INTRAMUSCULAR; INTRAVENOUS EVERY 6 HOURS PRN
Status: DISCONTINUED | OUTPATIENT
Start: 2024-02-13 | End: 2024-02-16

## 2024-02-13 RX ORDER — CARVEDILOL 6.25 MG/1
6.25 TABLET ORAL 2 TIMES DAILY WITH MEALS
Status: DISCONTINUED | OUTPATIENT
Start: 2024-02-13 | End: 2024-02-16

## 2024-02-13 RX ORDER — PANTOPRAZOLE SODIUM 40 MG/1
40 TABLET, DELAYED RELEASE ORAL
Status: DISCONTINUED | OUTPATIENT
Start: 2024-02-13 | End: 2024-02-16

## 2024-02-13 RX ADMIN — ATORVASTATIN CALCIUM 10 MG: 10 TABLET, FILM COATED ORAL at 20:23:00

## 2024-02-13 RX ADMIN — CARVEDILOL 6.25 MG: 6.25 TABLET ORAL at 17:12:00

## 2024-02-13 RX ADMIN — SODIUM CHLORIDE: 9 INJECTION, SOLUTION INTRAVENOUS at 07:15:00

## 2024-02-13 RX ADMIN — CALCIUM ACETATE 667 MG: 667 CAPSULE ORAL at 17:58:00

## 2024-02-13 RX ADMIN — CLOPIDOGREL BISULFATE 75 MG: 75 TABLET ORAL at 17:13:00

## 2024-02-13 RX ADMIN — IODIXANOL 160 ML: 320 INJECTION, SOLUTION INTRAVASCULAR at 09:55:00

## 2024-02-13 RX ADMIN — CEFAZOLIN SODIUM/WATER 2 G: 2 G/20 ML SYRINGE (ML) INTRAVENOUS at 17:14:00

## 2024-02-13 RX ADMIN — ASPIRIN 81 MG: 81 TABLET ORAL at 17:11:00

## 2024-02-13 NOTE — PROCEDURES
[facility]    PATIENT'S NAME: JEFFREY FLORES   ATTENDING PHYSICIAN: Oleg Addison M.D.   OPERATING PHYSICIAN: Oleg Addison M.D.   PATIENT ACCOUNT#:   [id number]    LOCATION:  [place] [address]  MEDICAL RECORD #:   [id number]       YOB: 1945  ADMISSION DATE:       02/13/2024      OPERATION DATE:  02/13/2024    CARDIAC PROCEDURE TRANSCRIPTION    PERIPHERAL ANGIOGRAPHY/PERCUTANEOUS PERIPHERAL INTERVENTION     PREOPERATIVE DIAGNOSIS:  Gangrene of right foot with right superficial femoral artery, popliteal artery, tibioperoneal trunk, peroneal artery occlusive disease with complete occlusion of the anterior tibial artery, posterior tibial artery.  POSTOPERATIVE DIAGNOSIS:  Gangrene of right foot with right superficial femoral artery, popliteal artery, tibioperoneal trunk, peroneal artery occlusive disease with complete occlusion of the anterior tibial artery, posterior tibial artery.  PROCEDURE PERFORMED:    1.   Duplex ultrasound access left common femoral artery with micropuncture technique with retrograde angiogram of the common femoral artery through a 4-Paraguayan sheath.  2.   End-hole catheter angiogram of the right common iliac artery through a Crossover catheter of the common, external, and internal iliac artery and femoral artery which was normal.  3.   Placement of a 6-Paraguayan 55 cm long Deuce sheath into the right superficial femoral artery with anticoagulation over a 0.035 [place] Crosslead wire.  4.   Angiogram of the end-hole catheter in the proximal right superficial femoral artery of the femoral artery, superficial femoral artery, popliteal, tibial vessels, and plantar arch.  5.   Placement of a 0.014 Runthrough guidewire down to the distal peroneal artery going through a chronic total occlusion of the proximal peroneal artery, distal tibioperoneal trunk.    6.   Balloon angioplasty of the right peroneal artery using a 2 x 220 mm balloon angioplasty for a little bit over 2  minutes.  7.   Balloon angioplasty of the right peroneal artery, tibioperoneal trunk with a 2 x 2.5 mm tapered balloon angioplasty for at least 2 to 2-1/2 minutes.  8.   Balloon angioplasty of the right peroneal artery, tibioperoneal trunk with a 2.5 x 150 mm balloon angioplasty 2 separate areas for at least 2 to 2-1/2 minutes.  9.   Balloon angioplasty, noncompliant balloon, of the right popliteal, tibioperoneal trunk with a [place] balloon for at least 3 minutes.    10.   Drug-coated balloon angioplasty using In.Pact balloon 4 x 100 of the right popliteal artery, tibioperoneal trunk for at least 3 to 3-1/2 minutes.  11.   Balloon angioplasty, drug-coated balloon, Lutonix, of 6 x 100 mm of the right superficial femoral artery, inflated above nominal pressures, as were the other ones, too.  12.   Final end-hole angiogram from the right superficial femoral artery of the right lower leg showing wide patency of superficial femoral artery, popliteal artery, tibioperoneal trunk, peroneal artery going into the plantar arch with incomplete plantar arch using the dorsal pedal artery but still occlusion of the anterior tibial artery, posterior tibial artery.  13.   Removal of the sheath from the left femoral artery with the use of a 6-Indonesian Angio-Seal with good hemostasis.    INDICATIONS:  This is a 78-year-old white female, end-stage renal disease, I have known from previous carotid surgery who has gangrene of the right forefoot and had previous stent to the right superficial femoral artery at [facility], now came in with gangrene of the right foot.  She has hemodynamic stenosis of the area of the stent but also has significant infrapopliteal artery occlusive disease.  Progressively has worsened from the last time she had been seen.  She is recommended for endovascular procedure.  I do not think she had a real good target vessel for bypass surgery, and she had paucity of vein.  Risks and complications including  death, myocardial infarction, bleeding, infection, limb loss, graft thrombosis, future limb loss, future graft thrombosis, recurrent stenosis, multisystem organ failure, sepsis, arterial injury, distal embolization, all were explained.  The possibility of patency of the repair and still loss of the limb was also explained.  The patient understood and agreed.  All questions answered.    DESCRIPTION OF PROCEDURE:  The patient placed supine on procedure table in the catheterization laboratory, underwent IV conscious sedation with continuous monitoring of blood pressure, heart rate, pulse oximetry by an RN.  The patient received 4 mg of Versed, 150 mg of fentanyl.  Both groins were prepped and draped in usual sterile technique.  Procedure started at 0747 and ended at 0953.  The patient had a total of 150 mL of contrast with a fluoro time of 24.4 minutes and DAP [id number].    After time-out was done and the left groin was imaged with fluoroscopy and ultrasound, the left femoral artery was puncture above the bifurcation in the midportion with a micropuncture technique and then with placement of 0.014 micropuncture wire, a 4-Bahraini sheath.  Angiogram was done retrograde showing it was in the femoral artery.  The incision was enlarged slightly and then a 3 mm J Terumo wire was placed.  With the use of a Crossover catheter, it was placed into the right superficial femoral artery.  Could not get a 6-Bahraini Deuce sheath across this area, and she had been fully anticoagulated at this time but needed the help additionally of a Crossover catheter then with a Glidecath that was placed over the wire into the superficial femoral artery with this being advanced into that area.  Had a hard time still advancing this and so a 0.035 Crosslead [place] guidewire was placed into the superficial femoral artery and 6-Bahraini sheath with the dilator moved back and forth was able to advance over the bifurcation that was severely calcified going into  the superficial femoral artery proximally.  ACTs were monitored to make they were over 250 seconds throughout the procedure.    Angiogram was done with end-hole catheter at this area.  The initial angiogram was done with end-hole catheter and the Crossover at the common iliac artery, external iliac artery, and internal iliac artery.    This showed a stenosis of the distal superficial femoral artery near the adductor canal just above a stent that was about 50% to 60%.  The patient had severe disease of the tibioperoneal trunk.  There was a chronic total occlusion of the origin of the peroneal artery distal to the tibioperoneal trunk and very a attenuated peroneal artery distally.  No flow seen in the foot.    The patient had a 0.014 Runthrough with the use of a Corsair, was able to pass through the chronic total occlusion down the peroneal artery, down to the area of the branch of the peroneal artery with the Corsair over this to dilate it slightly.  The patient then had a 2 x 220 mm balloon angioplasty placed down to the area of the distal peroneal artery and inflated slowly slightly above nominal pressures of 2.1 to 2.5 mm.  This was done for over 2 to 2-1/2 minutes, taken down.  Then, a tapered 2 x 2.5 mm x 220 balloon angioplasty was done, again in the same area, again slowly inflated.  After this was completed, the patient was noted to have a little bit more stenosis of the popliteal artery which became more defined distally, and I decided to go up with a 2.5 x 150 mm balloon of the peroneal artery done in 2 separate areas going to the peroneal artery and then into the peroneal artery, tibioperoneal trunk.    At the completion of this, the patient then had a 4 x 100 balloon.  [place] was then ballooned in the area of the popliteal artery, tibioperoneal trunk, and the patient then had a 4 x 120 drug-coated In.Pact balloon angioplasty done in the same area, inflated for at least 3 minutes.  Completion of the  angioplasty at this area now showed a widely patent popliteal artery, tibioperoneal trunk, and peroneal artery going to the foot.    Angiogram was done and evaluated the superficial femoral artery through the end-hole sheath that was still in the superficial femoral proximally, and it did show that 40% to 60% percent stenosis of the superficial femoral artery just above the stent is severely calcified circumferentially around the artery so, therefore, it was decided just to do a drug-coated balloon, Lutonix, 6 x 100, that was inflated just above nominal pressures for over 3 minutes in this area.  Completion angiogram showed now this was widely open.  There was no waist on the balloon.  Imaging distally showed no distal embolization with good flow into the area of the foot.  Doppler flow at the end of the procedure show excellent flow in the dorsal pedal artery.    At the completion of this procedure, the patient had the 0.035 Crosslead wire placed back into the superficial femoral artery through the sheath and the sheath was advanced across back retrograde to the aortic bifurcation and the wire was placed into the area of the suprarenal aorta.  The sheath was withdrawn.  The area was re-prepped with ChloraPrep, and a 6-Syriac Angio-Seal was placed in the artery and was closed with good hemostasis obtained.  The area was re-prepped again with ChloraPrep.  Appropriate dressing was applied.  The patient's vital signs remained stable throughout the procedure.  Neurologically, she was intact at the end of procedure.  Good Doppler flow in the foot.   Her daughter, Heather, was called at home at [phone].  The patient is to be admitted to the hospital due to end-stage renal disease with amount of contrast and a diagnosis recently of osteomyelitis of the right foot and evaluation by Podiatry.     Dictated By Oleg Addison M.D.  d: 02/13/2024 10:18:52  t: 02/13/2024 10:50:21  [facility] [id number]  JJW/    cc:   Oleg Grady

## 2024-02-13 NOTE — PLAN OF CARE
Patient arrived in room via wheelchair brought up by . Patient's daughter @ bedside, standing on the opposite side of the bed. Patient proceeded to get out of wheelchair on her own and fell sideways and rolled onto the floor. Patient denies hitting her head. She stated that she got caught up in the wheelchair. Right foot in walking boot, left foot is in a velcro surgical shoe. Patient states that the walking boot is uncomfortable. 2 RN's and Tech helped patient up off of the floor and sit on the side of the bed. Dr. Addison aware and at bedside. Patient ready for peripheral angiogram.

## 2024-02-13 NOTE — OPERATIVE REPORT
Pre-op Dx:P Gangrene vright foot. Post-op Dx: Same. Procedure: Right SFA PTA [id number]. Right popliteal/ tibial-peroneal trunk 4x100 CVoyoter/ [id number]. right peroneal/ Tibial-peroneal PTA 2x220/ 2-2.5x220/ 2.5x150 times 2. Surgeon: Azael. 150cc contrast/ 6Fr Angioseal left CFA

## 2024-02-13 NOTE — PLAN OF CARE
Patient had peripheral angiogram with Dr. Addison today. Patient is being admitted but awaiting bed assignment. Left groin site soft, CDI, no hematoma. + doppler bilateral pedal and PT pulses. VSS. Patient denies any pain. Patient tolerating po intake. Patient's daughter updated over the phone. @ 1400, small amount of oozing noted from left groin. Dressing taken down, manual pressure held x 15 minutes. Site is soft, small amount of ecchymosis noted. Quick clot dressing in place. Report called to PHILLIP Bustamante. Patient transferred to [facility] by bed with belongings. Bedside groin  check completed with oncoming Robby FISHER. Patient's daughter Heather aware of transfer.

## 2024-02-13 NOTE — CONSULTS
INFECTIOUS DISEASE CONSULTATION    Maggie Pelletier Patient Status:  Observation    1945 MRN [id number]   Location [facility] 8NE-A Attending Oleg Addison MD   Hosp Day # 0 PCP LUZ MARIA BLACKBURN       Requested by Dr. Addison    Reason for Consultation:  Osteomyelitis right foot    History of Present Illness:  Maggie Pelletier is a a(n) 78 year old female admitted sp vascular procedure.  Prior to admission MRI right foot OM 2nd MT.  She has previous 1st, 2nd toe amputation, there is ulceration that probes from base foot and another dorsum that may communicate.  Previous cultures 2024 staph lugdunensis     History:  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 (HCC)     Coronary atherosclerosis     Dialysis patient (HCC)     High cholesterol     History of blood transfusion     Leukemia (HCC)     Renal disorder     Right foot ulcer, with fat layer exposed (HCC)      Past Surgical History:   Procedure Laterality Date    APPENDECTOMY      AV FISTULA OR GRAFT ARTERIAL Left     CABG      CATH PERCUTANEOUS  TRANSLUMINAL CORONARY ANGIOPLASTY      CHOLECYSTECTOMY      OTHER Right     big toe amputation     Family History   Problem Relation Age of Onset    Heart Disorder Father     Cancer Mother       reports that she has quit smoking. She has never used smokeless tobacco. She reports that she does not currently use alcohol. She reports that she does not use drugs.      Allergies:  Allergies   Allergen Reactions    Epinephrine OTHER (SEE COMMENTS)     Dental epi. Pain for 2 weeks after dental procedure.       Medications:    Current Facility-Administered Medications:     sodium chloride 0.9% infusion, , Intravenous, Continuous    ondansetron (Zofran) 4 MG/2ML injection 4 mg, 4 mg, Intravenous, Q6H PRN    sodium chloride 0.9% infusion, , Intravenous, Continuous    heparin (Porcine) 5000 UNIT/ML injection  5,000 Units, 5,000 Units, Subcutaneous, 2 times per day    acetaminophen (Tylenol) tab 650 mg, 650 mg, Oral, Q4H PRN **OR** HYDROcodone-acetaminophen (Norco) 5-325 MG per tab 1 tablet, 1 tablet, Oral, Q4H PRN **OR** HYDROcodone-acetaminophen (Norco) 5-325 MG per tab 2 tablet, 2 tablet, Oral, Q4H PRN    aspirin DR tab 81 mg, 81 mg, Oral, Daily    clopidogrel (Plavix) tab 75 mg, 75 mg, Oral, Daily    polyethylene glycol (PEG 3350) (Miralax) 17 g oral packet 17 g, 17 g, Oral, Daily PRN    sennosides (Senokot) tab 17.2 mg, 17.2 mg, Oral, Nightly PRN    bisacodyl (Dulcolax) 10 MG rectal suppository 10 mg, 10 mg, Rectal, Daily PRN    pantoprazole (Protonix) 40 mg in sodium chloride 0.9% PF 10 mL IV push, 40 mg, Intravenous, QAM AC **OR** pantoprazole (Protonix) DR tab 40 mg, 40 mg, Oral, QAM AC    allopurinol (Zyloprim) tab 300 mg, 300 mg, Oral, Daily    aspirin DR tab 81 mg, 81 mg, Oral, Daily    atorvastatin (Lipitor) tab 10 mg, 10 mg, Oral, Nightly    calcium acetate (Phoslo) cap 667 mg, 1 capsule, Oral, TID CC    carvedilol (Coreg) tab 6.25 mg, 6.25 mg, Oral, BID with meals    clopidogrel (Plavix) tab 75 mg, 75 mg, Oral, Daily    fenofibrate micronized (Lofibra) cap 67 mg, 67 mg, Oral, Daily with breakfast  No current facility-administered medications on file prior to encounter.     Current Outpatient Medications on File Prior to Encounter   Medication Sig Dispense Refill    gentamicin 0.1 % External Ointment Apply 1 Application topically 2 (two) times daily. 30 g 0    HYDROcodone-acetaminophen 5-325 MG Oral Tab Take 1 tablet by mouth every 4 (four) hours as needed. 15 tablet 0    acetaminophen 325 MG Oral Tab Take 1 tablet (325 mg total) by mouth every 6 (six) hours as needed for Pain.      allopurinol 300 MG Oral Tab Take 1 tablet (300 mg total) by mouth daily.      aspirin 81 MG Oral Tab EC Take 1 tablet (81 mg total) by mouth daily.      atorvastatin 10 MG Oral Tab Take 1 tablet (10 mg total) by mouth nightly.       calcium acetate 667 MG Oral Cap Take 1 capsule (667 mg total) by mouth 3 (three) times daily with meals.      cinacalcet 30 MG Oral Tab Take 1 tablet (30 mg total) by mouth daily with breakfast.      clopidogrel 75 MG Oral Tab Take 1 tablet (75 mg total) by mouth daily.      fenofibrate 145 MG Oral Tab Take 1 tablet (145 mg total) by mouth daily.      imatinib 100 MG Oral Tab Take 3 tablets (300 mg total) by mouth daily.      Magnesium 500 MG Oral Tab Take by mouth.      cyanocobalamine 1000 MCG Oral Tab Take 1 tablet (1,000 mcg total) by mouth daily.      carvedilol 6.25 MG Oral Tab Take 1 tablet (6.25 mg total) by mouth 2 (two) times daily with meals. Per patient, nephrologist decreased to once daily.      [] cefdinir 300 MG Oral Cap Take 1 capsule (300 mg total) by mouth every other day for 10 days. Take capsule after dialysis treatment 5 capsule 0       Review of Systems:    A comprehensive 10 point review of systems was completed.  Pertinent positives and negatives noted in the the HPI.      Physical Exam:    General: No acute distress. Alert and oriented x 3.  Vital signs: Temp:  [97.8 °F (36.6 °C)-98.1 °F (36.7 °C)] 97.8 °F (36.6 °C)  Pulse:  [66-80] 75  Resp:  [2-21] 17  BP: ()/() 96/48  SpO2:  [87 %-100 %] 98 %  Body mass index is 28.85 kg/m².  HEENT: Moist mucous membranes. Extraocular muscles are intact.  Neck: No swelling, no masses  Respiratory: Non labored, symmetric exursion  Cardiovascular: No irregularities in rhythm  Abdomen: Soft, nontender, nondistended.    Musculoskeletal: right foot deep ulcer probes through, fistula tract site of previous toe amp    Laboratory Data:  Laboratory data reviewed      Recent Labs   Lab 24  1003   RBC 3.13*   HGB 11.8*   HCT 34.4*   .9*   MCH 37.7*   MCHC 34.3   RDW 16.5   NEPRELIM 3.29   WBC 6.1   .0*       Recent Labs   Lab 24  1003 24  0709   * 100*   BUN 54* 40*   CREATSERUM 4.82* 4.31*   CA 7.9* 8.5     138   K 4.4 3.9   CL 98 99   CO2 33.0* 33.0*              Established Problem list:  Patient Active Problem List   Diagnosis    Gangrene of toe of right foot (HCC)    Ischemic pain of foot, right    PAD (peripheral artery disease) (HCC)    Great toe amputation status, right    Gangrene of right foot (HCC)    ESRD (end stage renal disease) (HCC)    Primary hypertension    Anemia in ESRD (end-stage renal disease)  (HCC)    Carotid stenosis, right    Osteomyelitis, unspecified site, unspecified type (HCC)    Ulcer of right foot, unspecified ulcer stage (HCC)    CLL (chronic lymphocytic leukemia) (HCC)    Status post amputation of lesser toe of right foot (HCC)    Gangrene of right lower extremity due to atherosclerosis (HCC)       ASSESSMENT/PLAN:  1. Osteomyelitis right 2nd MT  Open wound probes through, previous 1st/2ndt toe amputation  -podiatry following  Blood cultures, iv cefazolin        Chele Crowe MD, MD [facility]  [phone]

## 2024-02-13 NOTE — CONSULTS
Cardiology Consultation    Maggie Pelletier Patient Status:  Observation    1945 MRN [id number]   Location [facility] 8NE-A Attending Oleg Addison MD   Hosp Day # 0 PCP LUZ MARIA BLACKBURN     Reason for Consultation:  Post op management      History of Present Illness:  Maggie Pelletier is a a(n) 78 year old female. Nice woman, vasculopath with ESRD, remote CABG and more recent TAVR, PVD with prior right CEA, and non healing wound with previous right toe amputation.  She underwent extensive percutaneous of the RLE today.  She is being followed by ID and podiatry for his osteomyelitis.  Cardiac wise, no cp or dyspnea.  Echo in Dec 2023 showed preserved EF and normal AVR.    History:  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 (HCC)     Coronary atherosclerosis     Dialysis patient (HCC)     High cholesterol     History of blood transfusion     Leukemia (HCC)     Renal disorder     Right foot ulcer, with fat layer exposed (HCC)      Past Surgical History:   Procedure Laterality Date    APPENDECTOMY      AV FISTULA OR GRAFT ARTERIAL Left     CABG      CATH PERCUTANEOUS  TRANSLUMINAL CORONARY ANGIOPLASTY      CHOLECYSTECTOMY      OTHER Right     big toe amputation     Family History   Problem Relation Age of Onset    Heart Disorder Father     Cancer Mother          Allergies:  Allergies   Allergen Reactions    Epinephrine OTHER (SEE COMMENTS)     Dental epi. Pain for 2 weeks after dental procedure.       Medications:   heparin  5,000 Units Subcutaneous 2 times per day    clopidogrel  75 mg Oral Daily    pantoprazole  40 mg Intravenous QAM AC    Or    pantoprazole  40 mg Oral QAM AC    [START ON 2024] allopurinol  300 mg Oral Daily    aspirin  81 mg Oral Daily    atorvastatin  10 mg Oral Nightly    calcium acetate  1 capsule Oral TID CC    carvedilol  6.25 mg Oral BID with meals    clopidogrel  75 mg Oral Daily    fenofibrate micronized  67 mg  Oral Daily with breakfast    ceFAZolin  2 g Intravenous Q24H       Continuous Infusions:      Social History:   reports that she has quit smoking. She has never used smokeless tobacco. She reports that she does not currently use alcohol. She reports that she does not use drugs.    Review of Systems:  All systems were reviewed and are negative except as described above in HPI.    Physical Exam:      Temp:  [97.8 °F (36.6 °C)-98.1 °F (36.7 °C)] 97.8 °F (36.6 °C)  Pulse:  [66-80] 75  Resp:  [2-21] 17  BP: ()/() 96/48  SpO2:  [87 %-100 %] 98 %    Last 3 Weights   02/13/24 1534 147 lb 11.3 oz (67 kg)   02/08/24 1512 145 lb (65.8 kg)   10/04/23 0600 144 lb (65.3 kg)   09/29/23 0040 138 lb (62.6 kg)   09/28/23 1826 138 lb (62.6 kg)   08/31/23 1502 138 lb (62.6 kg)           General: No apparent distress  HEENT: No focal deficits.  Neck: supple. JVP normal  Cardiac: Regular rhythm, S1, S2 normal,   No rub or gallop.  No anthony murmur.  Lungs: CTA  Abdomen: Soft, non-tender.   Extremities: No edema  Neurologic: no focal deficits  Skin: Warm and dry.          Telemetry:     Laboratories and Data:  Diagnostics:    EKG, 2/13/2024:  NSR, RBBB, NSSTTW changes    CXR, 2/13/2024:      Labs:   HEM:  Recent Labs   Lab 02/11/24  1003   WBC 6.1   HGB 11.8*   .0*       Chem:  Recent Labs   Lab 02/11/24  1003 02/13/24  0709    138   K 4.4 3.9   CL 98 99   CO2 33.0* 33.0*   BUN 54* 40*   CREATSERUM 4.82* 4.31*   CA 7.9* 8.5   * 100*       No results for input(s): \"ALT\", \"AST\", \"ALB\", \"AMYLASE\", \"LIPASE\", \"LDH\" in the last 168 hours.    Invalid input(s): \"ALPHOS\", \"TBIL\", \"DBIL\", \"TPROT\"    No results for input(s): \"PTP\", \"INR\" in the last 168 hours.    No results for input(s): \"TROP\", \"CK\" in the last 168 hours.      Impression:   Severe PVD with osteo right foot, extensive percutaneous revascularization on 2/13/24.  CAD with remote CABG  S/p TAVR 2022 at [facility].  Last echo 12/23 looked good,  preserved EF.  ESRD, on HD  CLL  Remote CEA    Plan:  Resume same cardiac meds as on admit.  Will follow with you.    Tirso Torres MD  2/13/2024  4:44 PM  C5

## 2024-02-14 ENCOUNTER — APPOINTMENT (OUTPATIENT)
Dept: ULTRASOUND IMAGING | Facility: HOSPITAL | Age: 79
End: 2024-02-14
Attending: SURGERY
Payer: MEDICARE

## 2024-02-14 PROBLEM — L97.514 RIGHT FOOT ULCER, WITH NECROSIS OF BONE (HCC): Status: ACTIVE | Noted: 2024-01-01

## 2024-02-14 PROBLEM — L97.514 RIGHT FOOT ULCER, WITH NECROSIS OF BONE (HCC): Status: ACTIVE | Noted: 2024-02-14

## 2024-02-14 LAB
ANION GAP SERPL CALC-SCNC: 7 MMOL/L (ref 0–18)
ATRIAL RATE: 77 BPM
BUN BLD-MCNC: 53 MG/DL (ref 9–23)
CALCIUM BLD-MCNC: 7.7 MG/DL (ref 8.5–10.1)
CHLORIDE SERPL-SCNC: 100 MMOL/L (ref 98–112)
CO2 SERPL-SCNC: 27 MMOL/L (ref 21–32)
CREAT BLD-MCNC: 5.74 MG/DL
EGFRCR SERPLBLD CKD-EPI 2021: 7 ML/MIN/1.73M2 (ref 60–?)
ERYTHROCYTE [DISTWIDTH] IN BLOOD BY AUTOMATED COUNT: 16.2 %
GLUCOSE BLD-MCNC: 157 MG/DL (ref 70–99)
HCT VFR BLD AUTO: 29.4 %
HGB BLD-MCNC: 10.5 G/DL
MCH RBC QN AUTO: 39.3 PG (ref 26–34)
MCHC RBC AUTO-ENTMCNC: 35.7 G/DL (ref 31–37)
MCV RBC AUTO: 110.1 FL
OSMOLALITY SERPL CALC.SUM OF ELEC: 296 MOSM/KG (ref 275–295)
P AXIS: -12 DEGREES
P-R INTERVAL: 242 MS
PLATELET # BLD AUTO: 106 10(3)UL (ref 150–450)
POTASSIUM SERPL-SCNC: 4.1 MMOL/L (ref 3.5–5.1)
Q-T INTERVAL: 448 MS
QRS DURATION: 128 MS
QTC CALCULATION (BEZET): 506 MS
R AXIS: 128 DEGREES
RBC # BLD AUTO: 2.67 X10(6)UL
SODIUM SERPL-SCNC: 134 MMOL/L (ref 136–145)
T AXIS: -8 DEGREES
VENTRICULAR RATE: 77 BPM
WBC # BLD AUTO: 7.7 X10(3) UL (ref 4–11)

## 2024-02-14 PROCEDURE — 93926 LOWER EXTREMITY STUDY: CPT | Performed by: SURGERY

## 2024-02-14 PROCEDURE — 99222 1ST HOSP IP/OBS MODERATE 55: CPT | Performed by: INTERNAL MEDICINE

## 2024-02-14 PROCEDURE — 5A1D70Z PERFORMANCE OF URINARY FILTRATION, INTERMITTENT, LESS THAN 6 HOURS PER DAY: ICD-10-PCS | Performed by: INTERNAL MEDICINE

## 2024-02-14 PROCEDURE — 99232 SBSQ HOSP IP/OBS MODERATE 35: CPT | Performed by: HOSPITALIST

## 2024-02-14 RX ORDER — LIDOCAINE AND PRILOCAINE 25; 25 MG/G; MG/G
CREAM TOPICAL
Status: COMPLETED | OUTPATIENT
Start: 2024-02-14 | End: 2024-02-14

## 2024-02-14 RX ORDER — MIDODRINE HYDROCHLORIDE 5 MG/1
10 TABLET ORAL
Qty: 2 TABLET | Refills: 0 | Status: COMPLETED | OUTPATIENT
Start: 2024-02-14 | End: 2024-02-14

## 2024-02-14 RX ADMIN — ALLOPURINOL 300 MG: 300 TABLET ORAL at 14:25:00

## 2024-02-14 RX ADMIN — CALCIUM ACETATE 667 MG: 667 CAPSULE ORAL at 08:00:00

## 2024-02-14 RX ADMIN — CALCIUM ACETATE 667 MG: 667 CAPSULE ORAL at 15:44:00

## 2024-02-14 RX ADMIN — CEFAZOLIN SODIUM/WATER 2 G: 2 G/20 ML SYRINGE (ML) INTRAVENOUS at 16:54:00

## 2024-02-14 RX ADMIN — CALCIUM ACETATE 667 MG: 667 CAPSULE ORAL at 19:30:00

## 2024-02-14 RX ADMIN — FENOFIBRATE 67 MG: 67 CAPSULE ORAL at 14:25:00

## 2024-02-14 RX ADMIN — ACETAMINOPHEN 650 MG: 325 TABLET ORAL at 02:43:00

## 2024-02-14 RX ADMIN — ACETAMINOPHEN 650 MG: 325 TABLET ORAL at 08:40:00

## 2024-02-14 RX ADMIN — CARVEDILOL 6.25 MG: 6.25 TABLET ORAL at 16:53:00

## 2024-02-14 RX ADMIN — ATORVASTATIN CALCIUM 10 MG: 10 TABLET, FILM COATED ORAL at 21:46:00

## 2024-02-14 RX ADMIN — CLOPIDOGREL BISULFATE 75 MG: 75 TABLET ORAL at 14:25:00

## 2024-02-14 RX ADMIN — HYDROCODONE BITARTRATE AND ACETAMINOPHEN 1 TABLET: 5; 325 TABLET ORAL at 16:53:00

## 2024-02-14 RX ADMIN — CINACALCET 30 MG: 30 TABLET, FILM COATED ORAL at 14:25:00

## 2024-02-14 RX ADMIN — MIDODRINE HYDROCHLORIDE 10 MG: 5 TABLET ORAL at 09:15:00

## 2024-02-14 RX ADMIN — ASPIRIN 81 MG: 81 TABLET ORAL at 14:25:00

## 2024-02-14 RX ADMIN — LIDOCAINE AND PRILOCAINE: 25; 25 CREAM TOPICAL at 09:29:00

## 2024-02-14 RX ADMIN — PANTOPRAZOLE SODIUM 40 MG: 40 TABLET, DELAYED RELEASE ORAL at 06:08:00

## 2024-02-14 NOTE — PLAN OF CARE
Assumed care of pt around 1930. Remains on room air with O2 saturation > 90%. Requiring 2L O2 with sleep. Denies shortness of breath. Diminished lung sounds. Non-productive cough noted. NSR on tele, denies cardiac symptoms. Pt's blood pressures running low, diastolic 28-35, MCI notified. Oliguric, continent of bowel. L groin site, soft, no hematoma felt. Ecchymosis present to the area. Pt c/o intermittent pain to R leg from when she fell, minimal relief with PRN medication. Bedrest orders for POD 0, ambulates standby with walker. Pt and family updated on plan of care and agreeable. Fall precautions in place. Call light within reach.   Plan: dialysis MWF, IV ancef, wound care to see, podiatry/nephrology to see      Problem: CARDIOVASCULAR - ADULT  Goal: Maintains optimal cardiac output and hemodynamic stability  Description: INTERVENTIONS:  - Monitor vital signs, rhythm, and trends  - Monitor for bleeding, hypotension and signs of decreased cardiac output  - Evaluate effectiveness of vasoactive medications to optimize hemodynamic stability  - Monitor arterial and/or venous puncture sites for bleeding and/or hematoma  - Assess quality of pulses, skin color and temperature  - Assess for signs of decreased coronary artery perfusion - ex. Angina  - Evaluate fluid balance, assess for edema, trend weights  Outcome: Progressing  Goal: Absence of cardiac arrhythmias or at baseline  Description: INTERVENTIONS:  - Continuous cardiac monitoring, monitor vital signs, obtain 12 lead EKG if indicated  - Evaluate effectiveness of antiarrhythmic and heart rate control medications as ordered  - Initiate emergency measures for life threatening arrhythmias  - Monitor electrolytes and administer replacement therapy as ordered  Outcome: Progressing     Problem: RESPIRATORY - ADULT  Goal: Achieves optimal ventilation and oxygenation  Description: INTERVENTIONS:  - Assess for changes in respiratory status  - Assess for changes in mentation  and behavior  - Position to facilitate oxygenation and minimize respiratory effort  - Oxygen supplementation based on oxygen saturation or ABGs  - Provide Smoking Cessation handout, if applicable  - Encourage broncho-pulmonary hygiene including cough, deep breathe, Incentive Spirometry  - Assess the need for suctioning and perform as needed  - Assess and instruct to report SOB or any respiratory difficulty  - Respiratory Therapy support as indicated  - Manage/alleviate anxiety  - Monitor for signs/symptoms of CO2 retention  Outcome: Progressing     Problem: SKIN/TISSUE INTEGRITY - ADULT  Goal: Skin integrity remains intact  Description: INTERVENTIONS  - Assess and document risk factors for pressure ulcer development  - Assess and document skin integrity  - Monitor for areas of redness and/or skin breakdown  - Initiate interventions, skin care algorithm/standards of care as needed  Outcome: Progressing  Goal: Incision(s), wounds(s) or drain site(s) healing without S/S of infection  Description: INTERVENTIONS:  - Assess and document risk factors for pressure ulcer development  - Assess and document skin integrity  - Assess and document dressing/incision, wound bed, drain sites and surrounding tissue  - Implement wound care per orders  - Initiate isolation precautions as appropriate  - Initiate Pressure Ulcer prevention bundle as indicated  Outcome: Progressing     Problem: Patient/Family Goals  Goal: Patient/Family Long Term Goal  Description: Patient's Long Term Goal: To go home     Interventions:  - Monitor L groin site  - See additional Care Plan goals for specific interventions  Outcome: Progressing  Goal: Patient/Family Short Term Goal  Description: Patient's Short Term Goal: feel better    Interventions:   - pain control, hemodialysis  - See additional Care Plan goals for specific interventions  Outcome: Progressing

## 2024-02-14 NOTE — CONSULTS
[facility]  Report of Consultation    Maggie Pelletier Patient Status:  Inpatient    1945 MRN [id number]   Location [facility] 8NE-A Attending Oleg Addison MD   Hosp Day # 1 PCP LUZ MARIA BLACKBURN     Reason for Consultation:  ESRD on HD    History of Present Illness:  Maggie Pelletier is a a(n) 78 year old woman known to our service with mult med probs incl ESRD due to HTN on HD MWF at [facility] adm after RLE PAD intervention    History:  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 (HCC)     Coronary atherosclerosis     Dialysis patient (HCC)     High cholesterol     History of blood transfusion     Leukemia (HCC)     Renal disorder     Right foot ulcer, with fat layer exposed (HCC)      Past Surgical History:   Procedure Laterality Date    APPENDECTOMY      AV FISTULA OR GRAFT ARTERIAL Left     CABG      CATH PERCUTANEOUS  TRANSLUMINAL CORONARY ANGIOPLASTY      CHOLECYSTECTOMY      OTHER Right     big toe amputation     Family History   Problem Relation Age of Onset    Heart Disorder Father     Cancer Mother       reports that she has quit smoking. She has never used smokeless tobacco. She reports that she does not currently use alcohol. She reports that she does not use drugs.    Allergies:  Allergies   Allergen Reactions    Epinephrine OTHER (SEE COMMENTS)     Dental epi. Pain for 2 weeks after dental procedure.       Medications:    Current Facility-Administered Medications:     ondansetron (Zofran) 4 MG/2ML injection 4 mg, 4 mg, Intravenous, Q6H PRN    heparin (Porcine) 5000 UNIT/ML injection 5,000 Units, 5,000 Units, Subcutaneous, 2 times per day    acetaminophen (Tylenol) tab 650 mg, 650 mg, Oral, Q4H PRN **OR** HYDROcodone-acetaminophen (Norco) 5-325 MG per tab 1 tablet, 1 tablet, Oral, Q4H PRN **OR** HYDROcodone-acetaminophen (Norco) 5-325 MG per tab 2 tablet, 2 tablet, Oral, Q4H PRN    clopidogrel (Plavix) tab 75 mg, 75 mg,  Oral, Daily    polyethylene glycol (PEG 3350) (Miralax) 17 g oral packet 17 g, 17 g, Oral, Daily PRN    sennosides (Senokot) tab 17.2 mg, 17.2 mg, Oral, Nightly PRN    bisacodyl (Dulcolax) 10 MG rectal suppository 10 mg, 10 mg, Rectal, Daily PRN    pantoprazole (Protonix) 40 mg in sodium chloride 0.9% PF 10 mL IV push, 40 mg, Intravenous, QAM AC **OR** pantoprazole (Protonix) DR tab 40 mg, 40 mg, Oral, QAM AC    allopurinol (Zyloprim) tab 300 mg, 300 mg, Oral, Daily    aspirin DR tab 81 mg, 81 mg, Oral, Daily    atorvastatin (Lipitor) tab 10 mg, 10 mg, Oral, Nightly    calcium acetate (Phoslo) cap 667 mg, 1 capsule, Oral, TID CC    carvedilol (Coreg) tab 6.25 mg, 6.25 mg, Oral, BID with meals    fenofibrate micronized (Lofibra) cap 67 mg, 67 mg, Oral, Daily with breakfast    ceFAZolin (Ancef) 2 g in 20mL IV syringe premix, 2 g, Intravenous, Q24H    cinacalcet (Sensipar) tab 30 mg, 30 mg, Oral, Daily with breakfast  No current outpatient medications on file.       Review of Systems:  Denies fever/chills  Denies wt loss/gain  Denies HA or visual changes  Denies CP or palpitations  Denies SOB/cough/hemoptysis  Denies abd or flank pain  Denies N/V/D  Denies change in urinary habits or gross hematuria  Denies LE edema  + R foot pain      Physical Exam:   /32 (BP Location: Right arm)   Pulse 69   Temp 97.7 °F (36.5 °C) (Oral)   Resp 18   Ht 5' (1.524 m)   Wt 147 lb 11.3 oz (67 kg)   SpO2 98%   BMI 28.85 kg/m²   Temp (24hrs), Av.4 °F (36.9 °C), Min:97.7 °F (36.5 °C), Max:100 °F (37.8 °C)       Intake/Output Summary (Last 24 hours) at 2024 0900  Last data filed at 2024 0803  Gross per 24 hour   Intake 590 ml   Output 0 ml   Net 590 ml     Last 3 Weights   24 1724 147 lb 11.3 oz (67 kg)   24 1534 147 lb 11.3 oz (67 kg)   24 1512 145 lb (65.8 kg)   10/04/23 0600 144 lb (65.3 kg)   23 0040 138 lb (62.6 kg)   23 1826 138 lb (62.6 kg)   23 1502 138 lb (62.6 kg)    03/19/22 0600 163 lb 5.8 oz (74.1 kg)   03/18/22 0400 165 lb (74.8 kg)   03/07/22 1508 165 lb (74.8 kg)     General: Alert and oriented in no apparent distress.  HEENT: No scleral icterus, MMM  Neck: Supple, no JOE or thyromegaly  Cardiac: Regular rate and rhythm, S1, S2 normal, no murmur or rub  Lungs: Clear without wheezes, rales, rhonchi.    Abdomen: Soft, non-tender. + bowel sounds, no palpable organomegaly  Extremities: Without clubbing, cyanosis or edema. L arm AVF with bruit/thrill  Neurologic: moving all extremities  Skin: Warm and dry      Laboratory Data:  Lab Results   Component Value Date    WBC 7.7 02/14/2024    HGB 10.5 02/14/2024    HCT 29.4 02/14/2024    .0 02/14/2024    CREATSERUM 4.61 02/13/2024    BUN 41 02/13/2024     02/13/2024    K 3.8 02/13/2024    CL 99 02/13/2024    CO2 28.0 02/13/2024    GLU 86 02/13/2024    CA 8.1 02/13/2024    ALB 2.9 02/13/2024    ALKPHO 89 02/13/2024    BILT 0.9 02/13/2024    TP 6.3 02/13/2024    AST 35 02/13/2024    ALT 21 02/13/2024    PTT 27.7 02/13/2024    INR 1.18 02/13/2024    PTP 15.1 02/13/2024       BUN (mg/dL)   Date Value   02/13/2024 41 (H)   02/13/2024 40 (H)   02/11/2024 54 (H)     Creatinine (mg/dL)   Date Value   02/13/2024 4.61 (H)   02/13/2024 4.31 (H)   02/11/2024 4.82 (H)       No results found for: \"MICROALBCREA\"    Recent Labs   Lab 02/11/24  1003 02/13/24  1658 02/14/24  0524   WBC 6.1 7.1 7.7   HGB 11.8* 12.2 10.5*   .9* 113.6* 110.1*   .0* 116.0* 106.0*   INR  --  1.18  --        Recent Labs   Lab 02/11/24  1003 02/13/24  0709 02/13/24  1658    138 137   K 4.4 3.9 3.8   CL 98 99 99   CO2 33.0* 33.0* 28.0   BUN 54* 40* 41*   CREATSERUM 4.82* 4.31* 4.61*   CA 7.9* 8.5 8.1*   * 100* 86       Recent Labs   Lab 02/13/24  1658   ALT 21   AST 35   ALB 2.9*       No results for input(s): \"PGLU\" in the last 168 hours.            Impression/Plan:    #1.  ESRD- due to HTN.  HD today per usual routine    #2.   Anemiia- due to ESRD. CHAVEZ for goal hgb 10-11 gms    #3.  PAD- s/p extensive intervention.  Per vascular with ongoing multispecialty care    Thank you for allowing me to participate in the care of your patient. Please do not hesitate to call with any questions or concerns.       Steve Howell MD  2/14/2024  9:00 AM

## 2024-02-14 NOTE — CONSULTS
[place] HOSPITALIST  CONSULT     Maggie Pelletier Patient Status:  Observation    1945 MRN [id number]   Location [facility] 8NE-A Attending Oleg Addison MD   Hosp Day # 0 PCP LUZ MARIA BLACKBURN     Reason for consult: HTN  Requested by: Dr Addison     History of Present Illness: Maggie Pelletier is a 78 year old female with s/p angiogram. No specific complaints.   Patient denies chest pain, SOB, cough, fevers, chills, abdominal pain, nausea or vomiting    Review of Systems:   A comprehensive 14 point review of systems was completed.  Pertinent positives and negatives noted in the HPI.  Past Medical History:  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 (HCC)     Coronary atherosclerosis     Dialysis patient (HCC)     High cholesterol     History of blood transfusion     Leukemia (HCC)     Renal disorder     Right foot ulcer, with fat layer exposed (HCC)       Past Surgical History:   Past Surgical History:   Procedure Laterality Date    APPENDECTOMY      AV FISTULA OR GRAFT ARTERIAL Left     CABG      CATH PERCUTANEOUS  TRANSLUMINAL CORONARY ANGIOPLASTY      CHOLECYSTECTOMY      OTHER Right     big toe amputation     Social History:  reports that she has quit smoking. She has never used smokeless tobacco. She reports that she does not currently use alcohol. She reports that she does not use drugs.  Family History:   Family History   Problem Relation Age of Onset    Heart Disorder Father     Cancer Mother      Allergies:   Allergies   Allergen Reactions    Epinephrine OTHER (SEE COMMENTS)     Dental epi. Pain for 2 weeks after dental procedure.     Medications:    No current facility-administered medications on file prior to encounter.     Current Outpatient Medications on File Prior to Encounter   Medication Sig Dispense Refill    loperamide 1 MG/7.5ML Oral Solution Take 15 mL (2 mg total) by mouth 3 (three) times daily as needed for Diarrhea.       gentamicin 0.1 % External Ointment Apply 1 Application topically 2 (two) times daily. 30 g 0    HYDROcodone-acetaminophen 5-325 MG Oral Tab Take 1 tablet by mouth every 4 (four) hours as needed. 15 tablet 0    acetaminophen 325 MG Oral Tab Take 1 tablet (325 mg total) by mouth every 6 (six) hours as needed for Pain.      allopurinol 300 MG Oral Tab Take 1 tablet (300 mg total) by mouth daily.      aspirin 81 MG Oral Tab EC Take 1 tablet (81 mg total) by mouth daily.      atorvastatin 10 MG Oral Tab Take 1 tablet (10 mg total) by mouth nightly.      calcium acetate 667 MG Oral Cap Take 1 capsule (667 mg total) by mouth 3 (three) times daily with meals.      cinacalcet 30 MG Oral Tab Take 1 tablet (30 mg total) by mouth daily with breakfast.      clopidogrel 75 MG Oral Tab Take 1 tablet (75 mg total) by mouth daily.      fenofibrate 145 MG Oral Tab Take 1 tablet (145 mg total) by mouth daily.      imatinib 100 MG Oral Tab Take 3 tablets (300 mg total) by mouth daily.      Magnesium 500 MG Oral Tab Take by mouth.      cyanocobalamine 1000 MCG Oral Tab Take 1 tablet (1,000 mcg total) by mouth daily.      carvedilol 6.25 MG Oral Tab Take 1 tablet (6.25 mg total) by mouth 2 (two) times daily with meals. Per patient, nephrologist decreased to once daily.         Physical Exam:    /33 (BP Location: Right arm)   Pulse 80   Temp 100 °F (37.8 °C) (Oral)   Resp 17   Ht 5' (1.524 m)   Wt 147 lb 11.3 oz (67 kg)   SpO2 96%   BMI 28.85 kg/m²   General: No acute distress. Alert and oriented.  Neck: Supple   Respiratory: Clear to auscultation bilaterally. No wheezes. No rhonchi.  Cardiovascular: S1, S2. Regular rhythm, regular rate.   Abdomen: Soft, nontender, nondistended.  Positive bowel sounds.  Neurologic: CNII-XII grossly intact. No acute focal neurological deficits.  Musculoskeletal: Moves all extremities.  Extremities: No edema or cyanosis.  Integument: OM and erythema R foot- demarcated erythema, felicia holes  performed- +discharge     Diagnostic Data:    Labs:  Recent Labs   Lab 02/11/24  1003 02/13/24  1658   WBC 6.1 7.1   HGB 11.8* 12.2   .9* 113.6*   .0* 116.0*   INR  --  1.18     Recent Labs   Lab 02/11/24  1003 02/13/24  0709 02/13/24  1658   * 100* 86   BUN 54* 40* 41*   CREATSERUM 4.82* 4.31* 4.61*   CA 7.9* 8.5 8.1*   ALB  --   --  2.9*    138 137   K 4.4 3.9 3.8   CL 98 99 99   CO2 33.0* 33.0* 28.0   ALKPHO  --   --  89   AST  --   --  35   ALT  --   --  21   BILT  --   --  0.9   TP  --   --  6.3*     Recent Labs   Lab 02/13/24  1658   PTP 15.1*   INR 1.18     No results for input(s): \"TROP\", \"CK\" in the last 168 hours.  Imaging: Imaging data reviewed in Epic.  ASSESSMENT / PLAN:     # PAD s/p angiogram  -per Dr. Addison  -ASA, plavix, statin     #Osteomyelitis with cellulitis  -ID consulted  -Podiatry consulted  -empiric IV ABx  -f/u Blood Cx    #CAD s/p remote CABG  ESRD on HD- HD tomorrow- renal consulted  #CLL  #h/o CEA   #Chronic thrombocytopenia     Quality:  DVT Prophylaxis: heparin   CODE status: full  Plan of care discussed with patient, staff   Naveen De La Rosa MD  2/13/2024      The 21st Century Cures Act makes medical notes like these available to patients in the interest of transparency. Please be advised this is a medical document. Medical documents are intended to carry relevant information, facts as evident, and the clinical opinion of the practitioner. The medical note is intended as peer to peer communication and may appear blunt or direct. It is written in medical language and may contain abbreviations or verbiage that are unfamiliar.

## 2024-02-14 NOTE — PROGRESS NOTES
INFECTIOUS DISEASE  PROGRESS NOTE            [facility]    Maggie Pelletier Patient Status:  Inpatient    1945 MRN [id number]   [facility] 8NE-A Attending Oleg Addison MD   Hosp Day # 1 PCP LUZ MARIA BLACKBURN     Antibiotics: cefazoline    Subjective:  : comfortable    Objective:  Temp:  [97.7 °F (36.5 °C)-100 °F (37.8 °C)] 97.7 °F (36.5 °C)  Pulse:  [66-80] 69  Resp:  [2-21] 18  BP: ()/() 114/32  SpO2:  [83 %-100 %] 98 %    General: awake alert  Vital signs:Temp:  [97.7 °F (36.5 °C)-100 °F (37.8 °C)] 97.7 °F (36.5 °C)  Pulse:  [66-80] 69  Resp:  [2-21] 18  BP: ()/() 114/32  SpO2:  [83 %-100 %] 98 %  HEENT: Moist mucous membranes. Extraocular muscles are intact.  Neck: supple no masses  Respiratory: Non labored, symmetric excursion, normal respirations  Cardiovascular: no irregularities in rhythm  Abdomen: Soft, nontender, nondistended.   Musculoskeletal: foot dressed  Labs:     COVID-19 Lab Results    COVID-19  Lab Results   Component Value Date    COVID19 Not Detected 2022       Pro-Calcitonin  No results for input(s): \"PCT\" in the last 168 hours.    Cardiac  No results for input(s): \"TROP\", \"PBNP\" in the last 168 hours.    Creatinine Kinase  No results for input(s): \"CK\" in the last 168 hours.    Inflammatory Markers  No results for input(s): \"CRP\", \"ESTHER\", \"LDH\", \"DDIMER\" in the last 168 hours.    Recent Labs   Lab 24  1003 24  1658 24  0524   RBC 3.13*   < > 2.67*   HGB 11.8*   < > 10.5*   HCT 34.4*   < > 29.4*   .9*   < > 110.1*   MCH 37.7*   < > 39.3*   MCHC 34.3   < > 35.7   RDW 16.5   < > 16.2   NEPRELIM 3.29  --   --    WBC 6.1   < > 7.7   .0*   < > 106.0*    < > = values in this interval not displayed.         Recent Labs   Lab 24  1003 24  0709 24  1658   * 100* 86   BUN 54* 40* 41*   CREATSERUM 4.82* 4.31* 4.61*   CA 7.9* 8.5 8.1*   ALB  --   --  2.9*    138 137   K 4.4 3.9 3.8    CL 98 99 99   CO2 33.0* 33.0* 28.0   ALKPHO  --   --  89   AST  --   --  35   ALT  --   --  21   BILT  --   --  0.9   TP  --   --  6.3*           Problem list reviewed:  Patient Active Problem List   Diagnosis    Gangrene of toe of right foot (HCC)    Ischemic pain of foot, right    PAD (peripheral artery disease) (HCC)    Great toe amputation status, right    Gangrene of right foot (HCC)    ESRD (end stage renal disease) (HCC)    Primary hypertension    Anemia in ESRD (end-stage renal disease)  (HCC)    Carotid stenosis, right    Osteomyelitis of right foot (HCC)    Ulcer of right foot, unspecified ulcer stage (HCC)    CLL (chronic lymphocytic leukemia) (HCC)    Status post amputation of lesser toe of right foot (HCC)    Gangrene of right lower extremity due to atherosclerosis (HCC)             ASSESSMENT/PLAN:  1. Osteomyelitis right 2nd MT  Open wound probes through, previous 1st/2ndt toe amputation  \  -vascular procedure planned  -podiatry following, awaiting surgery  Adjust abx per micro      Chele Crowe MD, MD [facility]  [phone]

## 2024-02-14 NOTE — PROGRESS NOTES
02/13/24 2026 02/13/24 2027 02/13/24 2028   Oxygen Therapy   SpO2 (!) 87 % (!) 86 % (!) 86 %   O2 Device None (Room air) None (Room air) None (Room air)   O2 Flow Rate (L/min)  --   --   --       02/13/24 2030 02/13/24 2031 02/13/24 2032   Oxygen Therapy   SpO2 (!) 86 % (!) 83 % (!) 84 %   O2 Device None (Room air) None (Room air) None (Room air)   O2 Flow Rate (L/min)  --   --   --       02/13/24 2034   Oxygen Therapy   SpO2 96 %   O2 Device Nasal cannula   O2 Flow Rate (L/min) 2 L/min     Pt desating on room air, 2L O2 applied.

## 2024-02-14 NOTE — PLAN OF CARE
Assumed care of patient from [place] in cath lab. Patient received A&O x4, bed rest POD 0, per orders. Per patient at home uses walker. Patient is sinus rhythm, with PVCs Bundle branch block. On room air, lung sounds diminished in the bases.  L groin site, soft, dressing clean dry and intact, no hematoma, no bleeding. Patient arrived to floor with some ecchymosis to generalized area. Bilateral pedal pulses by doppler. Patient has no complaints of pain. L AV fistula. Thrill and bruit present. . Patient tolerating diet, bowel sound active. Per patient is oliguric, continent of bowel, fall precautions in place, bed alarm in use. All consults contacted and aware of patient.     Skin check: Sacral redness mepilex applied to sacrum, R 1st and second toe amputation, site covered with guaze and tape. Area of concern on L 5th toe, picture in flowsheets, wound care consult.  Skin check completed with PHILLIP DELCID

## 2024-02-14 NOTE — PROGRESS NOTES
No complaints- much improved doppler flow right DP- patient with incomplete plantar arch- discussed with Dr. Ba- plan podiatric surgery tomorrow- aware this might not heal- but not sure if any more vascular intervention can be done. CSA will cover 2/16-23/21

## 2024-02-14 NOTE — PROGRESS NOTES
[facility]     Hospitalist Progress Note     Maggie Pelletier Patient Status:  Observation    1945 MRN [id number]   Location [facility] 8NE-A Attending Oleg Addison MD   Hosp Day # 0 PCP LUZ MARIA BLACKBURN     Chief Complaint: HTN    Subjective:     Patient having pain this morning to right foot.  Denies fever, chills, n/v.  No cp or sob.  NO other acute complaints.      Objective:    Review of Systems:   A comprehensive review of systems was completed; pertinent positive and negatives stated in subjective.    Vital signs:  Temp:  [97.7 °F (36.5 °C)-100 °F (37.8 °C)] 97.7 °F (36.5 °C)  Pulse:  [66-80] 69  Resp:  [2-21] 18  BP: ()/() 114/32  SpO2:  [83 %-100 %] 98 %    Physical Exam:    General: No acute distress, pleasant   Respiratory: No wheezes, no rhonchi  Cardiovascular: S1, S2, regular rate and rhythm  Abdomen: Soft, Non-tender, non-distended, positive bowel sounds  Neuro: No new focal deficits.   Extremities: Erythema to right foot with + discharge         Diagnostic Data:    Labs:  Recent Labs   Lab 24  1003 24  1658 24  0524   WBC 6.1 7.1 7.7   HGB 11.8* 12.2 10.5*   .9* 113.6* 110.1*   .0* 116.0* 106.0*   INR  --  1.18  --        Recent Labs   Lab 24  1003 24  0709 24  1658   * 100* 86   BUN 54* 40* 41*   CREATSERUM 4.82* 4.31* 4.61*   CA 7.9* 8.5 8.1*   ALB  --   --  2.9*    138 137   K 4.4 3.9 3.8   CL 98 99 99   CO2 33.0* 33.0* 28.0   ALKPHO  --   --  89   AST  --   --  35   ALT  --   --  21   BILT  --   --  0.9   TP  --   --  6.3*       Estimated Creatinine Clearance: 7.2 mL/min (A) (based on SCr of 4.61 mg/dL (H)).    No results for input(s): \"TROP\", \"TROPHS\", \"CK\" in the last 168 hours.    Recent Labs   Lab 24  1658   PTP 15.1*   INR 1.18                  Microbiology    No results found for this visit on 24.      Imaging: Reviewed in Epic.    Medications:    heparin  5,000 Units Subcutaneous 2  times per day    clopidogrel  75 mg Oral Daily    pantoprazole  40 mg Intravenous QAM AC    Or    pantoprazole  40 mg Oral QAM AC    allopurinol  300 mg Oral Daily    aspirin  81 mg Oral Daily    atorvastatin  10 mg Oral Nightly    calcium acetate  1 capsule Oral TID CC    carvedilol  6.25 mg Oral BID with meals    fenofibrate micronized  67 mg Oral Daily with breakfast    ceFAZolin  2 g Intravenous Q24H    cinacalcet  30 mg Oral Daily with breakfast       Assessment & Plan:      # PAD s/p angiogram  -per Dr. Addison  -ASA, plavix, statin      #Osteomyelitis with cellulitis  -ID consulted  -Podiatry consulted  -empiric IV ABx  -f/u Blood Cx  -plan for surgery tomorrow with podiatry     #ESRD on HD - HD today     #CAD s/p remote CABG  ESRD on HD- HD tomorrow- renal consulted    #CLL  #h/o CEA   #Chronic thrombocytopenia       Steven Aquino MD    Supplementary Documentation:     Quality:  DVT Mechanical Prophylaxis:        DVT Pharmacologic Prophylaxis   Medication    heparin (Porcine) 5000 UNIT/ML injection 5,000 Units         DVT Pharmacologic prophylaxis: Aspirin 81 mg      Code Status: Full Code  Marin: No urinary catheter in place  Marin Duration (in days):   Central line:    WOLFGANG:     Discharge is dependent on: surgery outcome  At this point Ms. Pelletier is expected to be discharge to: Home?    The 21st Century Cures Act makes medical notes like these available to patients in the interest of transparency. Please be advised this is a medical document. Medical documents are intended to carry relevant information, facts as evident, and the clinical opinion of the practitioner. The medical note is intended as peer to peer communication and may appear blunt or direct. It is written in medical language and may contain abbreviations or verbiage that are unfamiliar.             **Certification      PHYSICIAN Certification of Need for Inpatient Hospitalization - Initial Certification    Patient will require inpatient services  that will reasonably be expected to span two midnight's based on the clinical documentation in H+P.   Based on patients current state of illness, I anticipate that, after discharge, patient will require TBD.

## 2024-02-14 NOTE — CONSULTS
[facility]  Report of Inpatient Wound Care Consultation    Maggie Pelletier Patient Status:  Inpatient    1945 MRN [id number]   Location [facility] 8NE-A Attending Oleg Addison MD   Hosp Day # 1 PCP LUZ MARIA BLACKBURN     Reason for Consultation:  R plantar foot    History of Present Illness:  Maggie Pelletier is a a(n) 78 year old female.  Patient with multiple comorbidities.    SUBJECTIVE:  I have had these for some time.        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 She has never used smokeless tobacco. She reports that she does not currently use alcohol. She reports that she does not use drugs.      Allergies:  @ALLERGY    Laboratory Data:    Recent Labs   Lab 24  1003 24  0709 24  1658 24  0524 24  1046   WBC 6.1  --  7.1 7.7  --    HGB 11.8*  --  12.2 10.5*  --    HCT 34.4*  --  35.9 29.4*  --    .0*  --  116.0* 106.0*  --    CREATSERUM 4.82* 4.31* 4.61*  --  5.74*   BUN 54* 40* 41*  --  53*   * 100* 86  --  157*   CA 7.9* 8.5 8.1*  --  7.7*   ALB  --   --  2.9*  --   --    TP  --   --  6.3*  --   --    PTT  --   --  27.7  --   --    INR  --   --  1.18  --   --          ASSESSMENT:  Wound 23 #2 Right plantar foot Foot Right;Plantar (Active)   Date First Assessed/Time First Assessed: 23 1546    Wound Number (Wound Clinic Only): #2 Right plantar foot  Primary Wound Type: Arterial Ulcer   Location: Foot  Wound Location Orientation: Right;Plantar      Assessments 2/14/2024  9:20 AM   Wound Image         Wound 11/06/23 #1 Right 2nd toe Right (Active)   Date First Assessed/Time First Assessed: 11/06/23 1546    Wound Number (Wound Clinic Only): #1 Right 2nd toe  Primary Wound Type: (c) Old surgical  Wound Location Orientation: Right      Assessments 2/14/2024  9:21 AM   Wound Image         Wound 02/13/24 Foot Dorsal;Left (Active)   Date First Assessed/Time First Assessed: 02/13/24 1756   Location: (c) Foot  Wound Location Orientation: Dorsal;Left  Wound Description (Comments): black ulcer      Assessments 2/14/2024  9:15 AM   Wound Image         Wound 02/14/24 Left;Plantar (Active)   Date First Assessed/Time First Assessed: 02/14/24 0920   Wound Location Orientation: Left;Plantar      Assessments 2/14/2024  9:20 AM   Wound Image        B feet warm, +dorsal pedal pulse noted.   L distal R plantar small toe with dry scab, not opened.  L medial dorsal foot with dry scab, no drainage.   R dorsal (1.4cm x 1.3cm x 1.5cm) with exposed adipose tissue, red tissue, slight odor.    R plantar (1.1cm x 1.2cm x 1.5cm) with exposed adipose tissue, red tissue, slight odor.    Cleansed and applied silver foam to all.    Plan to go to OR tomorrow for the R foot per patient report.      Wound Cleaning and Dressings:  Showering directions: May shower and/or cleanse wound with mild soap and water  Wound cleansing:  Cleanse with normal saline or wound cleanser  Wound cleaning frequency: Every 48 hours  Wound product: Silver foam  Dressing change frequency:  Change dressing every other day and/or as needed  Enzymatic agent:  Not applicable    Care Summary:  Care Summary: Discussed Plan of Care at beside with patient. Patient verbally acknowledges understanding of all instructions and all questions were answered.      Additional Notes:  Plan for OR tomorrow for debridement of the R foot.        Thank you for this consultation  and for allowing me to participate in the care of your patient.      Time Spent 45 Minutes.    Thank you,  Stefani Florez, PT, MPT  Wound Care Clinician  [facility]    2/14/2024  2:25 PM

## 2024-02-14 NOTE — PROGRESS NOTES
Progress Note  Maggie Pelletier Patient Status:  Inpatient    1945 MRN [id number]   Location [facility] 8NE-A Attending Oleg Addison MD   Hosp Day # 1 PCP LUZ MARIA BLACKBURN     Subjective:  Complains of left foot pain    Objective:  /32 (BP Location: Right arm)   Pulse 74   Temp 97.5 °F (36.4 °C) (Oral)   Resp 16   Ht 5' (1.524 m)   Wt 147 lb 11.3 oz (67 kg)   SpO2 98%   BMI 28.85 kg/m²     Telemetry: SR, RBBB      Intake/Output: +590        Last 3 Weights   24 1724 147 lb 11.3 oz (67 kg)   24 1534 147 lb 11.3 oz (67 kg)   24 1512 145 lb (65.8 kg)   10/04/23 0600 144 lb (65.3 kg)   23 0040 138 lb (62.6 kg)   23 1826 138 lb (62.6 kg)   23 1502 138 lb (62.6 kg)       Labs:  Recent Labs   Lab 24  0709 24  1658 24  1046   * 86 157*   BUN 40* 41* 53*   CREATSERUM 4.31* 4.61* 5.74*   EGFRCR 10* 9* 7*   CA 8.5 8.1* 7.7*   ALB  --  2.9*  --     137 134*   K 3.9 3.8 4.1   CL 99 99 100   CO2 33.0* 28.0 27.0   ALKPHO  --  89  --    AST  --  35  --    ALT  --  21  --    BILT  --  0.9  --    TP  --  6.3*  --      Recent Labs   Lab 24  1003 24  1658 24  0524   RBC 3.13* 3.16* 2.67*   HGB 11.8* 12.2 10.5*   HCT 34.4* 35.9 29.4*   .9* 113.6* 110.1*   MCH 37.7* 38.6* 39.3*   MCHC 34.3 34.0 35.7   RDW 16.5 15.9 16.2   NEPRELIM 3.29  --   --    WBC 6.1 7.1 7.7   .0* 116.0* 106.0*         No results for input(s): \"TROP\", \"TROPHS\", \"CK\" in the last 168 hours.  Lab Results   Component Value Date    INR 1.18 2024    INR 1.19 2022    INR 1.01 2022       Diagnostics:     Review of Systems   Constitutional: Positive for malaise/fatigue.   Cardiovascular: Negative.    Musculoskeletal:  Positive for joint pain.       Physical Exam:    Gen: Alert, oriented x 3, in no apparent distress  Heent: Pupils equal, reactive. Mucous membranes moist.   Cardiac: Regular rate and rhythm, normal S1,S2  Lungs:  Clear to auscultation  Abd: Soft, non tender, non distended  Ext: No edema  Skin: Warm, dry  Neuro: No focal deficits  Right groin soft, no hematoma      Medications:     heparin  5,000 Units Subcutaneous 2 times per day    clopidogrel  75 mg Oral Daily    pantoprazole  40 mg Intravenous QAM AC    Or    pantoprazole  40 mg Oral QAM AC    allopurinol  300 mg Oral Daily    aspirin  81 mg Oral Daily    atorvastatin  10 mg Oral Nightly    calcium acetate  1 capsule Oral TID CC    carvedilol  6.25 mg Oral BID with meals    fenofibrate micronized  67 mg Oral Daily with breakfast    ceFAZolin  2 g Intravenous Q24H    cinacalcet  30 mg Oral Daily with breakfast             Assessment:  Severe Pvd, right foot gangrene  2/13/24 angioplasty of the right peroneal artery, tibioperoneal trunk, R FSA per Dr. Addison   On ASA, plavix, statin   On iv antibiotics  CAD, remote CABG  On ASA, plavix, BB, statin   Hx of CEA  TAVR 2022  Preserved LVEF  Mean systolic gradient 13 mmhg  Moderate to severe MR, PASP 55 mmHg  ESRD on HD  CLL  Remote CEA  Chronic thrombocytopenia    Plan:  Continue ASA, Plavix, BB, statin   IV antibiotics per ID  Plans for 1st/2nd toe amputation per podiatry tomorrow      Plan of care discussed with patient, RN.    DAVY Hays  2/14/2024  1:23 PM

## 2024-02-14 NOTE — PLAN OF CARE
Received pt at 0730.  Pt is A&Ox4, complaints of R foot pain- PRN tylenol provided. Pt is on room air, lungs are diminished, no coughing. Pt is in NSR, no complaints of chest pain. Pedal pulses present with doppler. Pt is continent of B&B, active bowel sounds, abdomen non-tender, last BM 2-14, diminished urine production- HD patient. L groin incision present- dressing in place, soft/no drainage. Wound care assessed R&L foot/toes this AM, dressings reinforced. Pt updated with plan of care.       Problem: CARDIOVASCULAR - ADULT  Goal: Maintains optimal cardiac output and hemodynamic stability  Description: INTERVENTIONS:  - Monitor vital signs, rhythm, and trends  - Monitor for bleeding, hypotension and signs of decreased cardiac output  - Evaluate effectiveness of vasoactive medications to optimize hemodynamic stability  - Monitor arterial and/or venous puncture sites for bleeding and/or hematoma  - Assess quality of pulses, skin color and temperature  - Assess for signs of decreased coronary artery perfusion - ex. Angina  - Evaluate fluid balance, assess for edema, trend weights  Outcome: Progressing  Goal: Absence of cardiac arrhythmias or at baseline  Description: INTERVENTIONS:  - Continuous cardiac monitoring, monitor vital signs, obtain 12 lead EKG if indicated  - Evaluate effectiveness of antiarrhythmic and heart rate control medications as ordered  - Initiate emergency measures for life threatening arrhythmias  - Monitor electrolytes and administer replacement therapy as ordered  Outcome: Progressing     Problem: RESPIRATORY - ADULT  Goal: Achieves optimal ventilation and oxygenation  Description: INTERVENTIONS:  - Assess for changes in respiratory status  - Assess for changes in mentation and behavior  - Position to facilitate oxygenation and minimize respiratory effort  - Oxygen supplementation based on oxygen saturation or ABGs  - Provide Smoking Cessation handout, if applicable  - Encourage  broncho-pulmonary hygiene including cough, deep breathe, Incentive Spirometry  - Assess the need for suctioning and perform as needed  - Assess and instruct to report SOB or any respiratory difficulty  - Respiratory Therapy support as indicated  - Manage/alleviate anxiety  - Monitor for signs/symptoms of CO2 retention  Outcome: Progressing     Problem: SKIN/TISSUE INTEGRITY - ADULT  Goal: Skin integrity remains intact  Description: INTERVENTIONS  - Assess and document risk factors for pressure ulcer development  - Assess and document skin integrity  - Monitor for areas of redness and/or skin breakdown  - Initiate interventions, skin care algorithm/standards of care as needed  Outcome: Progressing  Goal: Incision(s), wounds(s) or drain site(s) healing without S/S of infection  Description: INTERVENTIONS:  - Assess and document risk factors for pressure ulcer development  - Assess and document skin integrity  - Assess and document dressing/incision, wound bed, drain sites and surrounding tissue  - Implement wound care per orders  - Initiate isolation precautions as appropriate  - Initiate Pressure Ulcer prevention bundle as indicated  Outcome: Progressing     Problem: Patient/Family Goals  Goal: Patient/Family Long Term Goal  Description: Patient's Long Term Goal: To go home   Stay out of hospital 2-14    Interventions:  - Monitor L groin site  - med compliance, follow ups    - See additional Care Plan goals for specific interventions  Outcome: Progressing  Goal: Patient/Family Short Term Goal  Description: Patient's Short Term Goal: no pain 2-14    Interventions:   - PRN meds, hot/cold packs, tell nurse if in pain     - See additional Care Plan goals for specific interventions  Outcome: Progressing

## 2024-02-15 ENCOUNTER — ANESTHESIA (OUTPATIENT)
Dept: SURGERY | Facility: HOSPITAL | Age: 79
End: 2024-02-15
Payer: MEDICARE

## 2024-02-15 ENCOUNTER — ANESTHESIA EVENT (OUTPATIENT)
Dept: SURGERY | Facility: HOSPITAL | Age: 79
End: 2024-02-15
Payer: MEDICARE

## 2024-02-15 ENCOUNTER — APPOINTMENT (OUTPATIENT)
Dept: GENERAL RADIOLOGY | Facility: HOSPITAL | Age: 79
End: 2024-02-15
Attending: SURGERY
Payer: MEDICARE

## 2024-02-15 PROCEDURE — 0QBN0ZZ EXCISION OF RIGHT METATARSAL, OPEN APPROACH: ICD-10-PCS | Performed by: PODIATRIST

## 2024-02-15 PROCEDURE — 99232 SBSQ HOSP IP/OBS MODERATE 35: CPT | Performed by: HOSPITALIST

## 2024-02-15 PROCEDURE — 76000 FLUOROSCOPY <1 HR PHYS/QHP: CPT | Performed by: SURGERY

## 2024-02-15 PROCEDURE — 0JBQ0ZZ EXCISION OF RIGHT FOOT SUBCUTANEOUS TISSUE AND FASCIA, OPEN APPROACH: ICD-10-PCS | Performed by: PODIATRIST

## 2024-02-15 PROCEDURE — 99232 SBSQ HOSP IP/OBS MODERATE 35: CPT | Performed by: INTERNAL MEDICINE

## 2024-02-15 RX ORDER — CEFAZOLIN SODIUM 1 G/3ML
INJECTION, POWDER, FOR SOLUTION INTRAMUSCULAR; INTRAVENOUS AS NEEDED
Status: DISCONTINUED | OUTPATIENT
Start: 2024-02-15 | End: 2024-02-15 | Stop reason: SURG

## 2024-02-15 RX ORDER — MIDODRINE HYDROCHLORIDE 5 MG/1
10 TABLET ORAL
Status: DISCONTINUED | OUTPATIENT
Start: 2024-02-16 | End: 2024-02-16

## 2024-02-15 RX ORDER — LABETALOL HYDROCHLORIDE 5 MG/ML
10 INJECTION, SOLUTION INTRAVENOUS EVERY 10 MIN PRN
Status: DISCONTINUED | OUTPATIENT
Start: 2024-02-15 | End: 2024-02-15 | Stop reason: HOSPADM

## 2024-02-15 RX ORDER — HYDROCODONE BITARTRATE AND ACETAMINOPHEN 5; 325 MG/1; MG/1
2 TABLET ORAL ONCE AS NEEDED
Status: DISCONTINUED | OUTPATIENT
Start: 2024-02-15 | End: 2024-02-15 | Stop reason: HOSPADM

## 2024-02-15 RX ORDER — HYDROMORPHONE HYDROCHLORIDE 1 MG/ML
0.2 INJECTION, SOLUTION INTRAMUSCULAR; INTRAVENOUS; SUBCUTANEOUS EVERY 5 MIN PRN
Status: DISCONTINUED | OUTPATIENT
Start: 2024-02-15 | End: 2024-02-15 | Stop reason: HOSPADM

## 2024-02-15 RX ORDER — BUPIVACAINE HYDROCHLORIDE 5 MG/ML
INJECTION, SOLUTION EPIDURAL; INTRACAUDAL AS NEEDED
Status: DISCONTINUED | OUTPATIENT
Start: 2024-02-15 | End: 2024-02-15 | Stop reason: HOSPADM

## 2024-02-15 RX ORDER — METOPROLOL TARTRATE 1 MG/ML
2.5 INJECTION, SOLUTION INTRAVENOUS ONCE
Status: DISCONTINUED | OUTPATIENT
Start: 2024-02-15 | End: 2024-02-15 | Stop reason: HOSPADM

## 2024-02-15 RX ORDER — ACETAMINOPHEN 500 MG
1000 TABLET ORAL ONCE AS NEEDED
Status: DISCONTINUED | OUTPATIENT
Start: 2024-02-15 | End: 2024-02-15 | Stop reason: HOSPADM

## 2024-02-15 RX ORDER — ONDANSETRON 2 MG/ML
4 INJECTION INTRAMUSCULAR; INTRAVENOUS EVERY 6 HOURS PRN
Status: DISCONTINUED | OUTPATIENT
Start: 2024-02-15 | End: 2024-02-15 | Stop reason: HOSPADM

## 2024-02-15 RX ORDER — NALOXONE HYDROCHLORIDE 0.4 MG/ML
80 INJECTION, SOLUTION INTRAMUSCULAR; INTRAVENOUS; SUBCUTANEOUS AS NEEDED
Status: DISCONTINUED | OUTPATIENT
Start: 2024-02-15 | End: 2024-02-15 | Stop reason: HOSPADM

## 2024-02-15 RX ORDER — LIDOCAINE AND PRILOCAINE 25; 25 MG/G; MG/G
CREAM TOPICAL
Status: COMPLETED | OUTPATIENT
Start: 2024-02-16 | End: 2024-02-16

## 2024-02-15 RX ORDER — HYDROMORPHONE HYDROCHLORIDE 1 MG/ML
0.6 INJECTION, SOLUTION INTRAMUSCULAR; INTRAVENOUS; SUBCUTANEOUS EVERY 5 MIN PRN
Status: DISCONTINUED | OUTPATIENT
Start: 2024-02-15 | End: 2024-02-15 | Stop reason: HOSPADM

## 2024-02-15 RX ORDER — SODIUM CHLORIDE, SODIUM LACTATE, POTASSIUM CHLORIDE, CALCIUM CHLORIDE 600; 310; 30; 20 MG/100ML; MG/100ML; MG/100ML; MG/100ML
INJECTION, SOLUTION INTRAVENOUS CONTINUOUS
Status: DISCONTINUED | OUTPATIENT
Start: 2024-02-15 | End: 2024-02-15 | Stop reason: HOSPADM

## 2024-02-15 RX ORDER — HYDROMORPHONE HYDROCHLORIDE 1 MG/ML
0.4 INJECTION, SOLUTION INTRAMUSCULAR; INTRAVENOUS; SUBCUTANEOUS EVERY 5 MIN PRN
Status: DISCONTINUED | OUTPATIENT
Start: 2024-02-15 | End: 2024-02-15 | Stop reason: HOSPADM

## 2024-02-15 RX ORDER — SODIUM CHLORIDE 9 MG/ML
INJECTION, SOLUTION INTRAVENOUS CONTINUOUS PRN
Status: DISCONTINUED | OUTPATIENT
Start: 2024-02-15 | End: 2024-02-15 | Stop reason: SURG

## 2024-02-15 RX ORDER — ALBUMIN (HUMAN) 12.5 G/50ML
25 SOLUTION INTRAVENOUS
Status: DISCONTINUED | OUTPATIENT
Start: 2024-02-15 | End: 2024-02-16

## 2024-02-15 RX ORDER — METOCLOPRAMIDE HYDROCHLORIDE 5 MG/ML
5 INJECTION INTRAMUSCULAR; INTRAVENOUS EVERY 8 HOURS PRN
Status: DISCONTINUED | OUTPATIENT
Start: 2024-02-15 | End: 2024-02-15 | Stop reason: HOSPADM

## 2024-02-15 RX ORDER — HYDROCODONE BITARTRATE AND ACETAMINOPHEN 5; 325 MG/1; MG/1
1 TABLET ORAL ONCE AS NEEDED
Status: DISCONTINUED | OUTPATIENT
Start: 2024-02-15 | End: 2024-02-15 | Stop reason: HOSPADM

## 2024-02-15 RX ORDER — LIDOCAINE HYDROCHLORIDE 10 MG/ML
INJECTION, SOLUTION EPIDURAL; INFILTRATION; INTRACAUDAL; PERINEURAL AS NEEDED
Status: DISCONTINUED | OUTPATIENT
Start: 2024-02-15 | End: 2024-02-15 | Stop reason: SURG

## 2024-02-15 RX ADMIN — LIDOCAINE HYDROCHLORIDE 25 MG: 10 INJECTION, SOLUTION EPIDURAL; INFILTRATION; INTRACAUDAL; PERINEURAL at 19:04:00

## 2024-02-15 RX ADMIN — ASPIRIN 81 MG: 81 TABLET ORAL at 08:33:00

## 2024-02-15 RX ADMIN — FENOFIBRATE 67 MG: 67 CAPSULE ORAL at 08:33:00

## 2024-02-15 RX ADMIN — CALCIUM ACETATE 667 MG: 667 CAPSULE ORAL at 21:55:00

## 2024-02-15 RX ADMIN — PANTOPRAZOLE SODIUM 40 MG: 40 TABLET, DELAYED RELEASE ORAL at 06:29:00

## 2024-02-15 RX ADMIN — CARVEDILOL 6.25 MG: 6.25 TABLET ORAL at 08:32:00

## 2024-02-15 RX ADMIN — CEFAZOLIN SODIUM 1 G: 1 INJECTION, POWDER, FOR SOLUTION INTRAMUSCULAR; INTRAVENOUS at 19:10:00

## 2024-02-15 RX ADMIN — HYDROCODONE BITARTRATE AND ACETAMINOPHEN 1 TABLET: 5; 325 TABLET ORAL at 07:22:00

## 2024-02-15 RX ADMIN — CINACALCET 30 MG: 30 TABLET, FILM COATED ORAL at 08:33:00

## 2024-02-15 RX ADMIN — CEFAZOLIN SODIUM/WATER 2 G: 2 G/20 ML SYRINGE (ML) INTRAVENOUS at 17:16:00

## 2024-02-15 RX ADMIN — HYDROCODONE BITARTRATE AND ACETAMINOPHEN 1 TABLET: 5; 325 TABLET ORAL at 03:31:00

## 2024-02-15 RX ADMIN — ALLOPURINOL 300 MG: 300 TABLET ORAL at 13:13:00

## 2024-02-15 RX ADMIN — HYDROCODONE BITARTRATE AND ACETAMINOPHEN 1 TABLET: 5; 325 TABLET ORAL at 14:16:00

## 2024-02-15 RX ADMIN — SODIUM CHLORIDE: 9 INJECTION, SOLUTION INTRAVENOUS at 20:30:00

## 2024-02-15 RX ADMIN — CLOPIDOGREL BISULFATE 75 MG: 75 TABLET ORAL at 08:32:00

## 2024-02-15 RX ADMIN — SODIUM CHLORIDE: 9 INJECTION, SOLUTION INTRAVENOUS at 18:58:00

## 2024-02-15 NOTE — PROGRESS NOTES
Progress Note  Maggie Pelletier Patient Status:  Inpatient    1945 MRN [id number]   Location [facility] 8NE-A Attending Oleg Addison MD   Hosp Day # 2 PCP LUZ MARIA BLACKBURN     Subjective:  Still with foot pain    Objective:  /33 (BP Location: Right arm)   Pulse 68   Temp 97.4 °F (36.3 °C) (Oral)   Resp 16   Ht 5' (1.524 m)   Wt 139 lb 12.4 oz (63.4 kg)   SpO2 98%   BMI 27.30 kg/m²     Telemetry: 1st degree AVB, RBBB      Intake/Output:    Intake/Output Summary (Last 24 hours) at 2/15/2024 1205  Last data filed at 2024 1600  Gross per 24 hour   Intake 170 ml   Output 3000 ml   Net -2830 ml       Last 3 Weights   02/15/24 0327 139 lb 12.4 oz (63.4 kg)   24 1724 147 lb 11.3 oz (67 kg)   24 1534 147 lb 11.3 oz (67 kg)   24 1512 145 lb (65.8 kg)   10/04/23 0600 144 lb (65.3 kg)   23 0040 138 lb (62.6 kg)   23 1826 138 lb (62.6 kg)   23 1502 138 lb (62.6 kg)       Labs:  Recent Labs   Lab 24  0709 24  1658 24  1046   * 86 157*   BUN 40* 41* 53*   CREATSERUM 4.31* 4.61* 5.74*   EGFRCR 10* 9* 7*   CA 8.5 8.1* 7.7*   ALB  --  2.9*  --     137 134*   K 3.9 3.8 4.1   CL 99 99 100   CO2 33.0* 28.0 27.0   ALKPHO  --  89  --    AST  --  35  --    ALT  --  21  --    BILT  --  0.9  --    TP  --  6.3*  --      Recent Labs   Lab 24  1003 24  1658 24  0524   RBC 3.13* 3.16* 2.67*   HGB 11.8* 12.2 10.5*   HCT 34.4* 35.9 29.4*   .9* 113.6* 110.1*   MCH 37.7* 38.6* 39.3*   MCHC 34.3 34.0 35.7   RDW 16.5 15.9 16.2   NEPRELIM 3.29  --   --    WBC 6.1 7.1 7.7   .0* 116.0* 106.0*         No results for input(s): \"TROP\", \"TROPHS\", \"CK\" in the last 168 hours.  Lab Results   Component Value Date    INR 1.18 2024    INR 1.19 2022    INR 1.01 2022       Diagnostics:     Review of Systems   Constitutional: Negative.   Cardiovascular: Negative.    Respiratory: Negative.         Physical  Exam:    Gen: Alert, oriented x 3, in no apparent distress  Heent: Pupils equal, reactive. Mucous membranes moist.   Cardiac: Regular rate and rhythm, normal S1,S2  Lungs: Clear to auscultation  Abd: Soft, non tender, non distended  Ext: No edema. Right foot with ulcer  Skin: Warm, dry          Medications:     [START ON 2/16/2024] epoetin rigoberto  10,000 Units Intravenous Once in dialysis    [START ON 2/16/2024] lidocaine-prilocaine   Topical Once in dialysis    [START ON 2/16/2024] midodrine  10 mg Oral Once in dialysis    heparin  5,000 Units Subcutaneous 2 times per day    clopidogrel  75 mg Oral Daily    pantoprazole  40 mg Intravenous QAM AC    Or    pantoprazole  40 mg Oral QAM AC    allopurinol  300 mg Oral Daily    aspirin  81 mg Oral Daily    atorvastatin  10 mg Oral Nightly    calcium acetate  1 capsule Oral TID CC    carvedilol  6.25 mg Oral BID with meals    fenofibrate micronized  67 mg Oral Daily with breakfast    ceFAZolin  2 g Intravenous Q24H    cinacalcet  30 mg Oral Daily with breakfast             Assessment:  Severe Pvd, right foot gangrene  2/13/24 angioplasty of the right peroneal artery, tibioperoneal trunk, R FSA per Dr. Addison   On ASA, plavix, statin   On iv antibiotics  CAD, remote CABG  On ASA, plavix, BB, statin   Hx of CEA  TAVR 2022  Preserved LVEF  Mean systolic gradient 13 mmhg  Moderate to severe MR, PASP 55 mmHg  ESRD on HD  CLL  Remote CEA  Chronic thrombocytopenia    Plan:  Plans for OR today for right second metatarsal head resection with excision of right foot ulcerations with primary closure per Podiatry  Continue ASA, Plavix, BB, statin  Continue IV antibiotics.     Plan of care discussed with patient, RN.    DAVY Hays  2/15/2024  12:05 PM    Patient seen and examined independently.  Note reviewed and labs reviewed.  Agree with above assessment and plan.

## 2024-02-15 NOTE — PLAN OF CARE
Assumed care of pt around 1930. Pt alert and oriented x4, complains of right foot pain, PRN medication given as prescribed. Denies any shortness of breath. Lung sounds diminished bilaterally, NSR on tele. Abdomen soft, non tender, LBM 2/15. Left groin dressing removed, site C/D/I. Bilateral feet dressings C/D/I. Left arm AV fistula present. Bed locked and in lowest position, call light within reach.  POC: NPO since midnight for possible surgery w/ podiatry, daily weight, pain management, wound care. Pt updated, all questions answered, verbalized understanding.

## 2024-02-15 NOTE — PLAN OF CARE
Problem: CARDIOVASCULAR - ADULT  Goal: Maintains optimal cardiac output and hemodynamic stability  Description: INTERVENTIONS:  - Monitor vital signs, rhythm, and trends  - Monitor for bleeding, hypotension and signs of decreased cardiac output  - Evaluate effectiveness of vasoactive medications to optimize hemodynamic stability  - Monitor arterial and/or venous puncture sites for bleeding and/or hematoma  - Assess quality of pulses, skin color and temperature  - Assess for signs of decreased coronary artery perfusion - ex. Angina  - Evaluate fluid balance, assess for edema, trend weights  Outcome: Progressing  Goal: Absence of cardiac arrhythmias or at baseline  Description: INTERVENTIONS:  - Continuous cardiac monitoring, monitor vital signs, obtain 12 lead EKG if indicated  - Evaluate effectiveness of antiarrhythmic and heart rate control medications as ordered  - Initiate emergency measures for life threatening arrhythmias  - Monitor electrolytes and administer replacement therapy as ordered  Outcome: Progressing     Problem: RESPIRATORY - ADULT  Goal: Achieves optimal ventilation and oxygenation  Description: INTERVENTIONS:  - Assess for changes in respiratory status  - Assess for changes in mentation and behavior  - Position to facilitate oxygenation and minimize respiratory effort  - Oxygen supplementation based on oxygen saturation or ABGs  - Provide Smoking Cessation handout, if applicable  - Encourage broncho-pulmonary hygiene including cough, deep breathe, Incentive Spirometry  - Assess the need for suctioning and perform as needed  - Assess and instruct to report SOB or any respiratory difficulty  - Respiratory Therapy support as indicated  - Manage/alleviate anxiety  - Monitor for signs/symptoms of CO2 retention  Outcome: Progressing  Received sitting in chair. Vss, resps unlabored at rest on RA. Pt denies any cp, sob or any discomfort at this time. Pt currently NPO for R 2nd toe amputation today;  fall precautions continued.   Will continue to monitor. Labs and meds as ordered. Up to chair tid for meals. Keep npo for surgery today. Pain meds as ordered prn. IV abx's as ordered.   1830  Pt to OR via bed.   2130  Received from PACU. Pt A&Ox4; denies any pain at this time. VSS, resps unlabored on RA. R foot with surgical dressing on D/I/C, no oozing noted, Fall precautions continued. Will continue to monitor.

## 2024-02-15 NOTE — PROGRESS NOTES
[facility]     Nephrology Progress Note    Maggie Pelletier Patient Status:  Inpatient    1945 MRN [id number]   Location [facility] 8NE-A Attending Oleg Addison MD   Hosp Day # 2 PCP LUZ MARIA BLACKBURN       SUBJECTIVE:  Stable this AM, up in chair, awaiting surgery        Physical Exam:   /73 (BP Location: Right arm)   Pulse 70   Temp 98.2 °F (36.8 °C) (Oral)   Resp 16   Ht 5' (1.524 m)   Wt 139 lb 12.4 oz (63.4 kg)   SpO2 98%   BMI 27.30 kg/m²   Temp (24hrs), Av.8 °F (36.6 °C), Min:97 °F (36.1 °C), Max:98.4 °F (36.9 °C)       Intake/Output Summary (Last 24 hours) at 2/15/2024 0825  Last data filed at 2024 1600  Gross per 24 hour   Intake 170 ml   Output 3000 ml   Net -2830 ml     Last 3 Weights   02/15/24 0327 139 lb 12.4 oz (63.4 kg)   24 1724 147 lb 11.3 oz (67 kg)   24 1534 147 lb 11.3 oz (67 kg)   24 1512 145 lb (65.8 kg)   10/04/23 0600 144 lb (65.3 kg)   23 0040 138 lb (62.6 kg)   23 1826 138 lb (62.6 kg)   23 1502 138 lb (62.6 kg)   22 0600 163 lb 5.8 oz (74.1 kg)   22 0400 165 lb (74.8 kg)   22 1508 165 lb (74.8 kg)     General: Alert and oriented in no apparent distress.  HEENT: No scleral icterus, MMM  Neck: Supple, no JOE or thyromegaly  Cardiac: Regular rate and rhythm, S1, S2 normal, no murmur or rub  Lungs: Clear without wheezes, rales, rhonchi.    Abdomen: Soft, non-tender. + bowel sounds, no palpable organomegaly  Extremities: Without clubbing, cyanosis or edema. L arm AVF with bruit/thrill  Neurologic:  moving all extremities  Skin: Warm and dry, no rash        Labs:     Recent Labs   Lab 24  1003 24  1658 24  0524   WBC 6.1 7.1 7.7   HGB 11.8* 12.2 10.5*   .9* 113.6* 110.1*   .0* 116.0* 106.0*   INR  --  1.18  --        Recent Labs   Lab 24  1003 24  0709 24  1658 24  1046    138 137 134*   K 4.4 3.9 3.8 4.1   CL 98 99 99 100   CO2 33.0*  33.0* 28.0 27.0   BUN 54* 40* 41* 53*   CREATSERUM 4.82* 4.31* 4.61* 5.74*   CA 7.9* 8.5 8.1* 7.7*   * 100* 86 157*       Recent Labs   Lab 02/13/24  1658   ALT 21   AST 35   ALB 2.9*       No results for input(s): \"PGLU\" in the last 168 hours.    Meds:   Current Facility-Administered Medications   Medication Dose Route Frequency    ondansetron (Zofran) 4 MG/2ML injection 4 mg  4 mg Intravenous Q6H PRN    heparin (Porcine) 5000 UNIT/ML injection 5,000 Units  5,000 Units Subcutaneous 2 times per day    acetaminophen (Tylenol) tab 650 mg  650 mg Oral Q4H PRN    Or    HYDROcodone-acetaminophen (Norco) 5-325 MG per tab 1 tablet  1 tablet Oral Q4H PRN    Or    HYDROcodone-acetaminophen (Norco) 5-325 MG per tab 2 tablet  2 tablet Oral Q4H PRN    clopidogrel (Plavix) tab 75 mg  75 mg Oral Daily    polyethylene glycol (PEG 3350) (Miralax) 17 g oral packet 17 g  17 g Oral Daily PRN    sennosides (Senokot) tab 17.2 mg  17.2 mg Oral Nightly PRN    bisacodyl (Dulcolax) 10 MG rectal suppository 10 mg  10 mg Rectal Daily PRN    pantoprazole (Protonix) 40 mg in sodium chloride 0.9% PF 10 mL IV push  40 mg Intravenous QAM AC    Or    pantoprazole (Protonix) DR tab 40 mg  40 mg Oral QAM AC    allopurinol (Zyloprim) tab 300 mg  300 mg Oral Daily    aspirin DR tab 81 mg  81 mg Oral Daily    atorvastatin (Lipitor) tab 10 mg  10 mg Oral Nightly    calcium acetate (Phoslo) cap 667 mg  1 capsule Oral TID CC    carvedilol (Coreg) tab 6.25 mg  6.25 mg Oral BID with meals    fenofibrate micronized (Lofibra) cap 67 mg  67 mg Oral Daily with breakfast    ceFAZolin (Ancef) 2 g in 20mL IV syringe premix  2 g Intravenous Q24H    cinacalcet (Sensipar) tab 30 mg  30 mg Oral Daily with breakfast         Impression/Plan:      #1.  ESRD- due to HTN.  HD to cont per usual MWF routine     #2.  Anemiia- due to ESRD. CHAVEZ for goal hgb 10-11 gms     #3.  PAD- s/p extensive intervention.  Ongoing multispecialty care with plan for OR  today        Questions/concerns were discussed with patient and/or family by bedside.          Steve Howell MD  2/15/2024  8:25 AM

## 2024-02-15 NOTE — PLAN OF CARE
Problem: CARDIOVASCULAR - ADULT  Goal: Maintains optimal cardiac output and hemodynamic stability  Description: INTERVENTIONS:  - Monitor vital signs, rhythm, and trends  - Monitor for bleeding, hypotension and signs of decreased cardiac output  - Evaluate effectiveness of vasoactive medications to optimize hemodynamic stability  - Monitor arterial and/or venous puncture sites for bleeding and/or hematoma  - Assess quality of pulses, skin color and temperature  - Assess for signs of decreased coronary artery perfusion - ex. Angina  - Evaluate fluid balance, assess for edema, trend weights  Outcome: Progressing  Goal: Absence of cardiac arrhythmias or at baseline  Description: INTERVENTIONS:  - Continuous cardiac monitoring, monitor vital signs, obtain 12 lead EKG if indicated  - Evaluate effectiveness of antiarrhythmic and heart rate control medications as ordered  - Initiate emergency measures for life threatening arrhythmias  - Monitor electrolytes and administer replacement therapy as ordered  Outcome: Progressing     Problem: RESPIRATORY - ADULT  Goal: Achieves optimal ventilation and oxygenation  Description: INTERVENTIONS:  - Assess for changes in respiratory status  - Assess for changes in mentation and behavior  - Position to facilitate oxygenation and minimize respiratory effort  - Oxygen supplementation based on oxygen saturation or ABGs  - Provide Smoking Cessation handout, if applicable  - Encourage broncho-pulmonary hygiene including cough, deep breathe, Incentive Spirometry  - Assess the need for suctioning and perform as needed  - Assess and instruct to report SOB or any respiratory difficulty  - Respiratory Therapy support as indicated  - Manage/alleviate anxiety  - Monitor for signs/symptoms of CO2 retention  Outcome: Progressing     Problem: SKIN/TISSUE INTEGRITY - ADULT  Goal: Skin integrity remains intact  Description: INTERVENTIONS  - Assess and document risk factors for pressure ulcer  development  - Assess and document skin integrity  - Monitor for areas of redness and/or skin breakdown  - Initiate interventions, skin care algorithm/standards of care as needed  Outcome: Progressing  Goal: Incision(s), wounds(s) or drain site(s) healing without S/S of infection  Description: INTERVENTIONS:  - Assess and document risk factors for pressure ulcer development  - Assess and document skin integrity  - Assess and document dressing/incision, wound bed, drain sites and surrounding tissue  - Implement wound care per orders  - Initiate isolation precautions as appropriate  - Initiate Pressure Ulcer prevention bundle as indicated  Outcome: Progressing     Problem: Patient/Family Goals  Goal: Patient/Family Long Term Goal  Description: Patient's Long Term Goal: To go home   Stay out of hospital 2-14    Interventions:  - Monitor L groin site  - med compliance, follow ups    - See additional Care Plan goals for specific interventions  Outcome: Progressing  Goal: Patient/Family Short Term Goal  Description: Patient's Short Term Goal: no pain 2-14    Interventions:   - PRN meds, hot/cold packs, tell nurse if in pain     - See additional Care Plan goals for specific interventions  Outcome: Progressing

## 2024-02-15 NOTE — CONSULTS
[facility]    Report of Consultation    Maggie Pelletier Patient Status:  Inpatient    1945 MRN [id number]   Location [facility] 8NE-A Attending Oleg Addison MD   Hosp Day # 1 PCP LUZ MARIA BLACKBURN     Date of Admission:  2024  Date of Consult: 2024    Reason for Consultation:  Consulted by Dr. Addison for right foot ulcerations with second metatarsal head osteomyelitis    History of Present Illness:  Maggie Pelletier is a a(n) 78 year old female with type 2 diabetes, ESRD, HTN, PAD seen this morning resting comfortably in her hospital chair.  Patient is well-known to me as she has been following up with me in wound care clinic for nonhealing ulcerations to the dorsal and plantar aspects of the right foot.  She did obtain a recent MRI revealing concerns of osteomyelitis of the second metatarsal head.  Patient continues to have pain to her right foot.  Patient had a revascularization procedure done by Dr. Addison yesterday and is hoping to discuss further treatment options in regards to her ongoing right foot ulcers.    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 She has never used smokeless tobacco. She reports that she does not currently use alcohol. She reports that she does not use drugs.    Allergies:  Allergies   Allergen Reactions    Epinephrine OTHER  (SEE COMMENTS)     Dental epi. Pain for 2 weeks after dental procedure.       Medications:    Current Facility-Administered Medications:     ondansetron (Zofran) 4 MG/2ML injection 4 mg, 4 mg, Intravenous, Q6H PRN    heparin (Porcine) 5000 UNIT/ML injection 5,000 Units, 5,000 Units, Subcutaneous, 2 times per day    acetaminophen (Tylenol) tab 650 mg, 650 mg, Oral, Q4H PRN **OR** HYDROcodone-acetaminophen (Norco) 5-325 MG per tab 1 tablet, 1 tablet, Oral, Q4H PRN **OR** HYDROcodone-acetaminophen (Norco) 5-325 MG per tab 2 tablet, 2 tablet, Oral, Q4H PRN    clopidogrel (Plavix) tab 75 mg, 75 mg, Oral, Daily    polyethylene glycol (PEG 3350) (Miralax) 17 g oral packet 17 g, 17 g, Oral, Daily PRN    sennosides (Senokot) tab 17.2 mg, 17.2 mg, Oral, Nightly PRN    bisacodyl (Dulcolax) 10 MG rectal suppository 10 mg, 10 mg, Rectal, Daily PRN    pantoprazole (Protonix) 40 mg in sodium chloride 0.9% PF 10 mL IV push, 40 mg, Intravenous, QAM AC **OR** pantoprazole (Protonix) DR tab 40 mg, 40 mg, Oral, QAM AC    allopurinol (Zyloprim) tab 300 mg, 300 mg, Oral, Daily    aspirin DR tab 81 mg, 81 mg, Oral, Daily    atorvastatin (Lipitor) tab 10 mg, 10 mg, Oral, Nightly    calcium acetate (Phoslo) cap 667 mg, 1 capsule, Oral, TID CC    carvedilol (Coreg) tab 6.25 mg, 6.25 mg, Oral, BID with meals    fenofibrate micronized (Lofibra) cap 67 mg, 67 mg, Oral, Daily with breakfast    ceFAZolin (Ancef) 2 g in 20mL IV syringe premix, 2 g, Intravenous, Q24H    cinacalcet (Sensipar) tab 30 mg, 30 mg, Oral, Daily with breakfast    Review of Systems:  10 point ROS completed and was negative, except for pertinent positive and negatives stated in subjective.        2/14/2024     4:00 PM 2/14/2024     5:00 PM   Vitals History   /85    BP Location Right arm    Pulse 79 82   Resp 20    Temp 98.4 °F (36.9 °C)    SpO2 100 %         Physical Exam:  Vascular: pedal pulses are nonpalpable. CFT <5 sec to digits  Musculoskeletal: no acute  deformities noted.  Status post right second digit amputation.  Pain with palpation to left plantar foot ulceration with prominent and visible second metatarsal head noted.    Neurological: Gross sensation intact via light touch.  Protective sensation diminished via Ipswitch test.  Integument: See below for recent ulcer pictures.  Visible second metatarsal head noted within the dorsal wound site.  Positive probe to second metatarsal head of both wounds.  No current purulent drainage, surrounding erythema, or other signs of acute soft tissue infection                Imaging:  US ARTERIAL DUPLEX LOWER EXTREMITY RIGHT (CPT=93926)    Result Date: 2/14/2024  CONCLUSION:  Imaging findings demonstrate occlusion of the proximal/mid right posterior tibial artery with reconstitution of flow at the distal right posterior tibial artery.  A stenosis is noted at the proximal right SHA.  Irregular heart rate.  Please see above for further details.   LOCATION:  [id number]    Dictated by (CST): Stromberg, LeRoy, MD on 2/14/2024 at 3:46 PM     Finalized by (CST): Stromberg, LeRoy, MD on 2/14/2024 at 3:51 PM       MRI FOOT, RIGHT (CPT=73718)    Result Date: 2/12/2024  CONCLUSION:  Mild, early osteomyelitis of the 2nd metatarsal head with adjacent soft tissue ulceration.   LOCATION:  [facility]   Dictated by (CST): Lucian Sexton DO on 2/12/2024 at 8:24 AM     Finalized by (CST): Lucian Sexton DO on 2/12/2024 at 8:29 AM       US CAROTID DOPPLER BILAT - DIAG IMG (CPT=93880)    Result Date: 2/1/2024  CONCLUSION:  Less than 50% carotid stenosis bilaterally.   LOCATION:  [place]     Dictated by (CST): Trenton Vasquez MD on 2/01/2024 at 1:16 PM     Finalized by (CST): Trenton Vasquez MD on 2/01/2024 at 1:25 PM       US ARTERIAL DUPLEX LOWER EXTREMITY BILATERAL (CPT=93925)    Result Date: 2/1/2024  CONCLUSION:   1. Significantly decreased flow velocities involving the common femoral artery and superficial femoral artery, popliteal artery since  previous study with very slow almost occlusive velocities within the posterior tibial artery and anterior tibial artery with monophasic waveform.  Finding may be related to a high-grade stenosis in the region of the iliac arteries with diffuse atherosclerotic disease of the right lower extremity below the knee.  Further evaluation with CTA lower extremity imaging would be  recommended.  2. There is no evidence of high-grade stenosis involving the left above the knee arteries.  There is chronic occlusive disease of the posterior tibial artery with very slow flow but patency involving the anterior tibial artery and dorsalis pedis.   LOCATION:  [place]   Dictated by (CST): Trenton Vasquez MD on 2/01/2024 at 1:07 PM     Finalized by (CST): Trenton Vasquez MD on 2/01/2024 at 1:16 PM       US VEIN MAP LOWER EXTREMITY BILAT (CPT=93970)    Result Date: 2/1/2024  CONCLUSION:  There is chronic thrombus within the superficial veins of the greater saphenous vein bilaterally.   LOCATION:  [place]     Dictated by (CST): Trenton Vasquez MD on 2/01/2024 at 11:57 AM     Finalized by (CST): Trenton Vasquez MD on 2/01/2024 at 12:01 PM            Laboratory Data:  Recent Labs   Lab 02/11/24  1003 02/13/24  1658 02/14/24  0524   RBC 3.13*   < > 2.67*   HGB 11.8*   < > 10.5*   HCT 34.4*   < > 29.4*   .9*   < > 110.1*   MCH 37.7*   < > 39.3*   MCHC 34.3   < > 35.7   RDW 16.5   < > 16.2   NEPRELIM 3.29  --   --    WBC 6.1   < > 7.7   .0*   < > 106.0*    < > = values in this interval not displayed.         Impression and Plan:  Patient Active Problem List   Diagnosis    Gangrene of toe of right foot ([facility])    Ischemic pain of foot, right    PAD (peripheral artery disease) ([facility])    Great toe amputation status, right    Gangrene of right foot ([facility])    ESRD (end stage renal disease) ([facility])    Primary hypertension    Anemia in ESRD (end-stage renal disease)  ([facility])    Carotid stenosis, right    Osteomyelitis of right foot ([facility])     Ulcer of right foot, unspecified ulcer stage (HCC)    CLL (chronic lymphocytic leukemia) (HCC)    Status post amputation of lesser toe of right foot (HCC)    Gangrene of right lower extremity due to atherosclerosis (HCC)         Plan:  -Patient seen this morning resting comfortably.      -Discussed recent MRI findings with patient, which was consistent with osteomyelitis of the right second metatarsal head.    -I do recommend moving forward with second metatarsal head resection with excision of dorsal and plantar ulcerations with primary closure.  I did discuss the procedure in detail with the patient.  It was explained to patient that due to her ongoing comorbidities, she is at high risk of not healing these wounds and at a higher risk for more proximal amputations    -Patient did undergo a revascularization procedure by Dr. Addison yesterday, 2/13.  Had a brief discussion about the case with Dr. Addison.  Improvements in Doppler flow to dorsalis pedis with incomplete plantar arch per updated arterial ultrasound.  Because of this, patient is at risk of nonhealing.    -Tentative plan is to move forward with surgical intervention tomorrow, 2/15/2024.  I will see patient in the morning to discuss further    Thank you for the opportunity to participate in patient's care.    Kev Ba DPM   2/14/2024  7:00 PM

## 2024-02-15 NOTE — PROGRESS NOTES
Above noted. Repair patent- per Dr. Ba- CSA will cover 2/16-2/22. Can discharge when OK with other MDs/ Dr. Mejia. Discussed with patient/ daughter Heather- cannot guarantee healing of amputation- currently no target vessel for bypass- Also talked with Nancy- all questions answered.

## 2024-02-15 NOTE — PROGRESS NOTES
INFECTIOUS DISEASE  PROGRESS NOTE            [facility]    Maggie Pelletier Patient Status:  Inpatient    1945 MRN [id number]   Formerly [facility] 8NE-A Attending Oleg Addison MD   Hosp Day # 2 PCP LUZ MARIA BLACKBURN     Antibiotics: cefazolin    Subjective:  Surgery today planned    Objective:  Temp:  [97 °F (36.1 °C)-98.4 °F (36.9 °C)] 97.4 °F (36.3 °C)  Pulse:  [68-82] 68  Resp:  [16-20] 16  BP: ()/(33-99) 108/33  SpO2:  [98 %-100 %] 98 %      Vital signs:Temp:  [97 °F (36.1 °C)-98.4 °F (36.9 °C)] 97.4 °F (36.3 °C)  Pulse:  [68-82] 68  Resp:  [16-20] 16  BP: ()/(33-99) 108/33  SpO2:  [98 %-100 %] 98 %  Respiratory: Non labored, symmetric excursion, normal respirations     Musculoskeletal: foot dressed  Labs:     COVID-19 Lab Results    COVID-19  Lab Results   Component Value Date    COVID19 Not Detected 2022       Pro-Calcitonin  No results for input(s): \"PCT\" in the last 168 hours.    Cardiac  No results for input(s): \"TROP\", \"PBNP\" in the last 168 hours.    Creatinine Kinase  No results for input(s): \"CK\" in the last 168 hours.    Inflammatory Markers  No results for input(s): \"CRP\", \"ESTHER\", \"LDH\", \"DDIMER\" in the last 168 hours.    Recent Labs   Lab 24  1003 24  1658 24  0524   RBC 3.13*   < > 2.67*   HGB 11.8*   < > 10.5*   HCT 34.4*   < > 29.4*   .9*   < > 110.1*   MCH 37.7*   < > 39.3*   MCHC 34.3   < > 35.7   RDW 16.5   < > 16.2   NEPRELIM 3.29  --   --    WBC 6.1   < > 7.7   .0*   < > 106.0*    < > = values in this interval not displayed.         Recent Labs   Lab 24  0709 24  1658 24  1046   * 86 157*   BUN 40* 41* 53*   CREATSERUM 4.31* 4.61* 5.74*   CA 8.5 8.1* 7.7*   ALB  --  2.9*  --     137 134*   K 3.9 3.8 4.1   CL 99 99 100   CO2 33.0* 28.0 27.0   ALKPHO  --  89  --    AST  --  35  --    ALT  --  21  --    BILT  --  0.9  --    TP  --  6.3*  --            Problem list reviewed:  Patient Active  Problem List   Diagnosis    Gangrene of toe of right foot (HCC)    Ischemic pain of foot, right    PAD (peripheral artery disease) (HCC)    Great toe amputation status, right    Gangrene of right foot (HCC)    ESRD (end stage renal disease) (HCC)    Primary hypertension    Anemia in ESRD (end-stage renal disease)  (HCC)    Carotid stenosis, right    Osteomyelitis of right foot (HCC)    Ulcer of right foot, unspecified ulcer stage (HCC)    CLL (chronic lymphocytic leukemia) (HCC)    Status post amputation of lesser toe of right foot (HCC)    Gangrene of right lower extremity due to atherosclerosis (HCC)    Right foot ulcer, with necrosis of bone (HCC)             ASSESSMENT/PLAN:  1. Osteomyelitis right 2nd MT  Open wound probes through, previous 1st/2ndt toe amputation    -SP angio procedure 2/13  -podiatry surgery today      Chele Crowe MD, MD [facility]  [phone]

## 2024-02-15 NOTE — PROGRESS NOTES
Improved fl;ow right foot- may be adequate for healing- an incomplete plantar arch. -However no target vessel for bypass. Possible podiatric procedure today- would go homeon anti-coagulation. CSA will cover 2/16-2/22

## 2024-02-15 NOTE — PROGRESS NOTES
[facility]     Hospitalist Progress Note     Maggie Pelletier Patient Status:  Observation    1945 MRN [id number]   [facility] 8NE-A Attending Oleg Addison MD   Hosp Day # 2 PCP LUZ MARIA BLACKBURN     Chief Complaint: HTN    Subjective:     Patient's right foot pain is improved today.  She denies having any fevers, no chills, nausea vomiting.  She has no other acute complaints at this time.  Objective:    Review of Systems:   A comprehensive review of systems was completed; pertinent positive and negatives stated in subjective.    Vital signs:  Temp:  [97 °F (36.1 °C)-98.4 °F (36.9 °C)] 98.2 °F (36.8 °C)  Pulse:  [70-82] 70  Resp:  [16-20] 16  BP: ()/(32-99) 112/73  SpO2:  [96 %-100 %] 98 %    Physical Exam:    General: No acute distress, pleasant   Respiratory: No wheezes, no rhonchi  Cardiovascular: S1, S2, regular rate and rhythm  Abdomen: Soft, Non-tender, non-distended, positive bowel sounds  Neuro: No new focal deficits.   Extremities: Erythema to right foot with + discharge         Diagnostic Data:    Labs:  Recent Labs   Lab 24  1003 24  1658 24  0524   WBC 6.1 7.1 7.7   HGB 11.8* 12.2 10.5*   .9* 113.6* 110.1*   .0* 116.0* 106.0*   INR  --  1.18  --        Recent Labs   Lab 24  0709 24  1658 24  1046   * 86 157*   BUN 40* 41* 53*   CREATSERUM 4.31* 4.61* 5.74*   CA 8.5 8.1* 7.7*   ALB  --  2.9*  --     137 134*   K 3.9 3.8 4.1   CL 99 99 100   CO2 33.0* 28.0 27.0   ALKPHO  --  89  --    AST  --  35  --    ALT  --  21  --    BILT  --  0.9  --    TP  --  6.3*  --        Estimated Creatinine Clearance: 5.8 mL/min (A) (based on SCr of 5.74 mg/dL (H)).    No results for input(s): \"TROP\", \"TROPHS\", \"CK\" in the last 168 hours.    Recent Labs   Lab 24  1658   PTP 15.1*   INR 1.18                  Microbiology    Hospital Encounter on 24   1. Blood Culture     Status: None (Preliminary result)    Collection  Time: 02/13/24  4:59 PM    Specimen: Blood,peripheral   Result Value Ref Range    Blood Culture Result No Growth 1 Day N/A         Imaging: Reviewed in Epic.    Medications:    heparin  5,000 Units Subcutaneous 2 times per day    clopidogrel  75 mg Oral Daily    pantoprazole  40 mg Intravenous QAM AC    Or    pantoprazole  40 mg Oral QAM AC    allopurinol  300 mg Oral Daily    aspirin  81 mg Oral Daily    atorvastatin  10 mg Oral Nightly    calcium acetate  1 capsule Oral TID CC    carvedilol  6.25 mg Oral BID with meals    fenofibrate micronized  67 mg Oral Daily with breakfast    ceFAZolin  2 g Intravenous Q24H    cinacalcet  30 mg Oral Daily with breakfast       Assessment & Plan:      # PAD s/p angiogram  -per Dr. Addison  -ASA, plavix, statin      #Osteomyelitis with cellulitis  -ID consulted  -Podiatry consulted  -cefazolin  -f/u Blood Cx -> NGTD  -plan for surgery today with podiatry     #ESRD on HD - HD per nephro     #CAD s/p remote CABG    #CLL  #h/o CEA   #Chronic thrombocytopenia       Steven Aquino MD    Supplementary Documentation:     Quality:  DVT Mechanical Prophylaxis:        DVT Pharmacologic Prophylaxis   Medication    heparin (Porcine) 5000 UNIT/ML injection 5,000 Units         DVT Pharmacologic prophylaxis: Aspirin 81 mg      Code Status: Full Code  Marin: No urinary catheter in place  Marin Duration (in days):   Central line:    WOLFGANG: 2/17/2024    Discharge is dependent on: surgery outcome  At this point Ms. Pelletier is expected to be discharge to: Home?    The 21st Century Cures Act makes medical notes like these available to patients in the interest of transparency. Please be advised this is a medical document. Medical documents are intended to carry relevant information, facts as evident, and the clinical opinion of the practitioner. The medical note is intended as peer to peer communication and may appear blunt or direct. It is written in medical language and may contain abbreviations or verbiage  that are unfamiliar.             **Certification      PHYSICIAN Certification of Need for Inpatient Hospitalization - Initial Certification    Patient will require inpatient services that will reasonably be expected to span two midnight's based on the clinical documentation in H+P.   Based on patients current state of illness, I anticipate that, after discharge, patient will require TBD.

## 2024-02-15 NOTE — PLAN OF CARE
Seen by Dr Ba, podiatry  New orders noted   Surgery this afternoon around 6 pm  Consent signed for right 2nd toe Metatarsal resection and excision right foot  Pt npo after mn except water for meds  I called her daughter Heather for poc updates  Will continue to monitor.

## 2024-02-15 NOTE — PROGRESS NOTES
Patient seen this morning.  No overnight events.  Plan is for surgery this evening at 1800.  Patient will start NPO at 1000.      Discussed procedure in detail with patient.  All of her questions and concerns were addressed.  Consent order placed.    Will plan for right second metatarsal head resection with excision of right foot ulcerations with primary closure.    The patient has been advised of the approximate disability involved for these procedures. In addition, the patient has been advised as to the alternatives of care, including continued conservative care as well as surgical procedures. The patient has failed all conservative treatment at this point. The patient understands that if surgical procedures are performed, there are risks and complications that could occur, including but not limited to: hematoma formation, damage to the nervous system, seroma formation, development of a DVT or phlebitis, infection, painful scar tissue formation, impaired healing, increased chance of swelling, reaction to implanted biomaterials, continued pain, loss of limb, loss of life, and the possibility that future surgery may need to be performed. The patient was given the opportunity to ask questions which were answered. The patient voiced no concerns and will consider all these options. Patient desires surgical intervention. No guarantees were given or implied. Pt consented freely to surgical intervention.  I spent approximately 30 minutes discussing the surgical procedures at length. I reviewed in detail the procedure to be performed, postoperative course, disability to be expected, healing time, prognosis and the importance of their following the recommended postoperative care. Possible complications discussed included but not limited to recurrence of deformity, postoperative pain, swelling, rejection of the implant if utilized, return to surgery, infection, residual pain, possible blood clot formation, bleeding, etc.  Possible complications relating to over activity and limitations during the postoperative period were reviewed. The patient has responsibilities to help insure successful outcome of the surgery. Postoperative oral and written instructions were reviewed and postoperative course of treatment discussed in detail. Management of postoperative pain was discussed. All questions related to preoperative, operative and postoperative course of treatment were answered to their understanding and satisfaction. RTO and follow up after surgery is necessary and expected and agreed to by the patient. The patient acknowledged understanding of the above and agrees to follow all instructions and protocols. No guarantee or warranty was given or implied as to the results of the surgery. Consent was reviewed.

## 2024-02-15 NOTE — CM/SW NOTE
02/15/24 1000   CM/SW Referral Data   Referral Source Social Work (self-referral)   Reason for Referral Discharge planning   Informant Patient   Medical Hx   Does patient have an established PCP? Yes   Patient Info   Patient's Current Mental Status at Time of Assessment Alert;Oriented   Patient's Home Environment House   Number of Levels in Home 1   Patient lives with Alone   Patient Status Prior to Admission   Independent with ADLs and Mobility No   Pt. requires assistance with Driving   Services in place prior to admission DME/Supplies at home;Dialysis   Type of DME/Supplies Straight Cane;Wheeled Walker   Dialysis Clinic US Renal   Scheduled Dialysis days M-W-F   Dialysis scheduled time 1000   Discharge Needs   Anticipated D/C needs To be determined;Home health care     SW met with pt at bedside to complete assessment. Pt is a 79 y/o female who presents as alert and oriented x4. Pt resides at home alone in [place] in a ranch home. Pt has a RW and cane to navigate. Pt reports that she goes to HD at [facility] in [facility] at 10:00am. Pt utilizes [facility] for transport to/from dialysis. Pt has supportive children that live in the area. Daughter does grocery shopping for pt and take pt to/from doctor visits. Pt has history of APRIL at [facility] and [facility]. Pt also has history of HHC. It sounds like the plan is for pt to return home w/ family at discharge. Pt going for procedure later on this evening.     SW to continue to follow for discharge planning needs. Emotional support provided.      &  to remain available and supportive for discharge planning needs.    Shyanne Dave, MSW, LSW  Discharge Planner  [phone]

## 2024-02-16 VITALS
OXYGEN SATURATION: 96 % | SYSTOLIC BLOOD PRESSURE: 105 MMHG | BODY MASS INDEX: 27.44 KG/M2 | HEART RATE: 74 BPM | DIASTOLIC BLOOD PRESSURE: 49 MMHG | HEIGHT: 60 IN | RESPIRATION RATE: 18 BRPM | TEMPERATURE: 98 F | WEIGHT: 139.75 LBS

## 2024-02-16 RX ORDER — PANTOPRAZOLE SODIUM 40 MG/1
40 TABLET, DELAYED RELEASE ORAL
Qty: 30 TABLET | Refills: 0 | Status: SHIPPED | OUTPATIENT
Start: 2024-02-17

## 2024-02-16 RX ORDER — HYDROCODONE BITARTRATE AND ACETAMINOPHEN 5; 325 MG/1; MG/1
1 TABLET ORAL EVERY 6 HOURS PRN
Qty: 12 TABLET | Refills: 0 | Status: SHIPPED | OUTPATIENT
Start: 2024-02-16

## 2024-02-16 RX ORDER — MIDODRINE HYDROCHLORIDE 5 MG/1
10 TABLET ORAL
Status: COMPLETED | OUTPATIENT
Start: 2024-02-16 | End: 2024-02-16

## 2024-02-16 RX ORDER — CEPHALEXIN 500 MG/1
500 CAPSULE ORAL 2 TIMES DAILY
Qty: 14 CAPSULE | Refills: 0 | Status: SHIPPED | OUTPATIENT
Start: 2024-02-16 | End: 2024-02-23

## 2024-02-16 RX ADMIN — MIDODRINE HYDROCHLORIDE 10 MG: 5 TABLET ORAL at 12:18:00

## 2024-02-16 RX ADMIN — ALLOPURINOL 300 MG: 300 TABLET ORAL at 08:59:00

## 2024-02-16 RX ADMIN — CALCIUM ACETATE 667 MG: 667 CAPSULE ORAL at 08:59:00

## 2024-02-16 RX ADMIN — CINACALCET 30 MG: 30 TABLET, FILM COATED ORAL at 08:51:00

## 2024-02-16 RX ADMIN — CLOPIDOGREL BISULFATE 75 MG: 75 TABLET ORAL at 08:51:00

## 2024-02-16 RX ADMIN — HYDROCODONE BITARTRATE AND ACETAMINOPHEN 2 TABLET: 5; 325 TABLET ORAL at 16:32:00

## 2024-02-16 RX ADMIN — HYDROCODONE BITARTRATE AND ACETAMINOPHEN 2 TABLET: 5; 325 TABLET ORAL at 01:25:00

## 2024-02-16 RX ADMIN — HYDROCODONE BITARTRATE AND ACETAMINOPHEN 2 TABLET: 5; 325 TABLET ORAL at 11:30:00

## 2024-02-16 RX ADMIN — PANTOPRAZOLE SODIUM 40 MG: 40 TABLET, DELAYED RELEASE ORAL at 05:25:00

## 2024-02-16 RX ADMIN — ASPIRIN 81 MG: 81 TABLET ORAL at 08:51:00

## 2024-02-16 RX ADMIN — LIDOCAINE AND PRILOCAINE: 25; 25 CREAM TOPICAL at 12:08:00

## 2024-02-16 RX ADMIN — FENOFIBRATE 67 MG: 67 CAPSULE ORAL at 08:51:00

## 2024-02-16 NOTE — PROGRESS NOTES
[facility]    PODIATRY PROGRESS NOTE    Maggie Pelletier Patient Status:  Inpatient    1945 MRN [id number]   Location [facility] 8NE-A Attending Oleg Addison MD   Hosp Day # 3 PCP LUZ MARIA BLACKBURN     Date of Admission:  2024    History of Present Illness:  POD #2 right second metatarsal head resection with excision of right foot ulcerations with primary closure (DOS: 2/15/2024).  Patient seen resting comfortably this morning.  Denies any overnight events and states that her pain is well-controlled to the surgical foot.  She states that she has kept her dressings clean, dry, and intact.  No other concerns at this time.    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 She has never used smokeless tobacco. She reports that she does not currently use alcohol. She reports that she does not use drugs.    Allergies:  Allergies   Allergen Reactions    Epinephrine OTHER (SEE COMMENTS)     Dental epi. Pain for 2 weeks after dental procedure.       Medications:    Current Facility-Administered Medications:     [COMPLETED] epoetin rigoberto (Epogen, Procrit) 49694 UNIT/ML injection 10,000 Units, 10,000 Units, Intravenous, Once in dialysis    sodium chloride 0.9 % IV bolus 100 mL, 100 mL, Intravenous, Q30 Min PRN **AND** albumin human (Albumin) 25% injection 25 g, 25 g,  Intravenous, PRN Dialysis    ondansetron (Zofran) 4 MG/2ML injection 4 mg, 4 mg, Intravenous, Q6H PRN    heparin (Porcine) 5000 UNIT/ML injection 5,000 Units, 5,000 Units, Subcutaneous, 2 times per day    acetaminophen (Tylenol) tab 650 mg, 650 mg, Oral, Q4H PRN **OR** HYDROcodone-acetaminophen (Norco) 5-325 MG per tab 1 tablet, 1 tablet, Oral, Q4H PRN **OR** HYDROcodone-acetaminophen (Norco) 5-325 MG per tab 2 tablet, 2 tablet, Oral, Q4H PRN    clopidogrel (Plavix) tab 75 mg, 75 mg, Oral, Daily    polyethylene glycol (PEG 3350) (Miralax) 17 g oral packet 17 g, 17 g, Oral, Daily PRN    sennosides (Senokot) tab 17.2 mg, 17.2 mg, Oral, Nightly PRN    bisacodyl (Dulcolax) 10 MG rectal suppository 10 mg, 10 mg, Rectal, Daily PRN    pantoprazole (Protonix) 40 mg in sodium chloride 0.9% PF 10 mL IV push, 40 mg, Intravenous, QAM AC **OR** pantoprazole (Protonix) DR tab 40 mg, 40 mg, Oral, QAM AC    allopurinol (Zyloprim) tab 300 mg, 300 mg, Oral, Daily    aspirin DR tab 81 mg, 81 mg, Oral, Daily    atorvastatin (Lipitor) tab 10 mg, 10 mg, Oral, Nightly    calcium acetate (Phoslo) cap 667 mg, 1 capsule, Oral, TID CC    carvedilol (Coreg) tab 6.25 mg, 6.25 mg, Oral, BID with meals    fenofibrate micronized (Lofibra) cap 67 mg, 67 mg, Oral, Daily with breakfast    ceFAZolin (Ancef) 2 g in 20mL IV syringe premix, 2 g, Intravenous, Q24H    cinacalcet (Sensipar) tab 30 mg, 30 mg, Oral, Daily with breakfast    Review of Systems:  10 point ROS completed and was negative, except for pertinent positive and negatives stated in subjective.    Physical Exam:  Dressings left intact today.  No strikethrough noted.      Imaging:  XR FLUOROSCOPY C-ARM TIME LESS THAN 1 HOUR (CPT=76000)    Result Date: 2/15/2024  CONCLUSION:  Intraoperative fluoroscopic guidance for foot surgery as above.   LOCATION:  [id number]    Dictated by (CST): Ovidio Daniel MD on 2/15/2024 at 9:12 PM     Finalized by (CST): Ovidio Daniel MD on 2/15/2024 at 9:13 PM        US ARTERIAL DUPLEX LOWER EXTREMITY RIGHT (CPT=93926)    Result Date: 2/14/2024  CONCLUSION:  Imaging findings demonstrate occlusion of the proximal/mid right posterior tibial artery with reconstitution of flow at the distal right posterior tibial artery.  A stenosis is noted at the proximal right SHA.  Irregular heart rate.  Please see above for further details.   LOCATION:  [id number]    Dictated by (CST): Stromberg, LeRoy, MD on 2/14/2024 at 3:46 PM     Finalized by (CST): Stromberg, LeRoy, MD on 2/14/2024 at 3:51 PM         Laboratory Data:  Recent Labs   Lab 02/11/24  1003 02/13/24  1658 02/14/24  0524   RBC 3.13*   < > 2.67*   HGB 11.8*   < > 10.5*   HCT 34.4*   < > 29.4*   .9*   < > 110.1*   MCH 37.7*   < > 39.3*   MCHC 34.3   < > 35.7   RDW 16.5   < > 16.2   NEPRELIM 3.29  --   --    WBC 6.1   < > 7.7   .0*   < > 106.0*    < > = values in this interval not displayed.       Impression:  Patient Active Problem List   Diagnosis    Gangrene of toe of right foot (HCC)    Ischemic pain of foot, right    PAD (peripheral artery disease) (HCC)    Great toe amputation status, right    Gangrene of right foot (HCC)    ESRD (end stage renal disease) (HCC)    Primary hypertension    ESRD on hemodialysis (HCC)    Carotid stenosis, right    Osteomyelitis of right foot (HCC)    Ulcer of right foot, unspecified ulcer stage (HCC)    CLL (chronic lymphocytic leukemia) (HCC)    Status post amputation of lesser toe of right foot (HCC)    Gangrene of right lower extremity due to atherosclerosis (HCC)    Right foot ulcer, with necrosis of bone (HCC)         Plan:  POD #1 from right second metatarsal head resection with excision of right foot ulcerations with primary closure    Patient is resting comfortably with no concerns.  Dressings are clean, dry, intact.  Left dressings intact today.      Patient should remain off of her surgical foot is much as possible.  Okay for patient to weight-bear in a postop shoe with  pressure to her heel to go to the bathroom only.    Patient currently on IV cefazolin per infectious disease.  Surgical cultures pending.  Appreciate outpatient recommendations.    From podiatry standpoint, patient is stable for discharge.  She does have an appointment with myself and wound care center on Monday.  She will leave her dressings clean, dry, and intact until then.    Please contact on-call physician with any questions or concerns.    Kev Ba DPM   2/16/2024  12:27 PM

## 2024-02-16 NOTE — ANESTHESIA POSTPROCEDURE EVALUATION
[facility]    Maggie Pelletier Patient Status:  Inpatient   Age/Gender 78 year old female MRN [id number]   Location [facility] POST ANESTHESIA CARE UNIT Attending Oleg Addison MD   Hosp Day # 2 PCP LUZ MARIA BLACKBURN       Anesthesia Post-op Note    RIGHT 2ND METATARSAL HEAD RESECTION, EXCISION OF ULCERATIONS WITH PRIMARY CLOSURE OF RIGHT FOOT    Procedure Summary       Date: 02/15/24 Room / Location:  MAIN OR 05 /  MAIN OR    Anesthesia Start: 1858 Anesthesia Stop: 2030    Procedure: RIGHT 2ND METATARSAL HEAD RESECTION, EXCISION OF ULCERATIONS WITH PRIMARY CLOSURE OF RIGHT FOOT (Right: Foot) Diagnosis:       Infection      (Infection [B99.9])    Surgeons: Colten Ba DPM Anesthesiologist: Joe Valerio MD    Anesthesia Type: MAC ASA Status: 3            Anesthesia Type: MAC    Vitals Value Taken Time   /46 02/15/24 2032   Temp 97 02/15/24 2032   Pulse 70 02/15/24 2032   Resp 16 02/15/24 2032   SpO2 100 02/15/24 2032       Patient Location: PACU    Anesthesia Type: MAC    Airway Patency: patent    Postop Pain Control: adequate    Mental Status: preanesthetic baseline    Nausea/Vomiting: none    Cardiopulmonary/Hydration status: stable euvolemic    Complications: no apparent anesthesia related complications    Postop vital signs: stable    Dental Exam: Unchanged from Preop    Patient to be discharged from PACU when criteria met.

## 2024-02-16 NOTE — PROGRESS NOTES
[facility]     Hospitalist Progress Note     Maggie Pelletier Patient Status:  Observation    1945 MRN [id number]   Location [facility] 8NE-A Attending Oleg Addison MD   Hosp Day # 3 PCP LUZ MARIA BLACKBURN     Chief Complaint: HTN    Subjective:     Patient feeling well post procedure.  Denies fever, chills, n/v.  No cp or sob.  No other acute complaints.    Objective:    Review of Systems:   A comprehensive review of systems was completed; pertinent positive and negatives stated in subjective.    Vital signs:  Temp:  [97 °F (36.1 °C)-99.3 °F (37.4 °C)] 98.1 °F (36.7 °C)  Pulse:  [63-78] 75  Resp:  [14-22] 17  BP: (104-158)/() 104/52  SpO2:  [96 %-100 %] 96 %    Physical Exam:    General: No acute distress, pleasant   Respiratory: No wheezes, no rhonchi  Cardiovascular: S1, S2, regular rate and rhythm  Abdomen: Soft, Non-tender, non-distended, positive bowel sounds  Neuro: No new focal deficits.   Extremities: Erythema to right foot with + discharge         Diagnostic Data:    Labs:  Recent Labs   Lab 24  1003 24  1658 24  0524   WBC 6.1 7.1 7.7   HGB 11.8* 12.2 10.5*   .9* 113.6* 110.1*   .0* 116.0* 106.0*   INR  --  1.18  --        Recent Labs   Lab 24  0709 24  1658 24  1046   * 86 157*   BUN 40* 41* 53*   CREATSERUM 4.31* 4.61* 5.74*   CA 8.5 8.1* 7.7*   ALB  --  2.9*  --     137 134*   K 3.9 3.8 4.1   CL 99 99 100   CO2 33.0* 28.0 27.0   ALKPHO  --  89  --    AST  --  35  --    ALT  --  21  --    BILT  --  0.9  --    TP  --  6.3*  --        Estimated Creatinine Clearance: 5.8 mL/min (A) (based on SCr of 5.74 mg/dL (H)).    No results for input(s): \"TROP\", \"TROPHS\", \"CK\" in the last 168 hours.    Recent Labs   Lab 24  1658   PTP 15.1*   INR 1.18                  Microbiology    Hospital Encounter on 24   1. Blood Culture     Status: None (Preliminary result)    Collection Time: 24  4:59 PM    Specimen:  Blood,peripheral   Result Value Ref Range    Blood Culture Result No Growth 2 Days N/A         Imaging: Reviewed in Epic.    Medications:    epoetin rigoberto  10,000 Units Intravenous Once in dialysis    lidocaine-prilocaine   Topical Once in dialysis    midodrine  10 mg Oral Once in dialysis    heparin  5,000 Units Subcutaneous 2 times per day    clopidogrel  75 mg Oral Daily    pantoprazole  40 mg Intravenous QAM AC    Or    pantoprazole  40 mg Oral QAM AC    allopurinol  300 mg Oral Daily    aspirin  81 mg Oral Daily    atorvastatin  10 mg Oral Nightly    calcium acetate  1 capsule Oral TID CC    carvedilol  6.25 mg Oral BID with meals    fenofibrate micronized  67 mg Oral Daily with breakfast    ceFAZolin  2 g Intravenous Q24H    cinacalcet  30 mg Oral Daily with breakfast       Assessment & Plan:      # PAD s/p angiogram  -per Dr. Addison  -ASA, plavix, statin      #Osteomyelitis with cellulitis  -ID consulted  -Podiatry consulted  -cefazolin -> ID following for abx recs   -f/u Blood Cx -> NGTD  -S/p right 2nd metatarsal head resection, excision of right foot ulcer on 2/16    #ESRD on HD - HD per nephro    #CAD s/p remote CABG  ASA, Plavix, Coreg, Lipitor    #CLL  #h/o CEA   #Chronic thrombocytopenia       Steven Aquino MD    Supplementary Documentation:     Quality:  DVT Mechanical Prophylaxis:        DVT Pharmacologic Prophylaxis   Medication    heparin (Porcine) 5000 UNIT/ML injection 5,000 Units         DVT Pharmacologic prophylaxis: Aspirin 81 mg      Code Status: Full Code  Marin: No urinary catheter in place  Marin Duration (in days):   Central line:    WOLFGANG: 2/17/2024    Discharge is dependent on: surgery outcome  At this point Ms. Pelletier is expected to be discharge to: Home?    The 21st Century Cures Act makes medical notes like these available to patients in the interest of transparency. Please be advised this is a medical document. Medical documents are intended to carry relevant information, facts as evident,  and the clinical opinion of the practitioner. The medical note is intended as peer to peer communication and may appear blunt or direct. It is written in medical language and may contain abbreviations or verbiage that are unfamiliar.             **Certification      PHYSICIAN Certification of Need for Inpatient Hospitalization - Initial Certification    Patient will require inpatient services that will reasonably be expected to span two midnight's based on the clinical documentation in H+P.   Based on patients current state of illness, I anticipate that, after discharge, patient will require TBD.

## 2024-02-16 NOTE — ANESTHESIA PREPROCEDURE EVALUATION
PRE-OP EVALUATION    Patient Name: Maggie Pelletier    Admit Diagnosis: Right leg claudication (HCC) [I73.9]  Gangrene of right foot (HCC) [I96]    Pre-op Diagnosis: Infection [B99.9]    RIGHT 2ND METATARSAL HEAD RESECTION, EXCISION OF ULCERATIONS WITH PRIMARY CLOSURE OF RIGHT FOOT    Anesthesia Procedure: RIGHT 2ND METATARSAL HEAD RESECTION, EXCISION OF ULCERATIONS WITH PRIMARY CLOSURE OF RIGHT FOOT (Right: Foot)    Surgeon(s) and Role:     * Colten Ba DPM - Primary    Pre-op vitals reviewed.  Temp: 98.2 °F (36.8 °C)  Pulse: 70  Resp: 16  BP: 110/33  SpO2: 98 %  Body mass index is 27.3 kg/m².    Current medications reviewed.  Hospital Medications:  • [MAR Hold] epoetin rigoberto (Epogen, Procrit) 76229 UNIT/ML injection 10,000 Units  10,000 Units Intravenous Once in dialysis   • [MAR Hold] sodium chloride 0.9 % IV bolus 100 mL  100 mL Intravenous Q30 Min PRN    And   • [MAR Hold] albumin human (Albumin) 25% injection 25 g  25 g Intravenous PRN Dialysis   • [MAR Hold] lidocaine-prilocaine (Emla) 2.5-2.5 % cream   Topical Once in dialysis   • [MAR Hold] midodrine (ProAmatine) tab 10 mg  10 mg Oral Once in dialysis   • [COMPLETED] midodrine (ProAmatine) tab 10 mg  10 mg Oral Once in dialysis   • [COMPLETED] lidocaine-prilocaine (Emla) 2.5-2.5 % cream   Topical Once in dialysis   • [COMPLETED] epoetin rigoberto (Epogen, Procrit) 78591 UNIT/ML injection 10,000 Units  10,000 Units Intravenous Once in dialysis   • [COMPLETED] iodixanol (VISIPAQUE) 320 MG/ML injection 150 mL  150 mL Injection ONCE PRN   • [COMPLETED] lidocaine PF (Xylocaine-MPF) 1 % injection       • [COMPLETED] heparin (Porcine) 5000 UNIT/ML injection       • [COMPLETED] midazolam (Versed) 2 MG/2ML injection       • [COMPLETED] fentaNYL (Sublimaze) 50 mcg/mL injection       • [COMPLETED] heparin (Porcine) 5000 UNIT/ML injection       • [COMPLETED] midazolam (Versed) 2 MG/2ML injection       • [COMPLETED] fentaNYL (Sublimaze) 50 mcg/mL injection       •  [COMPLETED] clopidogrel (Plavix) 75 MG tab       • [MAR Hold] ondansetron (Zofran) 4 MG/2ML injection 4 mg  4 mg Intravenous Q6H PRN   • [MAR Hold] heparin (Porcine) 5000 UNIT/ML injection 5,000 Units  5,000 Units Subcutaneous 2 times per day   • [MAR Hold] acetaminophen (Tylenol) tab 650 mg  650 mg Oral Q4H PRN    Or   • [MAR Hold] HYDROcodone-acetaminophen (Norco) 5-325 MG per tab 1 tablet  1 tablet Oral Q4H PRN    Or   • [MAR Hold] HYDROcodone-acetaminophen (Norco) 5-325 MG per tab 2 tablet  2 tablet Oral Q4H PRN   • [MAR Hold] clopidogrel (Plavix) tab 75 mg  75 mg Oral Daily   • [MAR Hold] polyethylene glycol (PEG 3350) (Miralax) 17 g oral packet 17 g  17 g Oral Daily PRN   • [MAR Hold] sennosides (Senokot) tab 17.2 mg  17.2 mg Oral Nightly PRN   • [MAR Hold] bisacodyl (Dulcolax) 10 MG rectal suppository 10 mg  10 mg Rectal Daily PRN   • [MAR Hold] pantoprazole (Protonix) 40 mg in sodium chloride 0.9% PF 10 mL IV push  40 mg Intravenous QAM AC    Or   • [MAR Hold] pantoprazole (Protonix) DR tab 40 mg  40 mg Oral QAM AC   • [MAR Hold] allopurinol (Zyloprim) tab 300 mg  300 mg Oral Daily   • [MAR Hold] aspirin DR tab 81 mg  81 mg Oral Daily   • [MAR Hold] atorvastatin (Lipitor) tab 10 mg  10 mg Oral Nightly   • [MAR Hold] calcium acetate (Phoslo) cap 667 mg  1 capsule Oral TID CC   • [MAR Hold] carvedilol (Coreg) tab 6.25 mg  6.25 mg Oral BID with meals   • [MAR Hold] fenofibrate micronized (Lofibra) cap 67 mg  67 mg Oral Daily with breakfast   • ceFAZolin (Ancef) 2 g in 20mL IV syringe premix  2 g Intravenous Q24H   • [MAR Hold] cinacalcet (Sensipar) tab 30 mg  30 mg Oral Daily with breakfast       Outpatient Medications:     Medications Prior to Admission   Medication Sig Dispense Refill Last Dose   • loperamide 1 MG/7.5ML Oral Solution Take 15 mL (2 mg total) by mouth 3 (three) times daily as needed for Diarrhea.   2/12/2024   • gentamicin 0.1 % External Ointment Apply 1 Application topically 2 (two) times  daily. 30 g 0    • HYDROcodone-acetaminophen 5-325 MG Oral Tab Take 1 tablet by mouth every 4 (four) hours as needed. 15 tablet 0    • acetaminophen 325 MG Oral Tab Take 1 tablet (325 mg total) by mouth every 6 (six) hours as needed for Pain.   2024   • allopurinol 300 MG Oral Tab Take 1 tablet (300 mg total) by mouth daily.   2024   • aspirin 81 MG Oral Tab EC Take 1 tablet (81 mg total) by mouth daily.   2024   • atorvastatin 10 MG Oral Tab Take 1 tablet (10 mg total) by mouth nightly.   2024   • calcium acetate 667 MG Oral Cap Take 1 capsule (667 mg total) by mouth 3 (three) times daily with meals.   2024   • cinacalcet 30 MG Oral Tab Take 1 tablet (30 mg total) by mouth daily with breakfast.   2024   • clopidogrel 75 MG Oral Tab Take 1 tablet (75 mg total) by mouth daily.   2024   • fenofibrate 145 MG Oral Tab Take 1 tablet (145 mg total) by mouth daily.   2024   • imatinib 100 MG Oral Tab Take 3 tablets (300 mg total) by mouth daily.   2024   • Magnesium 500 MG Oral Tab Take by mouth.   2024   • cyanocobalamine 1000 MCG Oral Tab Take 1 tablet (1,000 mcg total) by mouth daily.   2024   • carvedilol 6.25 MG Oral Tab Take 1 tablet (6.25 mg total) by mouth 2 (two) times daily with meals. Per patient, nephrologist decreased to once daily.   2024   • [] cefdinir 300 MG Oral Cap Take 1 capsule (300 mg total) by mouth every other day for 10 days. Take capsule after dialysis treatment 5 capsule 0        Allergies: Epinephrine      Anesthesia Evaluation    Patient summary reviewed.    Anesthetic Complications  (-) history of anesthetic complications         GI/Hepatic/Renal             (+) chronic renal disease (dialysis M, W, F- last dialyzed yesterday) and ESRD and hemodialysis                   Cardiovascular      ECG reviewed.            (+) hypertension     (+) CAD    (+) CABG/stent                            Endo/Other    Negative endo/other ROS.                               Pulmonary    Negative pulmonary ROS.                       Neuro/Psych    Negative neuro/psych ROS.                                      Past Surgical History:   Procedure Laterality Date   • APPENDECTOMY     • AV FISTULA OR GRAFT ARTERIAL Left    • CABG     • CATH PERCUTANEOUS  TRANSLUMINAL CORONARY ANGIOPLASTY     • CHOLECYSTECTOMY     • OTHER Right     big toe amputation     Social History     Socioeconomic History   • Marital status:    Tobacco Use   • Smoking status: Former   • Smokeless tobacco: Never   Vaping Use   • Vaping Use: Never used   Substance and Sexual Activity   • Alcohol use: Not Currently     Comment: 1 drink yearly   • Drug use: No     History   Drug Use No     Available pre-op labs reviewed.  Lab Results   Component Value Date    WBC 7.7 02/14/2024    RBC 2.67 (L) 02/14/2024    HGB 10.5 (L) 02/14/2024    HCT 29.4 (L) 02/14/2024    .1 (H) 02/14/2024    MCH 39.3 (H) 02/14/2024    MCHC 35.7 02/14/2024    RDW 16.2 02/14/2024    .0 (L) 02/14/2024     Lab Results   Component Value Date     (L) 02/14/2024    K 4.1 02/14/2024     02/14/2024    CO2 27.0 02/14/2024    BUN 53 (H) 02/14/2024    CREATSERUM 5.74 (H) 02/14/2024     (H) 02/14/2024    CA 7.7 (L) 02/14/2024     Lab Results   Component Value Date    INR 1.18 02/13/2024         Airway      Mallampati: II  Mouth opening: 3 FB  TM distance: 4 - 6 cm  Neck ROM: limited Cardiovascular    Cardiovascular exam normal.  Rhythm: regular  Rate: normal  (-) murmur   Dental    Dentition appears grossly intact         Pulmonary    Pulmonary exam normal.  Breath sounds clear to auscultation bilaterally.               Other findings        ASA: 3   Plan: MAC  NPO status verified and patient meets guidelines.        Comment: MAC discussed.  All questions answered.  Patient expressed understanding and agrees to proceed.    Plan/risks discussed with: patient and child/children            Present on  Admission:  • Osteomyelitis of right foot (HCC)  • Anemia in ESRD (end-stage renal disease)  (HCC)

## 2024-02-16 NOTE — PROGRESS NOTES
[facility]     Nephrology Progress Note    Maggie Pelletier Patient Status:  Inpatient    1945 MRN [id number]   Location [facility] 8NE-A Attending Oleg Addison MD   Hosp Day # 3 PCP LUZ MARIA BLACKBURN       SUBJECTIVE:  Sitting up in chair, denies c/o        Physical Exam:   /52 (BP Location: Right arm)   Pulse 75   Temp 98.1 °F (36.7 °C) (Oral)   Resp 17   Ht 5' (1.524 m)   Wt 139 lb 12.4 oz (63.4 kg)   SpO2 96%   BMI 27.30 kg/m²   Temp (24hrs), Av °F (36.7 °C), Min:97 °F (36.1 °C), Max:99.3 °F (37.4 °C)       Intake/Output Summary (Last 24 hours) at 2024 1115  Last data filed at 2/15/2024 2030  Gross per 24 hour   Intake 420 ml   Output --   Net 420 ml     Last 3 Weights   02/15/24 0327 139 lb 12.4 oz (63.4 kg)   24 1724 147 lb 11.3 oz (67 kg)   24 1534 147 lb 11.3 oz (67 kg)   24 1512 145 lb (65.8 kg)   10/04/23 0600 144 lb (65.3 kg)   23 0040 138 lb (62.6 kg)   23 1826 138 lb (62.6 kg)   23 1502 138 lb (62.6 kg)   22 0600 163 lb 5.8 oz (74.1 kg)   22 0400 165 lb (74.8 kg)   22 1508 165 lb (74.8 kg)     General: Alert and oriented in no apparent distress.  HEENT: No scleral icterus, MMM  Neck: Supple, no JOE or thyromegaly  Cardiac: Regular rate and rhythm, S1, S2 normal, no murmur or rub  Lungs: Clear without wheezes, rales, rhonchi.    Abdomen: Soft, non-tender. + bowel sounds, no palpable organomegaly  Extremities: Without clubbing, cyanosis or edema. L arm AVF with bruit/thrill, R foot dressing in place  Neurologic:  moving all extremities  Skin: Warm and dry, no rash        Labs:     Recent Labs   Lab 24  1003 24  1658 24  0524   WBC 6.1 7.1 7.7   HGB 11.8* 12.2 10.5*   .9* 113.6* 110.1*   .0* 116.0* 106.0*   INR  --  1.18  --        Recent Labs   Lab 24  1003 24  0709 24  1658 24  1046    138 137 134*   K 4.4 3.9 3.8 4.1   CL 98 99 99 100   CO2 33.0*  33.0* 28.0 27.0   BUN 54* 40* 41* 53*   CREATSERUM 4.82* 4.31* 4.61* 5.74*   CA 7.9* 8.5 8.1* 7.7*   * 100* 86 157*       Recent Labs   Lab 02/13/24  1658   ALT 21   AST 35   ALB 2.9*       No results for input(s): \"PGLU\" in the last 168 hours.    Meds:           Impression/Plan:      #1.  ESRD- due to HTN.  HD to cont per usual MWF routine     #2.  Anemiia- due to ESRD. CHAVEZ for goal hgb 10-11 gms     #3.  PAD- s/p extensive intervention.  s/p OR on 2/15.  Ongoing multispecialty care    Questions/concerns were discussed with patient and/or family by bedside.      No objection to home at any time from nephrology standpoint    Steve Howell MD  2/16/2024  11:16 AM

## 2024-02-16 NOTE — BRIEF OP NOTE
Pre-Operative Diagnosis:   Right second metatarsal osteomyelitis  Diabetic foot ulcer, right foot     Post-Operative Diagnosis:   Right second metatarsal osteomyelitis  Diabetic foot ulcer, right foot     Procedure Performed:   Right second metatarsal head resection  Excision of right foot ulcerations with primary closure    Surgeon(s) and Role:     * Colten Ba DPM - Primary    Assistant(s):  None     Surgical Findings: Right foot cellulitis present.  Second metatarsal head necrotic in appearance and soft in nature. Appropriate bleeding noted throughout the procedure     Specimen:   Right second metatarsal head -- microbiology and pathology  Right second metatarsal clearance fragment -- pathology     Estimated Blood Loss: 10 cc    Dictation Number:  See Epic    Kev Ba DPM  2/15/2024  8:23 PM

## 2024-02-16 NOTE — PROGRESS NOTES
Progress Note  Maggie Pelletier Patient Status:  Inpatient    1945 MRN [id number]   Location [facility] 8NE-A Attending Oleg Addison MD   Hosp Day # 3 PCP LUZ MARIA BLACKBURN     Subjective:  No complaints    Objective:  /49 (BP Location: Right arm)   Pulse 74   Temp 98.2 °F (36.8 °C) (Oral)   Resp 18   Ht 5' (1.524 m)   Wt 139 lb 12.4 oz (63.4 kg)   SpO2 96%   BMI 27.30 kg/m²     Telemetry: afib      Intake/Output:    Intake/Output Summary (Last 24 hours) at 2024 1337  Last data filed at 2/15/2024 2030  Gross per 24 hour   Intake 420 ml   Output --   Net 420 ml       Last 3 Weights   02/15/24 0327 139 lb 12.4 oz (63.4 kg)   24 1724 147 lb 11.3 oz (67 kg)   24 1534 147 lb 11.3 oz (67 kg)   24 1512 145 lb (65.8 kg)   10/04/23 0600 144 lb (65.3 kg)   23 0040 138 lb (62.6 kg)   23 1826 138 lb (62.6 kg)   23 1502 138 lb (62.6 kg)       Labs:  Recent Labs   Lab 24  0709 24  16524  1046   * 86 157*   BUN 40* 41* 53*   CREATSERUM 4.31* 4.61* 5.74*   EGFRCR 10* 9* 7*   CA 8.5 8.1* 7.7*   ALB  --  2.9*  --     137 134*   K 3.9 3.8 4.1   CL 99 99 100   CO2 33.0* 28.0 27.0   ALKPHO  --  89  --    AST  --  35  --    ALT  --  21  --    BILT  --  0.9  --    TP  --  6.3*  --      Recent Labs   Lab 24  1003 24  1658 24  0524   RBC 3.13* 3.16* 2.67*   HGB 11.8* 12.2 10.5*   HCT 34.4* 35.9 29.4*   .9* 113.6* 110.1*   MCH 37.7* 38.6* 39.3*   MCHC 34.3 34.0 35.7   RDW 16.5 15.9 16.2   NEPRELIM 3.29  --   --    WBC 6.1 7.1 7.7   .0* 116.0* 106.0*         No results for input(s): \"TROP\", \"TROPHS\", \"CK\" in the last 168 hours.  Lab Results   Component Value Date    INR 1.18 2024    INR 1.19 2022    INR 1.01 2022       Diagnostics:     Review of Systems   Constitutional: Negative.   Cardiovascular: Negative.    Skin:  Positive for poor wound healing.       Physical Exam:    Gen: Alert,  oriented x 3, in no apparent distress  Heent: Pupils equal, reactive. Mucous membranes moist.   Cardiac: Regular rate and rhythm, normal S1,S2  Lungs: Clear to auscultation  Abd: Soft, non tender, non distended  Ext: No edema  Skin: Warm, dry          Medications:     heparin  5,000 Units Subcutaneous 2 times per day    clopidogrel  75 mg Oral Daily    pantoprazole  40 mg Intravenous QAM AC    Or    pantoprazole  40 mg Oral QAM AC    allopurinol  300 mg Oral Daily    aspirin  81 mg Oral Daily    atorvastatin  10 mg Oral Nightly    calcium acetate  1 capsule Oral TID CC    carvedilol  6.25 mg Oral BID with meals    fenofibrate micronized  67 mg Oral Daily with breakfast    ceFAZolin  2 g Intravenous Q24H    cinacalcet  30 mg Oral Daily with breakfast             Assessment:  Severe Pvd, right foot gangrene  POD # 1 right second metatarsal head resection and excision of right foot ulcerations with primary closure  2/13/24 angioplasty of the right peroneal artery, tibioperoneal trunk, R FSA per Dr. Addison   On ASA, plavix, statin   On iv antibiotics  CAD, remote CABG  On ASA, plavix, BB, statin   Hx of CEA  TAVR 2022  Preserved LVEF  Mean systolic gradient 13 mmhg  Moderate to severe MR, PASP 55 mmHg  ESRD on HD  CLL  Remote CEA  Chronic thrombocytopenia    Plan:  On ASA, Plavix, BB, statin   Cardiology will sign off. Please call with any questions or concerns.     Plan of care discussed with patient, RN.    DAVY Hays  2/16/2024  1:37 PM    Patient seen and examined independently.  Note reviewed and labs reviewed.  Agree with above assessment and plan.  Compensated cardiac status in patient with TAVR in 2022 and hx of remote CABG for severe native CAD.  Continue ASA and plavix.  On statin and oral BB.    JONNY Montemayor MD

## 2024-02-16 NOTE — PLAN OF CARE
Shift Note:  Assumed care of patient. Patient alert and oriented x4.   Patient on room air, denies difficulty breathing, lung sounds clear.   Denies any cardiac symptoms, NSR on tele. Denies pain at this time.   Continent of bowel and bladder, last BM 2/16.   Ambulates independently after set up with walker call light within reach, tolerating care well.     POC:  - HD today   - DC planning today or tomorrow

## 2024-02-16 NOTE — PROGRESS NOTES
INFECTIOUS DISEASE  PROGRESS NOTE            [facility]    Maggie Pelletier Patient Status:  Inpatient    1945 MRN [id number]   [facility] 8NE-A Attending Oleg Addison MD   Hosp Day # 3 PCP LUZ MARIA BLACKBURN     Antibiotics: cefazolin    Subjective:  Surgery yesterday    Objective:  Temp:  [97 °F (36.1 °C)-99.3 °F (37.4 °C)] 98.1 °F (36.7 °C)  Pulse:  [63-78] 75  Resp:  [] 17  BP: (104-158)/() 104/52  SpO2:  [96 %-100 %] 96 %      Vital signs:Temp:  [97 °F (36.1 °C)-99.3 °F (37.4 °C)] 98.1 °F (36.7 °C)  Pulse:  [63-78] 75  Resp:  [] 17  BP: (104-158)/() 104/52  SpO2:  [96 %-100 %] 96 %  Respiratory: Non labored, symmetric excursion, normal respirations     Musculoskeletal: foot dressed  Labs:     COVID-19 Lab Results    COVID-19  Lab Results   Component Value Date    COVID19 Not Detected 2022       Pro-Calcitonin  No results for input(s): \"PCT\" in the last 168 hours.    Cardiac  No results for input(s): \"TROP\", \"PBNP\" in the last 168 hours.    Creatinine Kinase  No results for input(s): \"CK\" in the last 168 hours.    Inflammatory Markers  No results for input(s): \"CRP\", \"ESTHER\", \"LDH\", \"DDIMER\" in the last 168 hours.    Recent Labs   Lab 24  1003 24  1658 24  0524   RBC 3.13*   < > 2.67*   HGB 11.8*   < > 10.5*   HCT 34.4*   < > 29.4*   .9*   < > 110.1*   MCH 37.7*   < > 39.3*   MCHC 34.3   < > 35.7   RDW 16.5   < > 16.2   NEPRELIM 3.29  --   --    WBC 6.1   < > 7.7   .0*   < > 106.0*    < > = values in this interval not displayed.         Recent Labs   Lab 24  0709 24  1658 24  1046   * 86 157*   BUN 40* 41* 53*   CREATSERUM 4.31* 4.61* 5.74*   CA 8.5 8.1* 7.7*   ALB  --  2.9*  --     137 134*   K 3.9 3.8 4.1   CL 99 99 100   CO2 33.0* 28.0 27.0   ALKPHO  --  89  --    AST  --  35  --    ALT  --  21  --    BILT  --  0.9  --    TP  --  6.3*  --            Problem list reviewed:  Patient Active  Problem List   Diagnosis    Gangrene of toe of right foot (HCC)    Ischemic pain of foot, right    PAD (peripheral artery disease) (HCC)    Great toe amputation status, right    Gangrene of right foot (HCC)    ESRD (end stage renal disease) (HCC)    Primary hypertension    Anemia in ESRD (end-stage renal disease)  (HCC)    Carotid stenosis, right    Osteomyelitis of right foot (HCC)    Ulcer of right foot, unspecified ulcer stage (HCC)    CLL (chronic lymphocytic leukemia) (HCC)    Status post amputation of lesser toe of right foot (HCC)    Gangrene of right lower extremity due to atherosclerosis (HCC)    Right foot ulcer, with necrosis of bone (HCC)             ASSESSMENT/PLAN:  1. Osteomyelitis right 2nd MT  Open wound probes through, previous 1st/2ndt toe amputation    -SP angio procedure 2/13  -SP MT resection, dw podiatry, felt adequate resection    PO antibiotics for dc  Advised patient to call for final culture    2. ESRD HD      Chele Crowe MD, MD [facility]  [phone]

## 2024-02-16 NOTE — PROGRESS NOTES
Discharge Note:     Patient tele discontinued, IV discontinued with catheter intact.  Patient discharge instructions reviewed with patient and daughter, verbalize understanding.   Patient escorted via wheelchair to the [facility] by this RN.

## 2024-02-19 ENCOUNTER — OFFICE VISIT (OUTPATIENT)
Dept: WOUND CARE | Facility: HOSPITAL | Age: 79
End: 2024-02-19
Attending: NURSE PRACTITIONER
Payer: MEDICARE

## 2024-02-19 VITALS
SYSTOLIC BLOOD PRESSURE: 102 MMHG | TEMPERATURE: 98 F | DIASTOLIC BLOOD PRESSURE: 45 MMHG | RESPIRATION RATE: 16 BRPM | HEART RATE: 84 BPM

## 2024-02-19 DIAGNOSIS — Z89.421 STATUS POST AMPUTATION OF LESSER TOE OF RIGHT FOOT (HCC): ICD-10-CM

## 2024-02-19 DIAGNOSIS — L97.529 ULCER OF TOE, LEFT, WITH UNSPECIFIED SEVERITY (HCC): ICD-10-CM

## 2024-02-19 DIAGNOSIS — Z89.421 HISTORY OF PARTIAL RAY AMPUTATION OF SECOND TOE OF RIGHT FOOT (HCC): ICD-10-CM

## 2024-02-19 DIAGNOSIS — Z98.890 POST-OPERATIVE STATE: Primary | ICD-10-CM

## 2024-02-19 DIAGNOSIS — L97.516 NON-PRESSURE CHRONIC ULCER OF OTHER PART OF RIGHT FOOT WITH BONE INVOLVEMENT WITHOUT EVIDENCE OF NECROSIS (HCC): ICD-10-CM

## 2024-02-19 PROCEDURE — 99024 POSTOP FOLLOW-UP VISIT: CPT | Performed by: PODIATRIST

## 2024-02-19 NOTE — PROGRESS NOTES
Weekly Wound Education Note    Teaching Provided To: Patient  Training Topics: Dressing;Edema control;Cleasing and general instructions;Discharge instructions  Training Method: Demonstration;Explain/Verbal;Written  Training Response: Patient responds and understands        Notes: Left 5th toe: betadine, bandaid. Right foot surgical wound: betadine gauze, kerlix, Ace wrap, change daily. Reviewed s/s acute infection and to seek immediate medical attention if concerns arise.

## 2024-02-20 NOTE — PROGRESS NOTES
Subjective   Maggie Pelletier is a 78 year old female.    Chief Complaint   Patient presents with    Wound Care     Patient arrives for a wound care follow up appointment. Patient arrives wearing an offloading shoe and Ace wrap to the right leg. Patient complains of pain and possibly has a new wound on the left.        HPI  Maggie Pelletier is a 78 year old female with end-stage renal disease on dialysis, PAD, and CLL who is status post right second metatarsal head resection with excision of right foot ulcerations with primary closure (DOS: 2/15/2024).  Patient is kept her surgical dressings clean, dry, intact.  She has been nonweightbearing to right lower extremity.  She does relate having pain to the surgical site and states that she was not provided with outpatient pain medications when discharged.  No other concerns today.    Review of Systems  Denies nausea, vomiting, fever, chills, shortness of breath, chest pain, and calf pain.    Objective    Physical Exam:    Derm:  Wound Assessment  Wound 02/19/24 #3 Left Fifth Toe Left (Active)   Wound Image   02/19/24 1508   Drainage Amount None 02/19/24 1508   Wound Length (cm) 0.1 cm 02/19/24 1508   Wound Width (cm) 0.1 cm 02/19/24 1508   Wound Surface Area (cm^2) 0.01 cm^2 02/19/24 1508   Wound Depth (cm) 0.1 cm 02/19/24 1508   Wound Volume (cm^3) 0.001 cm^3 02/19/24 1508   Margins Well-defined edges 02/19/24 1508   Non-staged Wound Description Full thickness 02/19/24 1508   Wound Granulation Tissue Pale Grey;Pink 02/19/24 1508   Wound Bed Granulation (%) 100 % 02/19/24 1508   Wound Odor None 02/19/24 1508       Wound 02/19/24 #4 Right Great Toe Dorsal;Right (Active)   Wound Image    02/19/24 1509   Drainage Amount Large 02/19/24 1509   Drainage Description Serosanguineous 02/19/24 1509   Wound Length (cm) 5 cm 02/19/24 1509   Wound Width (cm) 0.1 cm 02/19/24 1509   Wound Surface Area (cm^2) 0.5 cm^2 02/19/24 1509   Wound Depth (cm) 0.1 cm 02/19/24 1509   Wound  Volume (cm^3) 0.05 cm^3 02/19/24 1509   Margins Well-defined edges 02/19/24 1509   Non-staged Wound Description Full thickness 02/19/24 1509   Mya-wound Assessment Edema 02/19/24 1509   Wound Granulation Tissue Red;Spongy 02/19/24 1509   Wound Bed Granulation (%) 10 % 02/19/24 1509   Wound Bed Epithelium (%) 90 % 02/19/24 1509   Wound Odor None 02/19/24 1509   Shape Sutures noted 02/19/24 1509       Vascular: pedal pulses are nonpalpable. CFT <5 sec to digits  Musculoskeletal: no acute deformities noted.  Status post right second metatarsal head resection  Neurological: Gross sensation intact via light touch.  Protective sensation diminished via Ipswitch test.    Vital Signs  Vital Signs    02/19/24 1500   BP: 102/45   Pulse: 84   Resp: 16   Temp: 98 °F (36.7 °C)   PainSc: 7 - (Severe)         Allergies  Allergies   Allergen Reactions    Epinephrine OTHER (SEE COMMENTS)     Dental epi. Pain for 2 weeks after dental procedure.       Assessment    Encounter Diagnosis  1. Post-operative state    2. History of partial ray amputation of second toe of right foot ([facility])    3. 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 Ulcer of toe, left, with unspecified severity ([facility])        Problem List  Patient Active Problem List   Diagnosis    Gangrene of toe of right foot ([facility])    Ischemic pain of foot, right    PAD (peripheral artery disease) ([facility])    Great toe amputation status, right    Gangrene of right foot (HCC)    ESRD (end stage renal disease) (HCC)    Primary hypertension    ESRD on hemodialysis (HCC)    Carotid stenosis, right    Osteomyelitis of right foot (HCC)    Ulcer of right foot, unspecified ulcer stage (HCC)    CLL (chronic lymphocytic leukemia) ([facility])    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        -Patient examined, chart history reviewed.    -Patient is status post right second metatarsal head resection with excision of dorsal and plantar ulcerations of the right foot with primary closure    Examined surgical sites today.  Incisions are well-approximated with sutures intact today.  Left sutures intact today.  Minimal surrounding erythema, no purulent drainage, no other signs of infection    Examined left foot.  Patient does have a stable eschar noted to plantar aspect of left fifth digit with no current signs of infection.  Due to patient's known severe PAD, recommend monitoring the site at this time.    Right foot surgical wounds and left fifth digit wound covered with Betadine soaked gauze and dry sterile dressing.  Patient will keep her right foot dressings clean, dry, and intact until her next follow-up.  Okay for patient to change left foot dressings every 2-3 days.    Patient will remain nonweightbearing to her right foot at this time in postop shoe.    -Educated on signs of infection and encouraged patient to seek immediate medical attention if noticing any of these signs.    Follow-Up  1 week    Kev Ba DPM    2/19/2024    Dragon speech recognition software was used to prepare this note.  Errors in word recognition may occur.  Please contact me with any questions/concerns with this note.

## 2024-02-26 ENCOUNTER — APPOINTMENT (OUTPATIENT)
Dept: WOUND CARE | Facility: HOSPITAL | Age: 79
End: 2024-02-26
Attending: NURSE PRACTITIONER
Payer: MEDICARE

## 2024-02-26 VITALS
RESPIRATION RATE: 17 BRPM | HEART RATE: 78 BPM | TEMPERATURE: 99 F | SYSTOLIC BLOOD PRESSURE: 108 MMHG | DIASTOLIC BLOOD PRESSURE: 49 MMHG

## 2024-02-26 DIAGNOSIS — Z98.890 POST-OPERATIVE STATE: Primary | ICD-10-CM

## 2024-02-26 DIAGNOSIS — I73.9 PAD (PERIPHERAL ARTERY DISEASE) (HCC): ICD-10-CM

## 2024-02-26 DIAGNOSIS — N18.6 ESRD (END STAGE RENAL DISEASE) (HCC): ICD-10-CM

## 2024-02-26 DIAGNOSIS — M79.671 BILATERAL FOOT PAIN: ICD-10-CM

## 2024-02-26 DIAGNOSIS — Z89.421 STATUS POST AMPUTATION OF LESSER TOE OF RIGHT FOOT (HCC): ICD-10-CM

## 2024-02-26 DIAGNOSIS — Z89.421 HISTORY OF PARTIAL RAY AMPUTATION OF SECOND TOE OF RIGHT FOOT (HCC): ICD-10-CM

## 2024-02-26 DIAGNOSIS — L97.529 ULCER OF TOE, LEFT, WITH UNSPECIFIED SEVERITY (HCC): ICD-10-CM

## 2024-02-26 DIAGNOSIS — M79.672 BILATERAL FOOT PAIN: ICD-10-CM

## 2024-02-26 PROCEDURE — 99213 OFFICE O/P EST LOW 20 MIN: CPT

## 2024-02-26 NOTE — PROGRESS NOTES
Subjective   Maggie Pelletier is a 78 year old female.    Chief Complaint   Patient presents with    Wound Care     Patient is here for follow up and has no new concerns.        HPI  Maggie Pelletier is a 78 year old female with end-stage renal disease on dialysis, PAD, and CLL who is status post right second metatarsal head resection with excision of right foot ulcerations with primary closure (DOS: 2/15/2024).  Patient is kept her dressings clean, dry, and intact to the right lower extremity.  She has remained nonweightbearing and is in a wheelchair today.  She also has surgical shoes to bilateral feet.  Patient is relating pain to her left foot.  She does have an ongoing ulceration to her left fifth digit.  Denies noticing any signs of infection to the left foot.  No other concerns today.    Review of Systems  Denies nausea, vomiting, fever, chills, shortness of breath, chest pain, and calf pain.    Objective    Physical Exam:    Derm:  Wound Assessment  Wound 02/19/24 #3 Left Fifth Toe Left (Active)   Wound Image    02/26/24 1500   Drainage Amount KELLY 02/26/24 1500   Wound Length (cm) 0.1 cm 02/26/24 1500   Wound Width (cm) 0.1 cm 02/26/24 1500   Wound Surface Area (cm^2) 0.01 cm^2 02/26/24 1500   Wound Depth (cm) 0.1 cm 02/26/24 1500   Wound Volume (cm^3) 0.001 cm^3 02/26/24 1500   Wound Healing % 0 02/26/24 1500   Margins Well-defined edges 02/26/24 1500   Non-staged Wound Description Full thickness 02/26/24 1500   Mya-wound Assessment Dry;Edema 02/26/24 1500   Wound Granulation Tissue Pale Grey;Pink 02/19/24 1508   Wound Bed Granulation (%) 100 % 02/19/24 1508   Wound Odor None 02/26/24 1500   Shape 100% scabbed 02/26/24 1500       Wound 02/19/24 #4 Right Great Toe Dorsal;Right (Active)   Wound Image    02/26/24 1502   Drainage Amount Moderate 02/26/24 1502   Drainage Description Serosanguineous 02/26/24 1502   Wound Length (cm) 5.5 cm 02/26/24 1502   Wound Width (cm) 0.1 cm 02/26/24 1502   Wound Surface  Area (cm^2) 0.55 cm^2 02/26/24 1502   Wound Depth (cm) 0.1 cm 02/26/24 1502   Wound Volume (cm^3) 0.055 cm^3 02/26/24 1502   Wound Healing % -10 02/26/24 1502   Margins Well-defined edges 02/26/24 1502   Non-staged Wound Description Full thickness 02/26/24 1502   Mya-wound Assessment Edema;Dry 02/26/24 1502   Wound Granulation Tissue Red;Spongy 02/26/24 1502   Wound Bed Granulation (%) 5 % 02/26/24 1502   Wound Bed Epithelium (%) 95 % 02/26/24 1502   Wound Odor None 02/26/24 1502   Shape Sutures noted 02/26/24 1502       Vascular: pedal pulses are nonpalpable bilaterally. CFT <5 sec to digits.  Minimal CFT to the left fifth digit with some duskiness noted  Musculoskeletal: no acute deformities noted.  Status post right second metatarsal head resection  Neurological: Gross sensation intact via light touch.  Protective sensation diminished via Ipswitch test.    Vital Signs  Vital Signs    02/26/24 1457   BP: 108/49   Pulse: 78   Resp: 17   Temp: 98.8 °F (37.1 °C)   PainSc: 6 - (Moderate)         Allergies  Allergies   Allergen Reactions    Epinephrine OTHER (SEE COMMENTS)     Dental epi. Pain for 2 weeks after dental procedure.       Assessment    Encounter Diagnosis  1. Post-operative state    2. History of partial ray amputation of second toe of right foot ([facility])    3. Status post amputation of lesser toe of right foot ([facility])    4. Ulcer of toe, left, with unspecified severity ([facility])    5. ESRD (end stage renal disease) ([facility])    6. PAD (peripheral artery disease) ([facility])    7. Bilateral foot pain        Problem List  Patient Active Problem List   Diagnosis    Gangrene of toe of right foot ([facility])    Ischemic pain of foot, right    PAD (peripheral artery disease) ([facility])    Great toe amputation status, right    Gangrene of right foot ([facility])    ESRD (end stage renal disease) ([facility])    Primary hypertension    ESRD on hemodialysis ([facility])    Carotid stenosis, right    Osteomyelitis of right foot ([facility])    Ulcer of right foot,  unspecified ulcer stage (HCC)    CLL (chronic lymphocytic leukemia) (HCC)    Status post amputation of lesser toe of right foot (HCC)    Gangrene of right lower extremity due to atherosclerosis (HCC)    Right foot ulcer, with necrosis of bone (HCC)       Plan  Orders:  No orders of the defined types were placed in this encounter.        -Patient examined, chart history reviewed.    -Patient is status post right second metatarsal head resection with excision of dorsal and plantar ulcerations of the right foot with primary closure     Examined surgical sites today.  Incisions are well-approximated with sutures intact today.  Small area near the third digit that is slightly opened, but sutures are intact.  I left sutures intact today and added Steri-Strips across the site that is slightly open.  Minimal, improved surrounding erythema, no purulent drainage, no other signs of infection     Examined left foot.  Patient does have a stable eschar noted to plantar aspect of left fifth digit with no current signs of infection.  Increased duskiness noted to the left fifth digit.  Due to patient's known severe PAD, I did contact Dr. dAdison, who is planning on adding patient onto his schedule.     Right foot surgical wounds and left fifth digit wound covered with Betadine soaked gauze and dry sterile dressing.  Patient will keep her right foot dressings clean, dry, and intact until her next follow-up.  Okay for patient to change left foot dressings every 2-3 days.     Patient will remain nonweightbearing to her right foot at this time in postop shoe.    -Educated on signs of infection and encouraged patient to seek immediate medical attention if noticing any of these signs.    Follow-Up  1 week    Kev Ba DPM    2/26/2024    Dragon speech recognition software was used to prepare this note.  Errors in word recognition may occur.  Please contact me with any questions/concerns with this note.

## 2024-03-04 ENCOUNTER — OFFICE VISIT (OUTPATIENT)
Dept: WOUND CARE | Facility: HOSPITAL | Age: 79
End: 2024-03-04
Attending: NURSE PRACTITIONER
Payer: MEDICARE

## 2024-03-04 VITALS
SYSTOLIC BLOOD PRESSURE: 136 MMHG | RESPIRATION RATE: 16 BRPM | HEART RATE: 79 BPM | DIASTOLIC BLOOD PRESSURE: 64 MMHG | TEMPERATURE: 98 F

## 2024-03-04 DIAGNOSIS — S91.204A: ICD-10-CM

## 2024-03-04 DIAGNOSIS — N18.6 ESRD (END STAGE RENAL DISEASE) (HCC): ICD-10-CM

## 2024-03-04 DIAGNOSIS — L97.529 ULCER OF TOE, LEFT, WITH UNSPECIFIED SEVERITY (HCC): ICD-10-CM

## 2024-03-04 DIAGNOSIS — Z89.421 STATUS POST AMPUTATION OF LESSER TOE OF RIGHT FOOT (HCC): ICD-10-CM

## 2024-03-04 DIAGNOSIS — Z89.421 HISTORY OF PARTIAL RAY AMPUTATION OF SECOND TOE OF RIGHT FOOT (HCC): ICD-10-CM

## 2024-03-04 DIAGNOSIS — I73.9 PAD (PERIPHERAL ARTERY DISEASE) (HCC): ICD-10-CM

## 2024-03-04 DIAGNOSIS — Z98.890 POST-OPERATIVE STATE: Primary | ICD-10-CM

## 2024-03-04 PROCEDURE — 99024 POSTOP FOLLOW-UP VISIT: CPT | Performed by: PODIATRIST

## 2024-03-04 PROCEDURE — 99213 OFFICE O/P EST LOW 20 MIN: CPT | Performed by: PODIATRIST

## 2024-03-04 NOTE — PROGRESS NOTES
Subjective   Maggie Pelletier is a 78 year old female.    Chief Complaint   Patient presents with    Wound Care     Patient is here for a wound care follow up. Her pain is currently 6/10.        HPI  Maggie Pelletier is a 78 year old female with end-stage renal disease on dialysis, PAD, and CLL who is status post right second metatarsal head resection with excision of right foot ulcerations with primary closure (DOS: 2/15/2024).  Patient states that she has kept her dressings to her right foot clean, dry, intact and has been changing the left fifth digit wound with Betadine and a Band-Aid.  Patient did not realize she was instructed to change her dressings on the right foot daily.  Patient has remained nonweightbearing to her right foot and is in a wheelchair today.  She rates her pain to her feet 6/10.  She states that she has an arterial ultrasound ordered and will be getting that done this week to evaluate the left lower extremity.  There is a possibility that Dr. Addison will move forward with an angiogram to the left lower extremity.  Patient denies any other concerns at this time.    Review of Systems  Denies nausea, vomiting, fever, chills, shortness of breath, chest pain, and calf pain.    Objective    Physical Exam:    Derm:  Wound Assessment  Wound 02/19/24 #3 Left Fifth Toe Left (Active)   Wound Image   03/04/24 1518   Drainage Amount None 03/04/24 1518   Wound Length (cm) 0.5 cm 03/04/24 1518   Wound Width (cm) 0.4 cm 03/04/24 1518   Wound Surface Area (cm^2) 0.2 cm^2 03/04/24 1518   Wound Depth (cm) 0 cm 03/04/24 1518   Wound Volume (cm^3) 0 cm^3 03/04/24 1518   Wound Healing % 100 03/04/24 1518   Margins Well-defined edges 03/04/24 1518   Non-staged Wound Description Full thickness 03/04/24 1518   Mya-wound Assessment Dry;Edema 03/04/24 1518   Wound Granulation Tissue Pale Grey;Pink 02/19/24 1508   Wound Bed Granulation (%) 100 % 02/19/24 1508   Wound Odor None 03/04/24 1518   Shape 100% scabbed  03/04/24 1518   Tunneling? No 03/04/24 1518   Undermining? No 03/04/24 1518   Sinus Tracts? No 03/04/24 1518       Wound 02/19/24 #4 Right Great Toe Dorsal;Right (Active)   Wound Image   03/04/24 1519   Drainage Amount Moderate 03/04/24 1519   Drainage Description Serosanguineous 03/04/24 1519   Wound Length (cm) 5.5 cm 03/04/24 1519   Wound Width (cm) 0.1 cm 03/04/24 1519   Wound Surface Area (cm^2) 0.55 cm^2 03/04/24 1519   Wound Depth (cm) 0.1 cm 02/26/24 1502   Wound Volume (cm^3) 0.055 cm^3 02/26/24 1502   Wound Healing % -10 02/26/24 1502   Margins Well-defined edges 03/04/24 1519   Non-staged Wound Description Full thickness 03/04/24 1519   Mya-wound Assessment Edema;Dry 03/04/24 1519   Wound Granulation Tissue Red;Spongy 02/26/24 1502   Wound Bed Granulation (%) 5 % 02/26/24 1502   Wound Bed Epithelium (%) 95 % 02/26/24 1502   Wound Odor None 03/04/24 1519   Shape Sutures noted 02/26/24 1502   Tunneling? No 03/04/24 1519   Undermining? No 03/04/24 1519   Sinus Tracts? No 03/04/24 1519       Wound 03/04/24 #6 Right 3rd toe Toe (Comment which one) (Active)   Wound Image   03/04/24 1559   Drainage Amount Small 03/04/24 1558   Drainage Description Yellow 03/04/24 1558   Wound Length (cm) 2.2 cm 03/04/24 1558   Wound Width (cm) 0.7 cm 03/04/24 1558   Wound Surface Area (cm^2) 1.54 cm^2 03/04/24 1558   Wound Depth (cm) 0.1 cm 03/04/24 1558   Wound Volume (cm^3) 0.154 cm^3 03/04/24 1558   Margins Well-defined edges 03/04/24 1558   Non-staged Wound Description Full thickness 03/04/24 1558   Mya-wound Assessment Edema;Blanchable erythema 03/04/24 1558   Wound Bed Granulation (%) 30 % 03/04/24 1558   Wound Bed Epithelium (%) 60 % 03/04/24 1558   Wound Bed Slough (%) 10 % 03/04/24 1558   Wound Odor None 03/04/24 1558     Integument: Right third digit toenail roughly 90% lysed from the nailbed with no current signs of infection.  Vascular: pedal pulses are nonpalpable bilaterally. CFT <5 sec to digits.  Minimal CFT to  the left fifth digit with some duskiness noted  Musculoskeletal: no acute deformities noted.  Status post right second metatarsal head resection  Neurological: Gross sensation intact via light touch.  Protective sensation diminished via Ipswitch test.    Vital Signs  Vital Signs    03/04/24 1509   BP: 136/64   Pulse: 79   Resp: 16   Temp: 97.6 °F (36.4 °C)   PainSc: 6 - (Moderate)         Allergies  Allergies   Allergen Reactions    Epinephrine OTHER (SEE COMMENTS)     Dental epi. Pain for 2 weeks after dental procedure.       Assessment    Encounter Diagnosis  1. Post-operative state    2. History of partial ray amputation of second toe of right foot (HCC)    3. Status post amputation of lesser toe of right foot (HCC)    4. Ulcer of toe, left, with unspecified severity (HCC)    5. ESRD (end stage renal disease) ([facility])    6. PAD (peripheral artery disease) (HCC)    7. Traumatic loss of toenail of lesser toe of right foot, initial encounter        Problem List  Patient Active Problem List   Diagnosis    Gangrene of toe of right foot (HCC)    Ischemic pain of foot, right    PAD (peripheral artery disease) (HCC)    Great toe amputation status, right    Gangrene of right foot (HCC)    ESRD (end stage renal disease) (HCC)    Primary hypertension    ESRD on hemodialysis (HCC)    Carotid stenosis, right    Osteomyelitis of right foot (HCC)    Ulcer of right foot, unspecified ulcer stage (HCC)    CLL (chronic lymphocytic leukemia) (HCC)    Status post amputation of lesser toe of right foot (HCC)    Gangrene of right lower extremity due to atherosclerosis (HCC)    Right foot ulcer, with necrosis of bone (HCC)       Plan  Orders:  No orders of the defined types were placed in this encounter.        -Patient examined, chart history reviewed.    -Patient is status post right second metatarsal head resection with excision of dorsal and plantar ulcerations of the right foot with primary closure    Examined surgical site today--all  sutures left intact today.  Plantar foot incision remains approximated with dry, stable eschar surrounding incision site.  Dorsal portion of the surgical wound appears slightly macerated near the base of the third toe with significant amount of dry, stable eschar/hyperkeratotic tissue.    Examined left foot--left fifth metatarsal eschar does appear enlarged today.  It continues to remain stable with no current signs of infection.  Some duskiness noted to the fifth digit itself    Right third digit toenail roughly 95% lysed from nailbed.  Toenail removed today without incident.  No current SOIs.    All wounds cleansed today and covered with Betadine soaked gauze and a dry dressing.  Will have patient change her dressings on a daily basis.    She should follow-up with Dr. Addison and determine if intervention is needed for the left lower extremity.  We will continue to monitor patient's surgical site.  Explained that the goal is to keep it on infected and dry at this time.    -Educated on signs of infection and encouraged patient to seek immediate medical attention if noticing any of these signs.    Follow-Up  1 week    Kev Ba DPM    3/4/2024    Dragon speech recognition software was used to prepare this note.  Errors in word recognition may occur.  Please contact me with any questions/concerns with this note.

## 2024-03-05 ENCOUNTER — HOSPITAL ENCOUNTER (OUTPATIENT)
Dept: ULTRASOUND IMAGING | Facility: HOSPITAL | Age: 79
Discharge: HOME | End: 2024-03-05
Attending: SURGERY
Payer: MEDICARE

## 2024-03-05 DIAGNOSIS — I70.263 ATHEROSCLEROSIS OF NATIVE ARTERIES OF EXTREMITIES WITH GANGRENE, BILATERAL LEGS (HCC): ICD-10-CM

## 2024-03-05 PROCEDURE — 93925 LOWER EXTREMITY STUDY: CPT | Performed by: SURGERY

## 2024-03-05 NOTE — PROGRESS NOTES
Weekly Wound Education Note    Teaching Provided To: Patient;Family  Training Topics: Dressing;Discharge instructions;Off-loading;Cleasing and general instructions  Training Method: Demonstration;Explain/Verbal;Written  Training Response: Patient responds and understands        Notes: Cleanse right great toe, right 3rd toe and left 5th toe with saline or wound cleanser apply Betadine moistened gauze to all wounds cover with dry dressing. Change daily.

## 2024-03-11 ENCOUNTER — OFFICE VISIT (OUTPATIENT)
Dept: WOUND CARE | Facility: HOSPITAL | Age: 79
End: 2024-03-11
Attending: NURSE PRACTITIONER
Payer: MEDICARE

## 2024-03-11 VITALS
SYSTOLIC BLOOD PRESSURE: 125 MMHG | HEART RATE: 93 BPM | TEMPERATURE: 98 F | RESPIRATION RATE: 17 BRPM | DIASTOLIC BLOOD PRESSURE: 76 MMHG

## 2024-03-11 DIAGNOSIS — N18.6 ESRD (END STAGE RENAL DISEASE) (HCC): ICD-10-CM

## 2024-03-11 DIAGNOSIS — Z89.421 HISTORY OF PARTIAL RAY AMPUTATION OF SECOND TOE OF RIGHT FOOT (HCC): Primary | ICD-10-CM

## 2024-03-11 DIAGNOSIS — L97.529 ULCER OF TOE, LEFT, WITH UNSPECIFIED SEVERITY (HCC): ICD-10-CM

## 2024-03-11 DIAGNOSIS — M79.671 BILATERAL FOOT PAIN: ICD-10-CM

## 2024-03-11 DIAGNOSIS — M79.672 BILATERAL FOOT PAIN: ICD-10-CM

## 2024-03-11 DIAGNOSIS — I73.9 PAD (PERIPHERAL ARTERY DISEASE) (HCC): ICD-10-CM

## 2024-03-11 DIAGNOSIS — S91.204D: ICD-10-CM

## 2024-03-11 PROCEDURE — 99214 OFFICE O/P EST MOD 30 MIN: CPT

## 2024-03-11 NOTE — PROGRESS NOTES
Subjective   Maggie Pelletier is a 78 year old female.    Chief Complaint   Patient presents with    Wound Care     Patient arrives for a wound care follow up appointment. Patient arrives wearing surgical shoes, gauze wrapped around the wounds, and daughter notes they were applying betadine to the wounds. Patient states she has some concern with swelling in the legs. Patient has moderate pain.        HPI  Maggie Pelletier is a 78 year old female with end-stage renal disease on dialysis, PAD, and CLL who is status post right second metatarsal head resection with excision of right foot ulcerations with primary closure (DOS: 2/15/2024).  Patient states that she is doing okay overall.  Patient has been changing her dressings to bilateral feet daily with Betadine and dry dressing.  Her left fifth digit remains stable with no current signs of infection.  There is still a scab to the site, patient is concerned in regards to her blood flow.  Patient does have a follow-up with Dr. Addison today following my visit to discuss recommendations to left lower extremity.  Patient does have concerns in regards to edema in her legs.  She is utilizing surgical shoes today and arrives in a wheelchair.  No other concerns at this time.    Review of Systems  Denies nausea, vomiting, fever, chills, shortness of breath, chest pain, and calf pain.    Objective    Physical Exam:    Derm:  Wound Assessment  Wound 02/19/24 #3 Left Fifth Toe Left (Active)   Wound Image   03/11/24 1501   Drainage Amount None 03/11/24 1501   Wound Length (cm) 0.4 cm 03/11/24 1501   Wound Width (cm) 0.3 cm 03/11/24 1501   Wound Surface Area (cm^2) 0.12 cm^2 03/11/24 1501   Wound Depth (cm) 0.1 cm 03/11/24 1501   Wound Volume (cm^3) 0.012 cm^3 03/11/24 1501   Wound Healing % -1100 03/11/24 1501   Margins Well-defined edges 03/11/24 1501   Non-staged Wound Description Full thickness 03/11/24 1501   Mya-wound Assessment Dry;Edema 03/04/24 1518   Wound Granulation  Tissue Pale Grey;[place] 02/19/24 1508   Wound Bed Granulation (%) 100 % 02/19/24 1508   Wound Odor None 03/11/24 1501   Shape 100% scabbed 03/11/24 1501   Tunneling? No 03/11/24 1501   Undermining? No 03/11/24 1501   Sinus Tracts? No 03/11/24 1501       Wound 02/19/24 #4 Right Great Toe Dorsal;Right (Active)   Wound Image    03/11/24 1500   Drainage Amount Small 03/11/24 1500   Drainage Description Serosanguineous 03/11/24 1500   Wound Length (cm) 3.2 cm 03/11/24 1500   Wound Width (cm) 0.1 cm 03/11/24 1500   Wound Surface Area (cm^2) 0.32 cm^2 03/11/24 1500   Wound Depth (cm) 0.1 cm 03/11/24 1500   Wound Volume (cm^3) 0.032 cm^3 03/11/24 1500   Wound Healing % 36 03/11/24 1500   Margins Well-defined edges 03/11/24 1500   Non-staged Wound Description Full thickness 03/11/24 1500   Mya-wound Assessment Edema;Dry 03/11/24 1500   Wound Granulation Tissue Red;Spongy 02/26/24 1502   Wound Bed Granulation (%) 5 % 02/26/24 1502   Wound Bed Epithelium (%) 95 % 02/26/24 1502   Wound Odor None 03/04/24 1519   Shape Sutures noted, unable to assess wound bed 03/11/24 1500   Tunneling? No 03/11/24 1500   Undermining? No 03/11/24 1500   Sinus Tracts? No 03/11/24 1500       Wound 03/04/24 #6 Right 3rd toe Toe (Comment which one) (Active)   Wound Image   03/11/24 1459   Drainage Amount None 03/11/24 1459   Drainage Description Yellow 03/04/24 1558   Wound Length (cm) 0.1 cm 03/11/24 1459   Wound Width (cm) 0.1 cm 03/11/24 1459   Wound Surface Area (cm^2) 0.01 cm^2 03/11/24 1459   Wound Depth (cm) 0.1 cm 03/11/24 1459   Wound Volume (cm^3) 0.001 cm^3 03/11/24 1459   Wound Healing % 99 03/11/24 1459   Margins Well-defined edges 03/11/24 1459   Non-staged Wound Description Full thickness 03/11/24 1459   Mya-wound Assessment Edema;Blanchable erythema 03/11/24 1459   Wound Granulation Tissue Pink;[place] 03/11/24 1459   Wound Bed Granulation (%) 100 % 03/11/24 1459   Wound Bed Epithelium (%) 60 % 03/04/24 1558   Wound Bed Slough (%)  10 % 03/04/24 1558   Wound Odor None 03/11/24 1459   Tunneling? No 03/11/24 1459   Undermining? No 03/11/24 1459   Sinus Tracts? No 03/11/24 1459       Integument: Right third digit nailbed remains stable with no current signs of infection.  Vascular: pedal pulses are nonpalpable bilaterally. CFT roughly 5 sec to digits.  Minimal CFT to the left fifth digit with some duskiness noted  Musculoskeletal: no acute deformities noted.  Status post right second metatarsal head resection  Neurological: Gross sensation intact via light touch.  Protective sensation diminished via Ipswitch test.    Vital Signs  Vital Signs    03/11/24 1504   BP: 125/76   Pulse: 93   Resp: 17   Temp: 98 °F (36.7 °C)   PainSc: 7 - (Severe)         Allergies  Allergies   Allergen Reactions    Epinephrine OTHER (SEE COMMENTS)     Dental epi. Pain for 2 weeks after dental procedure.       Assessment    Encounter Diagnosis  1. History of partial ray amputation of second toe of right foot ([facility])    2. Ulcer of toe, left, with unspecified severity ([facility])    3. Traumatic loss of toenail of lesser toe of right foot, subsequent encounter    4. PAD (peripheral artery disease) ([facility])    5. ESRD (end stage renal disease) ([facility])    6. Bilateral foot pain        Problem List  Patient Active Problem List   Diagnosis    Gangrene of toe of right foot ([facility])    Ischemic pain of foot, right    PAD (peripheral artery disease) ([facility])    Great toe amputation status, right    Gangrene of right foot ([facility])    ESRD (end stage renal disease) ([facility])    Primary hypertension    ESRD on hemodialysis ([facility])    Carotid stenosis, right    Osteomyelitis of right foot ([facility])    Ulcer of right foot, unspecified ulcer stage ([facility])    CLL (chronic lymphocytic leukemia) ([facility])    Status post amputation of lesser toe of right foot ([facility])    Gangrene of right lower extremity due to atherosclerosis ([facility])    Right foot ulcer, with necrosis of bone ([facility])       Plan  Orders:  No orders of the defined  types were placed in this encounter.        -Patient examined, chart history reviewed.    -Patient is status post right second metatarsal head resection with excision of dorsal and plantar ulcerations of the right foot with primary closure     Examined surgical site today--all sutures removed today without incident.  There remains an area of opening near the base of the third digit.  Plantar foot incision remains approximated with dry, stable eschar surrounding incision site.  Dorsal portion of the surgical wound appears slightly macerated near the base of the third toe with significant amount of dry, stable eschar/hyperkeratotic tissue.     Examined left foot--left fifth digit eschar does appear stable today.  No current signs of infection.  Some duskiness noted to the fifth digit itself     Right third digit nail bed is stable following traumatic avulsion.  No current SOIs.     All wounds cleansed today and covered with Betadine soaked gauze and a dry dressing.  Will have patient change her dressings on a daily basis.     She should follow-up with Dr. Addison and determine if intervention is needed for the left lower extremity.  We will continue to monitor patient's surgical site.  Explained that the goal is to keep it on infected and dry at this time.    -Educated on signs of infection and encouraged patient to seek immediate medical attention if noticing any of these signs.    Follow-Up  1 week    Kev Ba DPM    3/11/2024    Dragon speech recognition software was used to prepare this note.  Errors in word recognition may occur.  Please contact me with any questions/concerns with this note.

## 2024-03-12 NOTE — PROGRESS NOTES
Weekly Wound Education Note    Teaching Provided To: Patient  Training Topics: Dressing;Discharge instructions;Cleasing and general instructions  Training Method: Demonstration;Explain/Verbal;Written  Training Response: Patient responds and understands        Notes: Sutures removed from right foot, Steristrips in place. Apply betadine moistened gauze to right foot and to left 5th toe, cover with dry dressing, Change daily. Pt seeing Dr Addison today.

## 2024-03-13 VITALS — BODY MASS INDEX: 28.47 KG/M2 | HEIGHT: 60 IN | WEIGHT: 145 LBS

## 2024-03-13 NOTE — PAT NURSING NOTE
Pre Procedure Instructions LLE PV Angiogram with Dr. Addison/DEYANIRA on 3/19    Instructions discussed with patient's daughter, Heather. Verbalized understanding of all instructions. Time of arrival call will be afternoon prior to procedure after 3:00 pm. [place] at [facility] registration desk, park in [facility]. Need responsible adult  present. NPO at midnight, take sip of water in morning with medications, hold vitamins and over the counter supplements. Take aspirin and plavix morning of procedure with sip of water. Shower with antibacterial soap such as dial prior to procedure. Instructed to call PAT with any questions.

## 2024-03-18 ENCOUNTER — OFFICE VISIT (OUTPATIENT)
Dept: WOUND CARE | Facility: HOSPITAL | Age: 79
End: 2024-03-18
Attending: NURSE PRACTITIONER
Payer: MEDICARE

## 2024-03-18 VITALS
TEMPERATURE: 98 F | SYSTOLIC BLOOD PRESSURE: 136 MMHG | HEART RATE: 81 BPM | RESPIRATION RATE: 17 BRPM | DIASTOLIC BLOOD PRESSURE: 76 MMHG

## 2024-03-18 DIAGNOSIS — I73.9 PAD (PERIPHERAL ARTERY DISEASE) (HCC): ICD-10-CM

## 2024-03-18 DIAGNOSIS — M79.671 BILATERAL FOOT PAIN: ICD-10-CM

## 2024-03-18 DIAGNOSIS — M79.672 BILATERAL FOOT PAIN: ICD-10-CM

## 2024-03-18 DIAGNOSIS — Z89.421 STATUS POST AMPUTATION OF LESSER TOE OF RIGHT FOOT (HCC): ICD-10-CM

## 2024-03-18 DIAGNOSIS — Z89.421 HISTORY OF PARTIAL RAY AMPUTATION OF SECOND TOE OF RIGHT FOOT (HCC): Primary | ICD-10-CM

## 2024-03-18 DIAGNOSIS — Z98.890 POST-OPERATIVE STATE: ICD-10-CM

## 2024-03-18 DIAGNOSIS — N18.6 ESRD (END STAGE RENAL DISEASE) (HCC): ICD-10-CM

## 2024-03-18 DIAGNOSIS — L97.529 ULCER OF TOE, LEFT, WITH UNSPECIFIED SEVERITY (HCC): ICD-10-CM

## 2024-03-18 DIAGNOSIS — S91.204D: ICD-10-CM

## 2024-03-18 PROCEDURE — 99213 OFFICE O/P EST LOW 20 MIN: CPT

## 2024-03-18 NOTE — PROGRESS NOTES
Weekly Wound Education Note    Teaching Provided To: Patient;Family  Training Topics: Dressing;Cleasing and general instructions;Discharge instructions  Training Method: Demonstration;Explain/Verbal  Training Response: Reinforcement needed        Notes: Pt here for follow up wound care visit to left 5th toe, right great toe, and right 3rd toe wounds. All wound measurements unchanged/stable. Edema measurement ankles decreased, calf increased. Provider removed steri strips. All wounds apply betadine, covered with 2x2 gauze and tape or kerlix and tape. Patient to continue wearing surgical shoes to both feet at all times. Pt has upcoming procedure (angiogram) to assess blood flow to LLE. Pt to follow up with provider in 1 week.

## 2024-03-18 NOTE — PROGRESS NOTES
Subjective   Maggie Pelletier is a 78 year old female.    Chief Complaint   Patient presents with    Wound Care     Patient is here for follow up and has no new concerns.        HPI  Maggie Pelletier is a 78 year old female with end-stage renal disease on dialysis, PAD, and CLL who is status post right second metatarsal head resection with excision of right foot ulcerations with primary closure (DOS: 2/15/2024).  Patient continues to do well.  She does have an angiogram this Thursday of the left lower extremity with Dr. Addison.  She continues to experience pain to both of her feet.  Patient has been changing her dressings daily with Betadine and gauze.  She continues to utilize surgical shoes and is in a wheelchair today.  Denies noticing any recent signs of infection.  No other concerns at this time.    Review of Systems  Denies nausea, vomiting, fever, chills, shortness of breath, chest pain, and calf pain.    Objective    Physical Exam:    Derm:  Wound Assessment  Wound 02/19/24 #3 Left Fifth Toe Left (Active)   Wound Image   03/18/24 1445   Drainage Amount Scant 03/18/24 1445   Drainage Description Serous;Yellow 03/18/24 1445   Wound Length (cm) 0.4 cm 03/18/24 1445   Wound Width (cm) 0.3 cm 03/18/24 1445   Wound Surface Area (cm^2) 0.12 cm^2 03/18/24 1445   Wound Depth (cm) 0.1 cm 03/18/24 1445   Wound Volume (cm^3) 0.012 cm^3 03/18/24 1445   Wound Healing % -1100 03/18/24 1445   Margins Well-defined edges 03/18/24 1445   Non-staged Wound Description Full thickness 03/18/24 1445   Mya-wound Assessment Dry;Edema 03/18/24 1445   Wound Granulation Tissue Pale Grey;Pink 02/19/24 1508   Wound Bed Granulation (%) 100 % 02/19/24 1508   Wound Bed Slough (%) 100 % 03/18/24 1445   Wound Odor None 03/18/24 1445   Shape 100% scabbed 03/11/24 1501   Tunneling? No 03/11/24 1501   Undermining? No 03/11/24 1501   Sinus Tracts? No 03/11/24 1501       Wound 02/19/24 #4 Right Great Toe Dorsal;Right (Active)   Wound Image    03/18/24 1444   Drainage Amount Small 03/18/24 1444   Drainage Description Serosanguineous 03/18/24 1444   Wound Length (cm) 3.2 cm 03/18/24 1444   Wound Width (cm) 0.1 cm 03/18/24 1444   Wound Surface Area (cm^2) 0.32 cm^2 03/18/24 1444   Wound Depth (cm) 0.1 cm 03/18/24 1444   Wound Volume (cm^3) 0.032 cm^3 03/18/24 1444   Wound Healing % 36 03/18/24 1444   Margins Well-defined edges 03/18/24 1444   Non-staged Wound Description Full thickness 03/18/24 1444   Mya-wound Assessment Edema;Dry 03/18/24 1444   Wound Granulation Tissue Pink;Firm 03/18/24 1444   Wound Bed Granulation (%) 100 % 03/18/24 1444   Wound Bed Epithelium (%) 95 % 02/26/24 1502   Wound Odor None 03/18/24 1444   Shape steristrips 03/18/24 1444   Tunneling? No 03/11/24 1500   Undermining? No 03/11/24 1500   Sinus Tracts? No 03/11/24 1500       Wound 03/04/24 #6 Right 3rd toe Toe (Comment which one) (Active)   Wound Image   03/18/24 1441   Drainage Amount Scant 03/18/24 1441   Drainage Description Yellow 03/18/24 1441   Wound Length (cm) 0.1 cm 03/18/24 1441   Wound Width (cm) 0.1 cm 03/18/24 1441   Wound Surface Area (cm^2) 0.01 cm^2 03/18/24 1441   Wound Depth (cm) 0.1 cm 03/18/24 1441   Wound Volume (cm^3) 0.001 cm^3 03/18/24 1441   Wound Healing % 99 03/18/24 1441   Margins Well-defined edges 03/18/24 1441   Non-staged Wound Description Full thickness 03/18/24 1441   Mya-wound Assessment Edema;Dry 03/18/24 1441   Wound Granulation Tissue Pink;Pale Mack;Firm 03/18/24 1441   Wound Bed Granulation (%) 100 % 03/18/24 1441   Wound Bed Epithelium (%) 60 % 03/04/24 1558   Wound Bed Slough (%) 10 % 03/04/24 1558   Wound Odor None 03/18/24 1441   Tunneling? No 03/11/24 1459   Undermining? No 03/11/24 1459   Sinus Tracts? No 03/11/24 1459       Integument: Right third digit nailbed remains stable with no current signs of infection.  Dry, stable gangrenous eschar to distal tip of right third digit  Vascular: pedal pulses are nonpalpable bilaterally. CFT  roughly 5 sec to digits, except unable to evaluate CFT to right third digit due to new stable eschar.  Minimal CFT to the left fifth digit with some continued duskiness noted  Musculoskeletal: no acute deformities noted.  Status post right second metatarsal head resection  Neurological: Gross sensation intact via light touch.  Protective sensation diminished via Ipswitch test.    Vital Signs  Vital Signs    03/18/24 1436   BP: 136/76   Pulse: 81   Resp: 17   Temp: 97.8 °F (36.6 °C)   PainSc: 6 - (Moderate)         Allergies  Allergies   Allergen Reactions    Epinephrine OTHER (SEE COMMENTS)     Dental epi. Pain for 2 weeks after dental procedure.       Assessment    Encounter Diagnosis  1. History of partial ray amputation of second toe of right foot ([facility])    2. Ulcer of toe, left, with unspecified severity ([facility])    3. Traumatic loss of toenail of lesser toe of right foot, subsequent encounter    4. PAD (peripheral artery disease) ([facility])    5. ESRD (end stage renal disease) ([facility])    6. Bilateral foot pain    7. Post-operative state    8. Status post amputation of lesser toe of right foot (HCC)        Problem List  Patient Active Problem List   Diagnosis    Gangrene of toe of right foot (HCC)    Ischemic pain of foot, right    PAD (peripheral artery disease) ([facility])    Great toe amputation status, right    Gangrene of right foot (HCC)    ESRD (end stage renal disease) (HCC)    Primary hypertension    ESRD on hemodialysis (HCC)    Carotid stenosis, right    Osteomyelitis of right foot (HCC)    Ulcer of right foot, unspecified ulcer stage (HCC)    CLL (chronic lymphocytic leukemia) ([facility])    Status post amputation of lesser toe of right foot ([facility])    Gangrene of right lower extremity due to atherosclerosis (HCC)    Right foot ulcer, with necrosis of bone ([facility])       Plan  Orders:  No orders of the defined types were placed in this encounter.        -Patient examined, chart history reviewed.    -Patient is status post right  second metatarsal head resection with excision of dorsal and plantar ulcerations of the right foot with primary closure     Examined surgical site today--all Steri-Strips removed today without incident.  There remains an area of opening near the base of the third digit.  Plantar foot incision remains approximated with dry, stable eschar surrounding incision site.  Dorsal portion of the surgical wound appears slightly macerated near the base of the third toe with dry, stable eschar/gangrenous changes.  Any dry/scaly nonviable tissue manually removed from the right foot today.     Examined left foot--left fifth digit eschar does appear stable today.  No current signs of infection.  Some duskiness noted to the fifth digit itself     All wounds cleansed today and covered with Betadine soaked gauze and a dry dressing.  Will have patient change her dressings on a daily basis.    Patient has a left lower extremity angiogram scheduled with Dr. Addison this Thursday.    -Educated on signs of infection and encouraged patient to seek immediate medical attention if noticing any of these signs.    Follow-Up  1 week    Kev Ba DPM    3/18/2024    Dragon speech recognition software was used to prepare this note.  Errors in word recognition may occur.  Please contact me with any questions/concerns with this note.

## 2024-03-19 ENCOUNTER — HOSPITAL ENCOUNTER (OUTPATIENT)
Dept: INTERVENTIONAL RADIOLOGY/VASCULAR | Facility: HOSPITAL | Age: 79
Discharge: HOME | End: 2024-03-19
Attending: SURGERY
Payer: MEDICARE

## 2024-03-20 NOTE — H&P
[facility]    PATIENT'S NAME: JEFFREY FLORES   ATTENDING PHYSICIAN: Oleg Addison M.D.   PATIENT ACCOUNT#:   [id number]    LOCATION:    MEDICAL RECORD #:   [id number]       YOB: 1945  ADMISSION DATE:       03/21/2024    HISTORY AND PHYSICAL EXAMINATION    HISTORY OF PRESENT ILLNESS:  This is a 78-year-old white female who had undergone an extensive right lower leg revascularization with endovascular procedure of the right superficial femoral artery with recurrent stenosis from a previous procedure done by Dr. Edgar Carrel at [facility], as well as extensor popliteal to peroneal trunk and peroneal artery.  At the time of the angiogram, there was no target vessel for bypass surgery.  She improved the flow from what was less than 1 mm peroneal artery, to at least a 2 to 3 mm peroneal artery at the end of the procedure.  The patient had amputation of the right foot.  I got a call just this week from Dr. Ba that she was having some problems with one of the other toes on her foot.  The patient also has a left fifth toe ulceration that just occurred between the last procedure and now.  She had initial foot surgery on the right side on February 23, 2024 with right second metatarsal head resection, excisions of foot ulcers, with primary closure.    The patient is known to me in the past for a history of a right carotid endarterectomy for high-grade stenosis that was symptomatic back on March 17, 2022.  At that time, she had to have a bovine patch, a vein patch, on the artery.  There was an ulcerative lesion.  She is followed by Dr. Keith at [facility] for her dialysis which is on Monday, Wednesday, and Friday.  She has also been seen by Cardiology, Dr. Oleg Grady, who referred her to me from [facility].  She carries the diagnosis of leukemia, followed by Dr. Nichol Riojas from [facility].  Currently in remission with a chronic disease.  She denies any chest pain or  shortness of breath at this time, but there is a past history of an MI.  She has been cleared for procedures by Dr. Grady.  She has had previous coronary bypass surgery done about 6 to 8 years ago at [facility].      MEDICATIONS:  She is on Protonix, carvedilol 2.25 mg b.i.d., allopurinol, aspirin, atorvastatin, Plavix, fenofibrate.    ALLERGIES:  She currently has wrote down in the chart that she has allergies only to epinephrine.    SOCIAL HISTORY:  She denies any EtOH abuse, drug abuse, tobacco abuse.  She quit smoking over 40 years ago.  She is single with 4 children.    REVIEW OF SYSTEMS:  Currently no hematuria, dysuria, hemoptysis, cough, sputum production.  No weight loss, fever, chills.  No hematemesis.  No bright red blood per rectum.      PHYSICAL EXAMINATION:    GENERAL:  He is awake and alert.  He is appropriate, in no acute distress.  LUNGS:  Clear auscultation, without any rales or rhonchi.  HEART:  Normal.  Questionable systolic ejection murmur, left sternal border.  ABDOMEN:  Soft, obese.  No tenderness or masses.  EXTREMITIES:  Femoral pulses are palpable.  Upper extremity pulses present.  She has gangrene of the right foot which was healing.  I have not seen the recent one with worsening of the right third toe, and she has gangrene in the left fourth, fifth toe with the web space.  NEUROLOGIC:  She is moving all 4 extremities.      Patient's angiograms were reviewed from before, as well as ultrasound arterial.  Most of the imaging was done of the right side.  She had a previous procedure, ultrasound, back in February 2024, and it showed that she has bilateral distal popliteal tibial vessel occlusive disease.  The waveforms seem to be triphasic with spectral broadening on the right side prior to procedure.  On the left side, she had good flow in the common femoral artery, the profunda femoral artery, superficial femoral artery, some spectral broadening in the popliteal artery, and the  waveforms going below this were markedly diminished, except for the anterior tibial artery goes into the area of the foot which seemed to be pretty good.  Peak velocity in distal artery was 77 cm/second.    The ultrasound most recently done of the right leg showed marked improvement of flow into the foot, mostly via the peroneal artery.  The posterior tibial artery and the anterior tibial artery were still occluded.  The flow across superficial femoral artery seemed to be better, but there was still some spectral broadening across this area.  Distally, the dorsal pedal artery now had flow in it, but was still dampened at 50 cm/second.  Peroneal artery with much better flow, but there was still spectral broadening.    IMPRESSION:  Patient with end-stage renal disease; coronary artery disease; hypertension; dyslipidemia; with significant distal superficial femoral artery, popliteal artery, tibial vessel occlusive disease.  On her left leg, posterior tibial artery, peroneal artery may be diseased.    Recommended angiogram initially was for the left leg.  We will try to look at the right leg.  If she needs treatment, we will treat the left leg at this time.  I am going to try to re-evaluate the right leg tomorrow, or possibly another day.  Risks and complications including death, myocardial infarction, bleeding, infection, limb loss, graft thrombosis, future limb loss, future graft thrombosis, recurrent stenosis, limb loss with a patent repair were all explained to the patient.  Risks and complication of not doing something was also explained.  Patient understands and agrees.  All questions have been answered.      Dictated By Oleg Addison M.D.  d: 03/20/2024 11:59:51  t: 03/20/2024 12:08:28  Job [id number]  JJW/    cc: Oleg Addison M.D.

## 2024-03-21 ENCOUNTER — ANESTHESIA EVENT (OUTPATIENT)
Dept: CARDIAC SURGERY | Facility: HOSPITAL | Age: 79
End: 2024-03-21
Payer: MEDICARE

## 2024-03-21 ENCOUNTER — HOSPITAL ENCOUNTER (INPATIENT)
Dept: INTERVENTIONAL RADIOLOGY/VASCULAR | Facility: HOSPITAL | Age: 79
LOS: 2 days | Discharge: HOME | End: 2024-03-24
Attending: SURGERY | Admitting: SURGERY
Payer: MEDICARE

## 2024-03-21 DIAGNOSIS — I96 GANGRENE OF TOE OF LEFT FOOT (HCC): ICD-10-CM

## 2024-03-21 DIAGNOSIS — L97.514 RIGHT FOOT ULCER, WITH NECROSIS OF BONE (HCC): Primary | ICD-10-CM

## 2024-03-21 PROBLEM — I25.10 CORONARY ARTERY DISEASE INVOLVING NATIVE HEART WITHOUT ANGINA PECTORIS: Status: ACTIVE | Noted: 2024-01-01

## 2024-03-21 PROBLEM — I25.10 CORONARY ARTERY DISEASE INVOLVING NATIVE HEART WITHOUT ANGINA PECTORIS: Status: ACTIVE | Noted: 2024-03-21

## 2024-03-21 LAB
ANION GAP SERPL CALC-SCNC: 6 MMOL/L (ref 0–18)
ANTIBODY SCREEN: NEGATIVE
BASOPHILS # BLD AUTO: 0.06 X10(3) UL (ref 0–0.2)
BASOPHILS NFR BLD AUTO: 1.1 %
BUN BLD-MCNC: 44 MG/DL (ref 9–23)
CALCIUM BLD-MCNC: 8.4 MG/DL (ref 8.5–10.1)
CHLORIDE SERPL-SCNC: 101 MMOL/L (ref 98–112)
CO2 SERPL-SCNC: 30 MMOL/L (ref 21–32)
CREAT BLD-MCNC: 4.04 MG/DL
EGFRCR SERPLBLD CKD-EPI 2021: 11 ML/MIN/1.73M2 (ref 60–?)
EOSINOPHIL # BLD AUTO: 0.29 X10(3) UL (ref 0–0.7)
EOSINOPHIL NFR BLD AUTO: 5.2 %
ERYTHROCYTE [DISTWIDTH] IN BLOOD BY AUTOMATED COUNT: 16.1 %
GLUCOSE BLD-MCNC: 99 MG/DL (ref 70–99)
HCT VFR BLD AUTO: 30.6 %
HGB BLD-MCNC: 11.3 G/DL
IMM GRANULOCYTES # BLD AUTO: 0.02 X10(3) UL (ref 0–1)
IMM GRANULOCYTES NFR BLD: 0.4 %
LYMPHOCYTES # BLD AUTO: 1.28 X10(3) UL (ref 1–4)
LYMPHOCYTES NFR BLD AUTO: 23 %
MCH RBC QN AUTO: 38.2 PG (ref 26–34)
MCHC RBC AUTO-ENTMCNC: 36.9 G/DL (ref 31–37)
MCV RBC AUTO: 103.4 FL
MONOCYTES # BLD AUTO: 1.18 X10(3) UL (ref 0.1–1)
MONOCYTES NFR BLD AUTO: 21.2 %
NEUTROPHILS # BLD AUTO: 2.74 X10 (3) UL (ref 1.5–7.7)
NEUTROPHILS # BLD AUTO: 2.74 X10(3) UL (ref 1.5–7.7)
NEUTROPHILS NFR BLD AUTO: 49.1 %
OSMOLALITY SERPL CALC.SUM OF ELEC: 295 MOSM/KG (ref 275–295)
PLATELET # BLD AUTO: 138 10(3)UL (ref 150–450)
POTASSIUM SERPL-SCNC: 3.5 MMOL/L (ref 3.5–5.1)
RBC # BLD AUTO: 2.96 X10(6)UL
RH BLOOD TYPE: POSITIVE
SODIUM SERPL-SCNC: 137 MMOL/L (ref 136–145)
WBC # BLD AUTO: 5.6 X10(3) UL (ref 4–11)

## 2024-03-21 PROCEDURE — 99223 1ST HOSP IP/OBS HIGH 75: CPT | Performed by: INTERNAL MEDICINE

## 2024-03-21 PROCEDURE — B410YZZ FLUOROSCOPY OF ABDOMINAL AORTA USING OTHER CONTRAST: ICD-10-PCS | Performed by: SURGERY

## 2024-03-21 PROCEDURE — B41GYZZ FLUOROSCOPY OF LEFT LOWER EXTREMITY ARTERIES USING OTHER CONTRAST: ICD-10-PCS | Performed by: SURGERY

## 2024-03-21 PROCEDURE — 99222 1ST HOSP IP/OBS MODERATE 55: CPT | Performed by: INTERNAL MEDICINE

## 2024-03-21 RX ORDER — LIDOCAINE AND PRILOCAINE 25; 25 MG/G; MG/G
CREAM TOPICAL AS NEEDED
Status: DISCONTINUED | OUTPATIENT
Start: 2024-03-22 | End: 2024-03-24

## 2024-03-21 RX ORDER — HYDROCODONE BITARTRATE AND ACETAMINOPHEN 5; 325 MG/1; MG/1
1 TABLET ORAL EVERY 4 HOURS PRN
Status: DISCONTINUED | OUTPATIENT
Start: 2024-03-21 | End: 2024-03-22 | Stop reason: HOSPADM

## 2024-03-21 RX ORDER — CLOPIDOGREL BISULFATE 75 MG/1
75 TABLET ORAL DAILY
Status: DISCONTINUED | OUTPATIENT
Start: 2024-03-22 | End: 2024-03-22 | Stop reason: HOSPADM

## 2024-03-21 RX ORDER — HYDROCODONE BITARTRATE AND ACETAMINOPHEN 5; 325 MG/1; MG/1
2 TABLET ORAL EVERY 4 HOURS PRN
Status: DISCONTINUED | OUTPATIENT
Start: 2024-03-21 | End: 2024-03-22 | Stop reason: HOSPADM

## 2024-03-21 RX ORDER — CEFAZOLIN SODIUM/WATER 2 G/20 ML
2 SYRINGE (ML) INTRAVENOUS ONCE
Status: COMPLETED | OUTPATIENT
Start: 2024-03-21 | End: 2024-03-21

## 2024-03-21 RX ORDER — ALBUMIN (HUMAN) 12.5 G/50ML
25 SOLUTION INTRAVENOUS
Status: DISCONTINUED | OUTPATIENT
Start: 2024-03-21 | End: 2024-03-22

## 2024-03-21 RX ORDER — ALLOPURINOL 300 MG/1
300 TABLET ORAL DAILY
Status: DISCONTINUED | OUTPATIENT
Start: 2024-03-21 | End: 2024-03-24

## 2024-03-21 RX ORDER — HEPARIN SODIUM 5000 [USP'U]/ML
INJECTION, SOLUTION INTRAVENOUS; SUBCUTANEOUS
Status: COMPLETED
Start: 2024-03-21 | End: 2024-03-21

## 2024-03-21 RX ORDER — FENOFIBRATE 134 MG/1
134 CAPSULE ORAL
Status: DISCONTINUED | OUTPATIENT
Start: 2024-03-22 | End: 2024-03-24

## 2024-03-21 RX ORDER — CARVEDILOL 3.12 MG/1
3.12 TABLET ORAL 2 TIMES DAILY WITH MEALS
Status: DISCONTINUED | OUTPATIENT
Start: 2024-03-21 | End: 2024-03-22

## 2024-03-21 RX ORDER — MIDAZOLAM HYDROCHLORIDE 1 MG/ML
INJECTION INTRAMUSCULAR; INTRAVENOUS
Status: COMPLETED
Start: 2024-03-21 | End: 2024-03-21

## 2024-03-21 RX ORDER — SODIUM CHLORIDE 9 MG/ML
INJECTION, SOLUTION INTRAVENOUS CONTINUOUS
Status: DISCONTINUED | OUTPATIENT
Start: 2024-03-21 | End: 2024-03-24

## 2024-03-21 RX ORDER — IODIXANOL 320 MG/ML
100 INJECTION, SOLUTION INTRAVASCULAR
Status: COMPLETED | OUTPATIENT
Start: 2024-03-21 | End: 2024-03-21

## 2024-03-21 RX ORDER — ACETAMINOPHEN 325 MG/1
650 TABLET ORAL EVERY 4 HOURS PRN
Status: DISCONTINUED | OUTPATIENT
Start: 2024-03-21 | End: 2024-03-22 | Stop reason: HOSPADM

## 2024-03-21 RX ORDER — ASPIRIN 81 MG/1
81 TABLET ORAL DAILY
Status: DISCONTINUED | OUTPATIENT
Start: 2024-03-21 | End: 2024-03-23

## 2024-03-21 RX ORDER — ATORVASTATIN CALCIUM 10 MG/1
10 TABLET, FILM COATED ORAL NIGHTLY
Status: DISCONTINUED | OUTPATIENT
Start: 2024-03-21 | End: 2024-03-22

## 2024-03-21 RX ORDER — LIDOCAINE HYDROCHLORIDE 10 MG/ML
INJECTION, SOLUTION EPIDURAL; INFILTRATION; INTRACAUDAL; PERINEURAL
Status: COMPLETED
Start: 2024-03-21 | End: 2024-03-21

## 2024-03-21 RX ADMIN — CEFAZOLIN SODIUM/WATER 2 G: 2 G/20 ML SYRINGE (ML) INTRAVENOUS at 12:37:00

## 2024-03-21 RX ADMIN — IODIXANOL 80 ML: 320 INJECTION, SOLUTION INTRAVASCULAR at 08:32:00

## 2024-03-21 RX ADMIN — ATORVASTATIN CALCIUM 10 MG: 10 TABLET, FILM COATED ORAL at 20:09:00

## 2024-03-21 NOTE — CONSULTS
[facility]    PATIENT'S NAME: JEFFREY FLORES   ATTENDING PHYSICIAN: Oleg Addison M.D.   CONSULTING PHYSICIAN: Oleg Addison M.D.   PATIENT ACCOUNT#:   [id number]    LOCATION:  [address] [place] [address]  MEDICAL RECORD #:   [id number]       YOB: 1945  ADMISSION DATE:       03/21/2024      CONSULT DATE:  03/21/2024    REPORT OF CONSULTATION    PROGRESS NOTE     Films were reviewed with Dr. Valenzuela of the left leg angiogram and discussed also with Dr. Grady, her primary cardiologist from [facility].  The patient also was explained the procedure and discussed with her daughter.  The artery is open from the superficial femoral artery, popliteal artery, and anterior tibial artery going down to the foot, with incomplete plantar arch flow going from the peroneal artery into the area of the foot, poor flow in the plantar aspect.  Her peroneal artery and posterior tibial artery are completely occluded.  There does not appear to be over 50% stenosis, but there is some tapering of the artery as it goes down.  She does have vein available in the left leg.  She has a small ulceration at this time, the flow seems that it may be adequate for healing.  I am reluctant to do angioplasty of the entire leg or to do a bypass at this time.  The patient has no cardiac or cerebrovascular symptoms at this time.  She has had a previous carotid endarterectomy done by myself in the past and she has done fine with that since then.  She has been followed by Dr. Grady for her cardiac status.  She also has a diagnosis in the past of leukemia that is currently in remission, being followed by [facility].  She has a past history of an MI with previous coronary artery bypass surgery done about 6 to 8 years ago at [facility].  The patient will have Dr. Hernandez and the nephrology group from [facility] follow her for dialysis which is on Monday, Wednesday, Friday here, and will have [facility] with Dr. Valenzuela evaluate her  cardiac status before intervention.  Based upon our findings, tomorrow we will make a decision whether this is an endovascular procedure or bypass surgery.  The last procedure done on February 13, 2024, went well; however, at that time there was no target vessel for any bypass, so it was not going to be a possible resolution to her problem.  We will reevaluate the situation tomorrow, hold off on any further revascularization of the left leg, and then see how she does over the next 2 to 4 weeks.  Dr. Colten Ba was notified about her findings.  All questions answered for the patient.  She is aware she could still lose her leg, but she is also aware of the risks and complications with any type of intervention and at this point, since flow is going directly into the foot, would still try to watch it conservatively.  All questions answered.    Dictated By Oleg Addison M.D.  d: 03/21/2024 09:42:45  t: 03/21/2024 10:45:58  [facility] [id number]  JJW/    cc: Oleg Addison M.D.

## 2024-03-21 NOTE — CONSULTS
Cardiology Consultation      Maggie Pelletier Patient Status:  Inpatient    1945 MRN [id number]   Location [facility] 2NE-A Attending Oleg Addison MD   Hosp Day # 0 PCP [facility]     Reason for Consultation:  Preprocedure eval and PAD eval/tx    History of Present Illness:  Maggie Pelletier is a(n) 78 year old female with chronic medical conditions including severe pvd s/p numerous interventions, CAD w/ hx of CABG, hx of CEA, s/p TAVR , moderate to severe MR, ESRD on HD, CLL who presents with complaints of new sores/ulcers to bilateral feet. She has had extensive PAD workup and intervention to her right lower extremity last month and underwent foot surgery thereafter. Since then, there have been development of ulcers to her left foot and a new ulcer to her right. She was then referred to vascular surgery who planned for initial diagnostic angio to eval. Cardiology was consulted for preprocedure eval and interventional colleague will assist with procedure planned for tomorrow. Patient herself is comfortable while lying in the bed but notes she has not been able to do stand very much due to pain in her extremities along with dressings that are present on the right foot. She denies chest pain, shortness of breath, palpitations, swelling, syncope otherwise.     History:  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 (HCC)     Coronary atherosclerosis     Dialysis patient (HCC)     High cholesterol     History of blood transfusion     Leukemia (HCC)     Renal disorder     Right foot ulcer, with fat layer exposed (HCC)      Past Surgical History:   Procedure Laterality Date    APPENDECTOMY      AV FISTULA OR GRAFT ARTERIAL Left     CABG      CATH PERCUTANEOUS  TRANSLUMINAL CORONARY ANGIOPLASTY      CHOLECYSTECTOMY      OTHER Right     big toe amputation     Family History   Problem Relation Age of Onset    Heart Disorder Father     Cancer  Mother       reports that she has quit smoking. She has never used smokeless tobacco. She reports that she does not currently use alcohol. She reports that she does not use drugs.    Allergies:  Allergies   Allergen Reactions    Epinephrine OTHER (SEE COMMENTS)     Dental epi. Pain for 2 weeks after dental procedure.       Medications:    Current Facility-Administered Medications:     sodium chloride 0.9% infusion, , Intravenous, Continuous    acetaminophen (Tylenol) tab 650 mg, 650 mg, Oral, Q4H PRN **OR** HYDROcodone-acetaminophen (Norco) 5-325 MG per tab 1 tablet, 1 tablet, Oral, Q4H PRN **OR** HYDROcodone-acetaminophen (Norco) 5-325 MG per tab 2 tablet, 2 tablet, Oral, Q4H PRN    [START ON 3/22/2024] clopidogrel (Plavix) tab 75 mg, 75 mg, Oral, Daily    ceFAZolin (Ancef) 2 g in 20mL IV syringe premix, 2 g, Intravenous, Once    Review of Systems:  A comprehensive review of systems was negative if not otherwise mention in above HPI.    /45 (BP Location: Right arm)   Pulse 76   Temp 97.8 °F (36.6 °C) (Temporal)   Resp 13   Wt 141 lb 9.6 oz (64.2 kg)   SpO2 100%   BMI 27.65 kg/m²   Temp (24hrs), Av.8 °F (36.6 °C), Min:97.8 °F (36.6 °C), Max:97.8 °F (36.6 °C)     No intake or output data in the 24 hours ending 24 0942  Wt Readings from Last 3 Encounters:   24 141 lb 9.6 oz (64.2 kg)   24 145 lb (65.8 kg)   02/15/24 139 lb 12.4 oz (63.4 kg)       Physical Exam:   General: Alert and oriented x 3. No apparent distress. No respiratory or constitutional distress.  HEENT: Normocephalic, anicteric sclera, neck supple.  Neck: No JVD  Cardiac: Irregularly irregular. No murmur, pericardial rub, S3.  Lungs: Clear without wheezes, rales, rhonchi or dullness.  Normal excursions and effort.  Abdomen: Soft, non-tender. BS-present.  Extremities: no swelling. Right foot has wound dressing in place. Ulceration noted to left foot fifth digit.   Neurologic: Alert and oriented, normal affect.  Skin: Warm  and dry.     Laboratory Data:         Assessment:  Severe PVD - w/ new left fifth toe ulceration   S/p 2/24 right second metatarsal head resection and excision of right foot ulcerations with primary closure  2/13/24 angioplasty of the right peroneal artery, tibioperoneal trunk, R FSA per Dr. Addison   CAD, remote CABG  On ASA, plavix, BB, statin   Hx of CEA  TAVR 2022  Preserved LVEF  Mean systolic gradient 13 mmhg  Moderate to severe MR, PASP 55 mmHg  ESRD on HD  CLL  Chronic thrombocytopenia      Plan:  Discussed case with Dr Addison who plans for peripheral angiogram tomorrow in conjunction with Dr Valenzuela to see if intervention can be performed to left leg given new ulceration that was noted. Re-eval of the right lower extremity may also be completed given prior hx.   Obtain EKG, CMP, CBC   Patient is currently free of cardiopulmonary complaints at this time. Patient is at elevated but acceptable risk for planned procedure given underlying co morbidities. Risks and benefits of procedure as explained by performing physicians.   Will cont to follow.         Thank you for allowing me to participate in the care of your patient.      Benson Mi DO  Cardiologist  [facility]  3/21/2024 9:42 AM      Note to the patient: The 21st Century Cures Act makes medical notes like these available to patients in the interest of transparency. However, be advised that this is a medical document. It is intended as peer to peer communication. It is written in medical language and may contain abbreviations or verbiage that are unfamiliar. It may appear blunt or direct. Medical documents are intended to carry relevant information, facts as evident, and clinical opinion of the practitioner.     Disclaimer: Components of this note were documented using voice recognition system and are subject to errors not corrected at proofreading. Contact the author of this note for any clarifications.

## 2024-03-21 NOTE — ANESTHESIA PREPROCEDURE EVALUATION
PRE-OP EVALUATION    Patient Name: Maggie Pelletier    Admit Diagnosis: Gangrene of toe of left foot (HCC) [I96]    Pre-op Diagnosis: gangrene    RIGHT LEG ANGIOGRAM, CUTDOWN RIGHT SFA    Anesthesia Procedure: RIGHT LEG ANGIOGRAM, CUTDOWN RIGHT SFA (Right)  ANGIOGRAM ADD-ON (Right)    Surgeon(s) and Role:     * Oleg Addison MD - Primary    Pre-op vitals reviewed.  Temp: 98 °F (36.7 °C)  Pulse: 90  Resp: 16  BP: 115/92  SpO2: 100 %  Body mass index is 27.65 kg/m².    Current medications reviewed.  Hospital Medications:   [COMPLETED] lidocaine PF (Xylocaine-MPF) 1 % injection        [COMPLETED] heparin (Porcine) 5000 UNIT/ML injection        [COMPLETED] iodixanol (VISIPAQUE) 320 MG/ML injection 100 mL  100 mL Injection ONCE PRN    sodium chloride 0.9% infusion   Intravenous Continuous    [COMPLETED] midazolam (Versed) 2 MG/2ML injection        [COMPLETED] fentaNYL (Sublimaze) 50 mcg/mL injection        acetaminophen (Tylenol) tab 650 mg  650 mg Oral Q4H PRN    Or    HYDROcodone-acetaminophen (Norco) 5-325 MG per tab 1 tablet  1 tablet Oral Q4H PRN    Or    HYDROcodone-acetaminophen (Norco) 5-325 MG per tab 2 tablet  2 tablet Oral Q4H PRN    [START ON 3/22/2024] clopidogrel (Plavix) tab 75 mg  75 mg Oral Daily    [COMPLETED] ceFAZolin (Ancef) 2 g in 20mL IV syringe premix  2 g Intravenous Once    [START ON 3/22/2024] sodium chloride 0.9 % IV bolus 100 mL  100 mL Intravenous Q30 Min PRN    And    albumin human (Albumin) 25% injection 25 g  25 g Intravenous PRN Dialysis    [START ON 3/22/2024] lidocaine-prilocaine (Emla) 2.5-2.5 % cream   Topical PRN    [START ON 3/22/2024] epoetin rigoberto (Epogen, Procrit) 82797 UNIT/ML injection 10,000 Units  10,000 Units Intravenous Once in dialysis    allopurinol (Zyloprim) tab 300 mg  300 mg Oral Daily    atorvastatin (Lipitor) tab 10 mg  10 mg Oral Nightly    carvedilol (Coreg) tab 3.125 mg  3.125 mg Oral BID with meals    [START ON 3/22/2024] fenofibrate micronized (Lofibra) cap  134 mg  134 mg Oral Daily with breakfast    aspirin DR tab 81 mg  81 mg Oral Daily       Outpatient Medications:     Medications Prior to Admission   Medication Sig Dispense Refill Last Dose    HYDROcodone-acetaminophen 5-325 MG Oral Tab Take 1 tablet by mouth every 6 (six) hours as needed. 12 tablet 0 Past Week    loperamide 1 MG/7.5ML Oral Solution Take 15 mL (2 mg total) by mouth 3 (three) times daily as needed for Diarrhea.   Past Week    gentamicin 0.1 % External Ointment Apply 1 Application topically 2 (two) times daily. 30 g 0 3/20/2024 at 2100    acetaminophen 325 MG Oral Tab Take 1 tablet (325 mg total) by mouth every 6 (six) hours as needed for Pain.   Past Week    allopurinol 300 MG Oral Tab Take 1 tablet (300 mg total) by mouth daily.   3/20/2024 at 0800    aspirin 81 MG Oral Tab EC Take 1 tablet (81 mg total) by mouth daily.   3/20/2024 at 0800    atorvastatin 10 MG Oral Tab Take 1 tablet (10 mg total) by mouth nightly.   3/20/2024 at 2100    calcium acetate 667 MG Oral Cap Take 1 capsule (667 mg total) by mouth 3 (three) times daily with meals.   3/20/2024 at 1800    cinacalcet 30 MG Oral Tab Take 1 tablet (30 mg total) by mouth daily with breakfast.   3/20/2024 at 0800    clopidogrel 75 MG Oral Tab Take 1 tablet (75 mg total) by mouth daily.   3/20/2024 at 0800    Fenofibrate 160 MG Oral Tab Take 1 tablet (160 mg total) by mouth daily.   3/20/2024 at 0800    imatinib 100 MG Oral Tab Take 3 tablets (300 mg total) by mouth daily.   3/20/2024 at 1800    Magnesium 500 MG Oral Tab Take 1 tablet (500 mg total) by mouth daily.   3/20/2024 at 2100    cyanocobalamine 1000 MCG Oral Tab Take 1 tablet (1,000 mcg total) by mouth daily.   3/20/2024 at 0800    carvedilol 6.25 MG Oral Tab Take 0.5 tablets (3.125 mg total) by mouth 2 (two) times daily with meals. Per patient, nephrologist decreased to once daily.   3/20/2024 at 1800       Allergies: Epinephrine      Anesthesia Evaluation    Patient summary  reviewed.    Anesthetic Complications  (-) history of anesthetic complications         GI/Hepatic/Renal             (+) chronic renal disease (HD on MWF) and ESRD and hemodialysis                   Cardiovascular  Comment: Carotid stenosis      Exercise tolerance: good     MET: >4      (+) hypertension     (+) CAD        (+) valvular problems/murmurs (s/p TAVR)                        Endo/Other    Negative endo/other ROS.                              Pulmonary    Negative pulmonary ROS.                       Neuro/Psych    Negative neuro/psych ROS.                          Peripheral vascular disease        Past Surgical History:   Procedure Laterality Date    APPENDECTOMY      AV FISTULA OR GRAFT ARTERIAL Left     CABG      CATH PERCUTANEOUS  TRANSLUMINAL CORONARY ANGIOPLASTY      CHOLECYSTECTOMY      OTHER Right     big toe amputation     Social History     Socioeconomic History    Marital status:    Tobacco Use    Smoking status: Former    Smokeless tobacco: Never   Vaping Use    Vaping Use: Never used   Substance and Sexual Activity    Alcohol use: Not Currently     Comment: 1 drink yearly    Drug use: No     History   Drug Use No     Available pre-op labs reviewed.  Lab Results   Component Value Date    WBC 5.6 03/21/2024    RBC 2.96 (L) 03/21/2024    HGB 11.3 (L) 03/21/2024    HCT 30.6 (L) 03/21/2024    .4 (H) 03/21/2024    MCH 38.2 (H) 03/21/2024    MCHC 36.9 03/21/2024    RDW 16.1 03/21/2024    .0 (L) 03/21/2024     Lab Results   Component Value Date     03/21/2024    K 3.5 03/21/2024     03/21/2024    CO2 30.0 03/21/2024    BUN 44 (H) 03/21/2024    CREATSERUM 4.04 (H) 03/21/2024    GLU 99 03/21/2024    CA 8.4 (L) 03/21/2024            Airway      Mallampati: III  Mouth opening: >3 FB  TM distance: > 6 cm  Neck ROM: full Cardiovascular    Cardiovascular exam normal.  Rhythm: regular  Rate: normal     Dental    Dentition appears grossly intact         Pulmonary    Pulmonary  exam normal.  Breath sounds clear to auscultation bilaterally.               Other findings              ASA: 3   Plan: general  NPO status verified and patient meets guidelines.  Patient has not taken beta blockers in last 24 hours.  Post-procedure pain management plan discussed with surgeon and patient.    Comment: I spoke with the patient and discussed the risks of general anesthesia, which include nausea and vomiting; sore throat; injury to the lips, gums, teeth, and eyes; cardiac, pulmonary, and neurologic events; aspiration; and allergic reactions. The patient understands these risks and consents to receiving general anesthesia for this procedure.  Plan/risks discussed with: patient  Use of blood product(s) discussed with: patient    Consented to blood products.          Present on Admission:   Gangrene of toe of left foot (HCC)

## 2024-03-21 NOTE — CONSULTS
[place] [facility]  CONSULT     Maggie Pelletier Patient Status:  Inpatient    1945 MRN [id number]   Location [facility] 2NE-A Attending Oleg Addison MD   Hosp Day # 0 PCP [facility]     Reason for consult: med management    Requested by: Dr. Addison    Subjective:   History of Present Illness:     Maggie Pelletier is a 78 year old female with PMhx of PAD, CAD, ESRD, DL, Luekemia, s/p TAVR, gangrene of L 4tha nd 5th webspace presents for peripheral angio for her PAD. Pt underwent angiogram this morning of her L lower extremity without intervention. Plan for R LE angiogram tmrw. Pt tolerated procedure well. No CP or SOB. No N/V/D/C or abd apin. No F/C.     History/Other:    Past Medical History:  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 (HCC)     Coronary atherosclerosis     Dialysis patient (HCC)     High cholesterol     History of blood transfusion     Leukemia (HCC)     Renal disorder     Right foot ulcer, with fat layer exposed (HCC)      Past Surgical History:   Past Surgical History:   Procedure Laterality Date    APPENDECTOMY      AV FISTULA OR GRAFT ARTERIAL Left     CABG      CATH PERCUTANEOUS  TRANSLUMINAL CORONARY ANGIOPLASTY      CHOLECYSTECTOMY      OTHER Right     big toe amputation      Family History:   Family History   Problem Relation Age of Onset    Heart Disorder Father     Cancer Mother      Social History:    reports that she has quit smoking. She has never used smokeless tobacco. She reports that she does not currently use alcohol. She reports that she does not use drugs.     Allergies:   Allergies   Allergen Reactions    Epinephrine OTHER (SEE COMMENTS)     Dental epi. Pain for 2 weeks after dental procedure.       Medications:    Current Facility-Administered Medications on File Prior to Encounter   Medication Dose Route Frequency Provider Last Rate Last Admin    [COMPLETED] midodrine (ProAmatine) tab 10 mg  10  mg Oral Once in dialysis Colten Ba DPM   10 mg at 02/16/24 1218    [COMPLETED] epoetin rigoberto (Epogen, Procrit) 39034 UNIT/ML injection 10,000 Units  10,000 Units Intravenous Once in dialysis Colten Ba DPM   10,000 Units at 02/16/24 1300    [COMPLETED] lidocaine-prilocaine (Emla) 2.5-2.5 % cream   Topical Once in dialysis Colten Ba DPM   Given at 02/16/24 1208    [COMPLETED] midodrine (ProAmatine) tab 10 mg  10 mg Oral Once in dialysis Steve Howell MD   10 mg at 02/14/24 0915    [COMPLETED] lidocaine-prilocaine (Emla) 2.5-2.5 % cream   Topical Once in dialysis Steve Howell MD   Given at 02/14/24 0929    [COMPLETED] epoetin rigoberto (Epogen, Procrit) 25203 UNIT/ML injection 10,000 Units  10,000 Units Intravenous Once in dialysis Steve Howell MD   10,000 Units at 02/14/24 0929    [COMPLETED] iodixanol (VISIPAQUE) 320 MG/ML injection 150 mL  150 mL Injection ONCE PRN Oleg Addison MD   160 mL at 02/13/24 0955    [COMPLETED] lidocaine PF (Xylocaine-MPF) 1 % injection             [COMPLETED] heparin (Porcine) 5000 UNIT/ML injection             [COMPLETED] midazolam (Versed) 2 MG/2ML injection             [COMPLETED] fentaNYL (Sublimaze) 50 mcg/mL injection             [COMPLETED] heparin (Porcine) 5000 UNIT/ML injection             [COMPLETED] midazolam (Versed) 2 MG/2ML injection             [COMPLETED] fentaNYL (Sublimaze) 50 mcg/mL injection             [COMPLETED] clopidogrel (Plavix) 75 MG tab              Current Outpatient Medications on File Prior to Encounter   Medication Sig Dispense Refill    HYDROcodone-acetaminophen 5-325 MG Oral Tab Take 1 tablet by mouth every 6 (six) hours as needed. 12 tablet 0    loperamide 1 MG/7.5ML Oral Solution Take 15 mL (2 mg total) by mouth 3 (three) times daily as needed for Diarrhea.      gentamicin 0.1 % External Ointment Apply 1 Application topically 2 (two) times daily. 30 g 0    acetaminophen 325 MG Oral Tab Take 1 tablet (325 mg total) by mouth  every 6 (six) hours as needed for Pain.      allopurinol 300 MG Oral Tab Take 1 tablet (300 mg total) by mouth daily.      aspirin 81 MG Oral Tab EC Take 1 tablet (81 mg total) by mouth daily.      atorvastatin 10 MG Oral Tab Take 1 tablet (10 mg total) by mouth nightly.      calcium acetate 667 MG Oral Cap Take 1 capsule (667 mg total) by mouth 3 (three) times daily with meals.      cinacalcet 30 MG Oral Tab Take 1 tablet (30 mg total) by mouth daily with breakfast.      clopidogrel 75 MG Oral Tab Take 1 tablet (75 mg total) by mouth daily.      Fenofibrate 160 MG Oral Tab Take 1 tablet (160 mg total) by mouth daily.      imatinib 100 MG Oral Tab Take 3 tablets (300 mg total) by mouth daily.      Magnesium 500 MG Oral Tab Take 1 tablet (500 mg total) by mouth daily.      cyanocobalamine 1000 MCG Oral Tab Take 1 tablet (1,000 mcg total) by mouth daily.      carvedilol 6.25 MG Oral Tab Take 0.5 tablets (3.125 mg total) by mouth 2 (two) times daily with meals. Per patient, nephrologist decreased to once daily.         Review of Systems:   A comprehensive review of systems was completed.    Pertinent positives and negatives noted in the HPI.    Objective:   Physical Exam:    BP (!) 115/92   Pulse 90   Temp 98 °F (36.7 °C) (Oral)   Resp 16   Wt 141 lb 9.6 oz (64.2 kg)   SpO2 100%   BMI 27.65 kg/m²   General: No acute distress, Alert  Respiratory: No rhonchi, no wheezes  Cardiovascular: irreg irreg  Abdomen: Soft, NT/ND, +BS  Neuro: No new focal deficits  Extremities: R foot dressing      Results:    Labs:      Labs Last 24 Hours:  Recent Labs   Lab 03/21/24  1126   WBC 5.6   HGB 11.3*   .4*   .0*       Recent Labs   Lab 03/21/24  1126   GLU 99   BUN 44*   CREATSERUM 4.04*   CA 8.4*      K 3.5      CO2 30.0       No results for input(s): \"PTP\", \"INR\" in the last 168 hours.    No results for input(s): \"TROP\", \"CK\" in the last 168 hours.      Imaging: Imaging data reviewed in  Epic.    Assessment & Plan:      #Severe PAD with ulceration  S/p angiogram 3/21/2024, plan for repeat tmrw  Vascular surgery/Cardiology/podiatry following  Pain control    #ESRD  Renal following  Continue HD    #CAD s/p cABG  #S/p TAVR  Continue cardiac meds    #CLL  Monitor CBC        Plan of care discussed with patient, Rn    Baltazar Ortiz MD  3/21/2024    The 21st Century Cures Act makes medical notes like these available to patients in the interest of transparency. Please be advised this is a medical document. Medical documents are intended to carry relevant information, facts as evident, and the clinical opinion of the practitioner. The medical note is intended as peer to peer communication and may appear blunt or direct. It is written in medical language and may contain abbreviations or verbiage that are unfamiliar.

## 2024-03-21 NOTE — PROCEDURES
----- Message from Sherron Santos sent at 2/14/2018  9:43 AM CST -----  Regarding: Pt Question-Can I have surgery same day as 1st appt? Dr Medrano  Contact: [phone]  The pt's mom Lynne wants to know if the glaucoma surgery can be done on the same day as the initial appointment? Lynne is having the referral letter and records faxed from [facility].  Please call Lynne after recs have been rcvd at [phone] to discuss.    Thanks - Sherron    Please DO NOT send this message and/or reply back to sender.  Call Center Representatives DO NOT respond to messages.       [facility]    PATIENT'S NAME: JEFFREY FLROES   ATTENDING PHYSICIAN: Oleg Addison M.D.   OPERATING PHYSICIAN: Oleg Addison M.D.   PATIENT ACCOUNT#:   [id number]    LOCATION:  [address] [place] [address]  MEDICAL RECORD #:   [id number]       YOB: 1945  ADMISSION DATE:       03/21/2024      OPERATION DATE:  03/21/2024    CARDIAC PROCEDURE TRANSCRIPTION    PERIPHERAL ANGIOGRAPHY/PERCUTANEOUS PERIPHERAL INTERVENTION    PREOPERATIVE DIAGNOSIS:  Gangrene of left fourth/fifth webspace; end-stage renal disease; history of also gangrene of the right foot, with recent right lower leg angioplasty.  POSTOPERATIVE DIAGNOSIS:  Gangrene of left fourth/fifth webspace; end-stage renal disease; history of also gangrene of the right foot, with recent right lower leg angioplasty, with a patent left aortoiliofemoral segment, left superficial femoral artery and popliteal artery segment, and a patent left anterior tibial artery with an incomplete plantar arch and proximal disease of the dorsal pedal artery, with complete occlusion of the posterior tibial artery, peroneal artery, and tibioperoneal trunk.  PROCEDURE PERFORMED:    1.   Duplex ultrasound access of right common femoral artery and fluoroscopy and ultrasound imaging with placement of a 4-English sheath.  2.   Retrograde angiogram of the right common femoral artery and placement of the sheath in the femoral artery.  3.   Pigtail catheter in suprarenal aorta with aortic angiogram showing normal aortic, bilateral iliac, and femoral arteries.  4.   End-hole catheter angiogram of the left common femoral artery showing normal flow through the common femoral artery, superficial femoral artery, popliteal artery, and anterior tibial artery.  5.   Occlusion of the posterior tibial artery and peroneal artery after some proximal reconstituted flow seen, with some disease of the proximal dorsal pedal artery, distal anterior tibial artery.  6.   Manual pressure of the right  femoral artery.      INDICATIONS:  This is a 78-year-old white female with end-stage renal disease with gangrene of the left foot.  She had gangrene of the right foot which [facility] which had re-narrowed down and underwent right lower leg superficial femoral artery, popliteal artery, peroneal artery angioplasty as the only runoff vessel for her foot and then she had a toe amputation, which was healing until just recently when she developed some tip gangrene of the third toe.  The patient is being followed by Colten Ba DPM.  The left fourth/fifth toe ulceration just occurred spontaneously about a month ago.  It has not worsened.  Ultrasound suggested the anterior tibial artery was open.  Flow may be adequate; however, he recommended an angiogram to see if there could be improvement.    Risks and complications including death, cardiac complications, bleeding, infection, arterial injury, distal embolization, recurrent stenosis, bleeding, and infection were explained to the patient and her daughter.  They understood and agreed.  All questions were answered.  They are aware that I will be staging the procedure, doing the left leg today and will schedule for the right leg reevaluation tomorrow, even though the ultrasound suggests the peroneal artery is still open.  All questions answered.  The option of no treatment was also explained to the patient, and primary amputation was also explained.    DESCRIPTION OF PROCEDURE:  The patient was placed supine on the procedure table in the catheterization lab, underwent IV conscious sedation with continuous monitoring of blood pressure, heart rate, pulse oximetry by RN under my supervision.  Procedure started at 0736 and ended at 0830.  The right groin and left groin were prepped and draped in the usual sterile technique.  After time-out was done, the patient would receive 2 mg of Versed and 50 mcg of fentanyl,  then had access of the right femoral artery with a micropuncture technique after about 15 mL of 1% Xylocaine for local anesthesia.  There was some plaque disease in the area of the femoral artery; however, it seemed to go in without difficulty.  A retrograde angiogram showed that this was in the mid to proximal common femoral artery.    The patient had a relatively high bifurcation of the aorta and had to have a pigtail catheter placed in the suprarenal aorta with aortic angiogram performed.  There was severe calcification of all the vessels imaged, even without contrast, of the mesenteric, splenic, renal arteries, aorta.    The patient then had an exchange catheter placed at the origin of the common iliac artery on the left side and a Glidewire placed into the area of the profunda femoral artery.  A [place] catheter was placed over this and brought down to the femoral artery origin.    Angiograms were done from here, demonstrating the patency of the common femoral artery.  There was some mild disease of the superficial femoral artery, but it seemed to be all less than 50% blockage.  The patient's popliteal artery was also open as the artery kind of tapered down, but no significant stenosis.  The tibioperoneal trunk was occluded.  There was reconstitution somewhat of a peroneal artery, although it ended abruptly.  The anterior tibial artery was open.  There may have been maybe 30% to 50% stenosis at the interosseous membrane.  It went all the way down the leg without any evidence of stenosis, but it was somewhat small.  Just above the ankle, there were collaterals into the peroneal artery which went into the foot, feeding the dorsal pedal artery.  There was poor flow going down in the posterior portion of the foot and was all via collaterals.    Reevaluated the right femoral artery at the end of procedure with oblique view showing that the iliofemoral arterial segment was patent.    At the end of the procedure,  reviewed the films with Dr. Valenzuela, who is also thinking at this point, because it is a small ulceration, we will continue to observe and watch her medically, maybe with surgery, but if there is any worsening, then will go back and do repeat angioplasty to try to enlarge the vessels of the superficial femoral, popliteal, and tibial vessels.  I am not sure if we can get the other tibial vessels open.    The patient received 80 mL of contrast.  The patient's fluoro time was 11.2 minutes.  The DAP was 39,489.     Dictated By Oleg Addison M.D.  d: 03/21/2024 08:48:12  t: 03/21/2024 10:02:27  [facility] [id number]  JMARITO/

## 2024-03-21 NOTE — CONSULTS
[facility]  Report of Consultation    Maggie Pelletier Patient Status:  Inpatient    1945 MRN [id number]   Location [facility] 2NE-A Attending Oleg Addison MD   Hosp Day # 0 PCP LUZ MARIA BLACKBURN     Reason for Consultation:  ESRD    History of Present Illness:  Maggie Pelletier is a a(n) 78 year old female with hx of ESRD on HD (last tx yesterday- has AVF), PAD, CAD who is admitted to hospital for PAD management- underwent diagnostic angiogram this am.  Currently doing well  No complains  No sig groin pain      History:  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 (HCC)     Coronary atherosclerosis     Dialysis patient (HCC)     High cholesterol     History of blood transfusion     Leukemia (HCC)     Renal disorder     Right foot ulcer, with fat layer exposed (HCC)      Past Surgical History:   Procedure Laterality Date    APPENDECTOMY      AV FISTULA OR GRAFT ARTERIAL Left     CABG      CATH PERCUTANEOUS  TRANSLUMINAL CORONARY ANGIOPLASTY      CHOLECYSTECTOMY      OTHER Right     big toe amputation     Family History   Problem Relation Age of Onset    Heart Disorder Father     Cancer Mother       reports that she has quit smoking. She has never used smokeless tobacco. She reports that she does not currently use alcohol. She reports that she does not use drugs.    Allergies:  Allergies   Allergen Reactions    Epinephrine OTHER (SEE COMMENTS)     Dental epi. Pain for 2 weeks after dental procedure.       Current Medications:    Current Facility-Administered Medications:     sodium chloride 0.9% infusion, , Intravenous, Continuous    acetaminophen (Tylenol) tab 650 mg, 650 mg, Oral, Q4H PRN **OR** HYDROcodone-acetaminophen (Norco) 5-325 MG per tab 1 tablet, 1 tablet, Oral, Q4H PRN **OR** HYDROcodone-acetaminophen (Norco) 5-325 MG per tab 2 tablet, 2 tablet, Oral, Q4H PRN    [START ON 3/22/2024] clopidogrel (Plavix) tab 75 mg, 75 mg, Oral,  Daily    ceFAZolin (Ancef) 2 g in 20mL IV syringe premix, 2 g, Intravenous, Once  Home Medications:  No current outpatient medications on file.       Review of Systems:  See HPI; A total of 12 systems reviewed and otherwise unremarkable.    Physical Exam:  Vital signs: Blood pressure 116/45, pulse 76, temperature 97.8 °F (36.6 °C), temperature source Temporal, resp. rate 13, weight 141 lb 9.6 oz (64.2 kg), SpO2 100%.  General: No acute distress. Alert and oriented x 3.  HEENT: Moist mucous membranes. EOM-I. PERRL  Neck: No lymphadenopathy.  No JVD. No carotid bruits.  Respiratory: Clear to auscultation bilaterally.  No wheezes. No rhonchi.  Cardiovascular: S1, S2.  Regular rate and rhythm.  No murmurs. Equal pulses   Abdomen: Soft, nontender, nondistended.  Positive bowel sounds. No rebound tenderness   Neurologic: No focal neurological deficits.   Musculoskeletal: Full range of motion of all extremities.  No swelling noted.   Integument: No lesions. No erythema.  Psychiatric: Appropriate mood and affect.  LUE AVF w/ good bruit    Laboratory:          BUN (mg/dL)   Date Value   02/14/2024 53 (H)   02/13/2024 41 (H)   02/13/2024 40 (H)     Creatinine (mg/dL)   Date Value   02/14/2024 5.74 (H)   02/13/2024 4.61 (H)   02/13/2024 4.31 (H)         Imaging:  Reviewed     Impression:  ESRD on HD MWF; volume status stable   - monitor labs  - plan for HD tomorrow per routine  PAD w/  w/u ongoing-per ; s/p angiogram LE today   CAD hx  Anemia due to CKD; CHAVEZ w/ HD    Thank you for allowing me to participate in this patient's care.  Please feel free to call me with any questions or concerns.    Phil Laurent MD  3/21/2024  9:52 AM

## 2024-03-21 NOTE — PLAN OF CARE
Received patient from interventional suites approx 0900, resting in bed, AOX4, reports no pain. Lung sounds clear, but diminished bilaterally, O2 saturation adequate on room air. No complaints of shortness of breath or chest pain at this time. Sinus rhythm on tele, S1-S2 present, no adventitious sounds noted, frequent PACs. Bowel sounds present and active in all quadrants, last bowel movement 3/20/2024. Patient aneuric due to hemodialysis. Right groin site clean, dry, intact, soft, no hematoma.    Plan of Care: Cath recovery today. Peripheral Angio tomorrow with Dr. Addison. Consults to see.      Discussed plan of care with patient, daughter via telephone, verbalized understanding. Call light within reach.     Problem: Patient/Family Goals  Goal: Patient/Family Long Term Goal  Description: Patient's Long Term Goal: Heal foot ulcers    Interventions:  - Peripheral angiogram  - Wound care  - Follow up appointments  - Medications as ordered  - See additional Care Plan goals for specific interventions  Outcome: Progressing  Goal: Patient/Family Short Term Goal  Description: Patient's Short Term Goal: Go home    Interventions:   - Labs, tele, consults  - Peripheral angio tomorrow   - See additional Care Plan goals for specific interventions  Outcome: Progressing     Problem: CARDIOVASCULAR - ADULT  Goal: Maintains optimal cardiac output and hemodynamic stability  Description: INTERVENTIONS:  - Monitor vital signs, rhythm, and trends  - Monitor for bleeding, hypotension and signs of decreased cardiac output  - Evaluate effectiveness of vasoactive medications to optimize hemodynamic stability  - Monitor arterial and/or venous puncture sites for bleeding and/or hematoma  - Assess quality of pulses, skin color and temperature  - Assess for signs of decreased coronary artery perfusion - ex. Angina  - Evaluate fluid balance, assess for edema, trend weights  Outcome: Progressing  Goal: Absence of cardiac arrhythmias or at  baseline  Description: INTERVENTIONS:  - Continuous cardiac monitoring, monitor vital signs, obtain 12 lead EKG if indicated  - Evaluate effectiveness of antiarrhythmic and heart rate control medications as ordered  - Initiate emergency measures for life threatening arrhythmias  - Monitor electrolytes and administer replacement therapy as ordered  Outcome: Progressing     Problem: SAFETY ADULT - FALL  Goal: Free from fall injury  Description: INTERVENTIONS:  - Assess pt frequently for physical needs  - Identify cognitive and physical deficits and behaviors that affect risk of falls.  - [place] fall precautions as indicated by assessment.  - Educate pt/family on patient safety including physical limitations  - Instruct pt to call for assistance with activity based on assessment  - Modify environment to reduce risk of injury  - Provide assistive devices as appropriate  - Consider OT/PT consult to assist with strengthening/mobility  - Encourage toileting schedule  Outcome: Progressing

## 2024-03-21 NOTE — CONSULTS
[facility], [facility]  TEL: [phone]  FAX: [phone]    Maggie Pelletier Patient Status:  Inpatient    1945 MRN [id number]   Formerly [facility] 2NE-A Attending Oleg Addison MD   Hosp Day # 0 PCP LUZ MARIA BLACKBURN     Reason for Consultation:  Possible right foot infection    History of Present Illness:  Maggie Pelletier is a a(n) 78 year old female.  Who has underlying history of peripheral vascular disease as well as end-stage renal disease on hemodialysis and CLL who had a right second metatarsal head resection February 15, 2024.  She had some breakdown of the wound with underlying history of peripheral vascular disease.  Was admitted after evaluation by podiatry earlier this month.  No fevers or chills.  No increased drainage from the site however her third toe on the right foot has some duskiness.  She has been on oral antibiotics on and off in the past.  She is also noting some discoloration of the left fifth toe which is new.  Currently on cefazolin.  No known trauma to the feet.  No imaging presently.  Following Dr. Addison indication for need for revascularization.  Most recent vascular procedure .  Patient was evaluated under fluoroscopy earlier today.  Monitoring for any need for intervention.      History of CLL.  In remission.  At [facility]    History:  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 (HCC)     Coronary atherosclerosis     Dialysis patient (HCC)     High cholesterol     History of blood transfusion     Leukemia (HCC)     Renal disorder     Right foot ulcer, with fat layer exposed (HCC)      Past Surgical History:   Procedure Laterality Date    APPENDECTOMY      AV FISTULA OR GRAFT ARTERIAL Left     CABG      CATH PERCUTANEOUS  TRANSLUMINAL CORONARY ANGIOPLASTY      CHOLECYSTECTOMY      OTHER Right     big toe amputation     Family History   Problem Relation Age of Onset     Heart Disorder Father     Cancer Mother       reports that she has quit smoking. She has never used smokeless tobacco. She reports that she does not currently use alcohol. She reports that she does not use drugs.    Allergies:  Allergies   Allergen Reactions    Epinephrine OTHER (SEE COMMENTS)     Dental epi. Pain for 2 weeks after dental procedure.       Medications:    Current Facility-Administered Medications:     sodium chloride 0.9% infusion, , Intravenous, Continuous    acetaminophen (Tylenol) tab 650 mg, 650 mg, Oral, Q4H PRN **OR** HYDROcodone-acetaminophen (Norco) 5-325 MG per tab 1 tablet, 1 tablet, Oral, Q4H PRN **OR** HYDROcodone-acetaminophen (Norco) 5-325 MG per tab 2 tablet, 2 tablet, Oral, Q4H PRN    [START ON 3/22/2024] clopidogrel (Plavix) tab 75 mg, 75 mg, Oral, Daily    [START ON 3/22/2024] sodium chloride 0.9 % IV bolus 100 mL, 100 mL, Intravenous, Q30 Min PRN **AND** albumin human (Albumin) 25% injection 25 g, 25 g, Intravenous, PRN Dialysis    [START ON 3/22/2024] lidocaine-prilocaine (Emla) 2.5-2.5 % cream, , Topical, PRN    [START ON 3/22/2024] epoetin rigoberto (Epogen, Procrit) 67489 UNIT/ML injection 10,000 Units, 10,000 Units, Intravenous, Once in dialysis    allopurinol (Zyloprim) tab 300 mg, 300 mg, Oral, Daily    atorvastatin (Lipitor) tab 10 mg, 10 mg, Oral, Nightly    carvedilol (Coreg) tab 3.125 mg, 3.125 mg, Oral, BID with meals    [START ON 3/22/2024] fenofibrate micronized (Lofibra) cap 134 mg, 134 mg, Oral, Daily with breakfast    aspirin DR tab 81 mg, 81 mg, Oral, Daily    Review of Systems:    A comprehensive 10 point review of systems was completed.  Pertinent positives and negatives noted in the the HPI.    Physical Exam:    General: No acute distress. Alert and oriented x 3.  Vital signs: Blood pressure (!) 115/92, pulse 90, temperature 98 °F (36.7 °C), temperature source Oral, resp. rate 16, weight 141 lb 9.6 oz (64.2 kg), SpO2 100%.  HEENT: Moist mucous membranes.  Extraocular muscles are intact.  Neck: No lymphadenopathy.  No JVD. No carotid bruits.  Respiratory: Clear to auscultation bilaterally.  No wheezes. No rhonchi.  Cardiovascular: S1, S2.  Regular rate and rhythm.  No murmurs.  Abdomen: Soft, nontender, nondistended.  Positive bowel sounds.  Musculoskeletal: Left fifth toe has a distal tip wound on the nail.  No ascending lymphangitis or cellulitis.  Right foot with second metatarsal amputation site.  With a linear eschar which appears to be fairly clean.  No surrounding cellulitis.  Duskiness with some early eschar of the third toe.  No ascending lymphangitis or cellulitis.  Good range of motion of her ankle.  No obvious synovitis there.  .  Psychiatric: Appropriate mood and affect.  Neurologic: No focal neurological deficits.    Laboratory Data:  Lab Results   Component Value Date    WBC 5.6 03/21/2024    HGB 11.3 03/21/2024    HCT 30.6 03/21/2024    .0 03/21/2024    CREATSERUM 4.04 03/21/2024    BUN 44 03/21/2024     03/21/2024    K 3.5 03/21/2024     03/21/2024    CO2 30.0 03/21/2024    GLU 99 03/21/2024    CA 8.4 03/21/2024       Microbiologic Data:     Reviewed in EMR    Imaging:  Ultrasound    Imaging results reviewed     Impression:  Patient Active Problem List   Diagnosis    Gangrene of toe of right foot (HCC)    Ischemic pain of foot, right    PAD (peripheral artery disease) ([facility])    Great toe amputation status, right    Gangrene of right foot (HCC)    ESRD (end stage renal disease) ([facility])    Primary hypertension    ESRD on hemodialysis ([facility])    Carotid stenosis, right    Osteomyelitis of right foot (HCC)    Ulcer of right foot, unspecified ulcer stage ([facility])    CLL (chronic lymphocytic leukemia) ([facility])    Status post amputation of lesser toe of right foot ([facility])    Gangrene of toe of left foot (HCC)    Right foot ulcer, with necrosis of bone ([facility])    Coronary artery disease involving native heart without angina pectoris          ASSESSMENT:    #Dry gangrene right foot.  Progression with new toe involved.  Ascending lymphangitis or cellulitis.  With history of peripheral vascular disease.  Question early left fifth toe developing gangrene.   Previous wound 1/2024 with Oxacillin-susceptible Staph santoesis    #Peripheral vascular disease.  With underlying history of coronary artery disease.  Previous evaluation and management February 13, 2024 for PVD.    #End-stage renal disease.  On hemodialysis.  No evidence of calciphylaxis of wounds on the feet    PLAN:  We can maintain Ancef for the next 24 to 48 hours but do not anticipate she will need a long course of IV antibiotic or oral antibiotic therapy if her wounds appeared more vascular rather than secondarily infected.  Any offloading recommendations per podiatry and vascular.  Will follow-up tomorrow for any plans for revascularization.  She spikes a fever, blood cultures x 2.      Thank you for allowing me to participate in the care of your patient.    Yeyo Alvares MD  3/21/2024  3:48 PM

## 2024-03-21 NOTE — PROGRESS NOTES
Assumes pt care Pt is alert x 4, on Ra, NSR on the monitor. PT denies any cardiovascular symptoms at this time. Pt is stand pivot, continent of B/B. All needs met and will continue to monitor.       PLAN: NPO at midnight, right foot angiogram,       POC: Discussed and updated with pt, pt verbalized understanding.

## 2024-03-21 NOTE — CONSULTS
[facility]   part of [facility]    Report of Consultation    Maggie Pelletier Patient Status:  Inpatient    1945 MRN [id number]   Location [facility] 2NE-A Attending Oleg Addison MD   Hosp Day # 0 PCP LUZ MARIA BLACKBURN     Date of Admission:  3/21/2024  Date of Consult: 3/21/2024    Reason for Consultation: Ulcerations of the toes on both feet gangrenous changes left  Consultation requested by Oleg Addison MD    History of Present Illness:  Maggie Pelletier is a a(n) 78 year old female.  This pleasant patient has a long history of revascularization to Dr. Addison's note showing what she has done.  She is already had an angiogram on the left side but after discussion with Dr. Valenzuela both Dr. Addison and Dr. Valenzuela do not think anything should be done she only has a very small sore on the fifth toe.  On the right foot however she is already had multiple surgeries including partial first ray amputation partial second ray amputation and now she has an open draining wound over the second metatarsal and she has associated necrotic changes noted to the third toe adjacent to that.  There does not seem to be any malodor.  Not express any drainage but the patient does have fairly severe pain.    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 She has never used smokeless  tobacco. She reports that she does not currently use alcohol. She reports that she does not use drugs.    Allergies:  Allergies   Allergen Reactions    Epinephrine OTHER (SEE COMMENTS)     Dental epi. Pain for 2 weeks after dental procedure.       Medications:    Current Facility-Administered Medications:     sodium chloride 0.9% infusion, , Intravenous, Continuous    acetaminophen (Tylenol) tab 650 mg, 650 mg, Oral, Q4H PRN **OR** HYDROcodone-acetaminophen (Norco) 5-325 MG per tab 1 tablet, 1 tablet, Oral, Q4H PRN **OR** HYDROcodone-acetaminophen (Norco) 5-325 MG per tab 2 tablet, 2 tablet, Oral, Q4H PRN    [START ON 3/22/2024] clopidogrel (Plavix) tab 75 mg, 75 mg, Oral, Daily    [START ON 3/22/2024] sodium chloride 0.9 % IV bolus 100 mL, 100 mL, Intravenous, Q30 Min PRN **AND** albumin human (Albumin) 25% injection 25 g, 25 g, Intravenous, PRN Dialysis    [START ON 3/22/2024] lidocaine-prilocaine (Emla) 2.5-2.5 % cream, , Topical, PRN    [START ON 3/22/2024] epoetin rigoberto (Epogen, Procrit) 35517 UNIT/ML injection 10,000 Units, 10,000 Units, Intravenous, Once in dialysis    allopurinol (Zyloprim) tab 300 mg, 300 mg, Oral, Daily    atorvastatin (Lipitor) tab 10 mg, 10 mg, Oral, Nightly    carvedilol (Coreg) tab 3.125 mg, 3.125 mg, Oral, BID with meals    [START ON 3/22/2024] fenofibrate micronized (Lofibra) cap 134 mg, 134 mg, Oral, Daily with breakfast    aspirin DR tab 81 mg, 81 mg, Oral, Daily    Review of Systems:  Musculoskeletal:negative, the patient is resting comfortably in bed nurses at bedside    Physical Exam:  Integument: Patient's skin was examined on both feet.  On the left side she has an open wound that probes somewhat down to the second metatarsal head area on the dorsum of the foot and there are necrotic changes noted to the third toe adjacent to there there is an incision with a stitch still in it underneath the second metatarsal head area.  Fifth toe has a necrotic pinch callus on the plantar  aspect of the fifth toe distally under the fat pad.  Vascular: Cannot readily palpate any pulses the patient has a known history of severe PAD.  I did review Dr. Addison's notes.  Using a hand-held Doppler I could get a dopplerable pulse at the dorsalis pedis could not really get too much of a posterior tibial bilateral but on the fifth toe where she has a skin change from a pinch callus that has become necrotic shows that she has a dopplerable pulse sound at the dorsal medial aspect of the base of the fifth toe.  Neurologic: The patient has intact sensorium pain sensations intact  Musculoskeletal: Patient has a hallux valgus deformity on the left foot with a adductovarus hammertoe which underlapped the fourth toe.  This is because the pinch callus which eventually ulcerated.  On the right foot she has amputations of the first and second toes and part of the first metatarsal head.  Lesser toes on that foot are in a hammered position.         Laboratory Data:  Recent Labs   Lab 03/21/24  1126   RBC 2.96*   HGB 11.3*   HCT 30.6*   .4*   MCH 38.2*   MCHC 36.9   RDW 16.1   NEPRELIM 2.74   WBC 5.6   .0*       Impression and Plan:  Patient Active Problem List   Diagnosis    Gangrene of toe of right foot (HCC)    Ischemic pain of foot, right    PAD (peripheral artery disease) (HCC)    Great toe amputation status, right    Gangrene of right foot (HCC)    ESRD (end stage renal disease) (HCC)    Primary hypertension    ESRD on hemodialysis (HCC)    Carotid stenosis, right    Osteomyelitis of right foot (HCC)    Ulcer of right foot, unspecified ulcer stage (HCC)    CLL (chronic lymphocytic leukemia) (HCC)    Status post amputation of lesser toe of right foot (HCC)    Gangrene of toe of left foot (HCC)    Right foot ulcer, with necrosis of bone (HCC)    Coronary artery disease involving native heart without angina pectoris       Today I discussed with the patient that the most important thing needed for any  ulceration or surgery to heal is adequate blood flow which I will leave to Dr. Addison and Dr. Galarza.  I emphasized to the patient that this is a great team and she would have a very good chance with them.  I will await their good work.  But for right now continue antibiotics and continue local wound care with Betadine but I think a consult to the wound care nurses especially for that fifth toe might be beneficial if they could consider something like Medihoney or Santyl because of the weak pulse signal I got at the base of the toe.  Local restrictions in place minimal walking while in the hospital.    Evans Ferguson DPM   3/21/2024  4:33 PM

## 2024-03-22 ENCOUNTER — ANESTHESIA (OUTPATIENT)
Dept: CARDIAC SURGERY | Facility: HOSPITAL | Age: 79
End: 2024-03-22
Payer: MEDICARE

## 2024-03-22 LAB
ANION GAP SERPL CALC-SCNC: 11 MMOL/L (ref 0–18)
ANION GAP SERPL CALC-SCNC: 14 MMOL/L (ref 0–18)
APTT PPP: 151.3 SECONDS (ref 23.3–35.6)
ATRIAL RATE: 77 BPM
BASOPHILS # BLD: 0 X10(3) UL (ref 0–0.2)
BASOPHILS NFR BLD: 0 %
BUN BLD-MCNC: 49 MG/DL (ref 9–23)
BUN BLD-MCNC: 56 MG/DL (ref 9–23)
CALCIUM BLD-MCNC: 8.3 MG/DL (ref 8.5–10.1)
CALCIUM BLD-MCNC: 8.6 MG/DL (ref 8.5–10.1)
CHLORIDE SERPL-SCNC: 98 MMOL/L (ref 98–112)
CHLORIDE SERPL-SCNC: 99 MMOL/L (ref 98–112)
CO2 SERPL-SCNC: 23 MMOL/L (ref 21–32)
CO2 SERPL-SCNC: 26 MMOL/L (ref 21–32)
CREAT BLD-MCNC: 4.94 MG/DL
CREAT BLD-MCNC: 5.37 MG/DL
EGFRCR SERPLBLD CKD-EPI 2021: 8 ML/MIN/1.73M2 (ref 60–?)
EGFRCR SERPLBLD CKD-EPI 2021: 8 ML/MIN/1.73M2 (ref 60–?)
EOSINOPHIL # BLD: 0 X10(3) UL (ref 0–0.7)
EOSINOPHIL NFR BLD: 0 %
ERYTHROCYTE [DISTWIDTH] IN BLOOD BY AUTOMATED COUNT: 15.7 %
GLUCOSE BLD-MCNC: 125 MG/DL (ref 70–99)
GLUCOSE BLD-MCNC: 88 MG/DL (ref 70–99)
HBV SURFACE AG SER-ACNC: 0.49 [IU]/L
HBV SURFACE AG SERPL QL IA: NONREACTIVE
HCT VFR BLD AUTO: 23.7 %
HGB BLD-MCNC: 8.6 G/DL
INR BLD: 1.53 (ref 0.8–1.2)
ISTAT ACTIVATED CLOTTING TIME: 276 SECONDS (ref 74–137)
ISTAT ACTIVATED CLOTTING TIME: 298 SECONDS (ref 74–137)
ISTAT ACTIVATED CLOTTING TIME: 314 SECONDS (ref 74–137)
LYMPHOCYTES NFR BLD: 0.46 X10(3) UL (ref 1–4)
LYMPHOCYTES NFR BLD: 7 %
MAGNESIUM SERPL-MCNC: 2 MG/DL (ref 1.6–2.6)
MCH RBC QN AUTO: 38.2 PG (ref 26–34)
MCHC RBC AUTO-ENTMCNC: 36.3 G/DL (ref 31–37)
MCV RBC AUTO: 105.3 FL
MONOCYTES # BLD: 0.2 X10(3) UL (ref 0.1–1)
MONOCYTES NFR BLD: 3 %
MRSA DNA SPEC QL NAA+PROBE: NEGATIVE
NEUTROPHILS # BLD AUTO: 5.84 X10 (3) UL (ref 1.5–7.7)
NEUTROPHILS NFR BLD: 89 %
NEUTS BAND NFR BLD: 1 %
NEUTS HYPERSEG # BLD: 5.94 X10(3) UL (ref 1.5–7.7)
OSMOLALITY SERPL CALC.SUM OF ELEC: 292 MOSM/KG (ref 275–295)
OSMOLALITY SERPL CALC.SUM OF ELEC: 299 MOSM/KG (ref 275–295)
P AXIS: 64 DEGREES
P-R INTERVAL: 222 MS
PLATELET # BLD AUTO: 134 10(3)UL (ref 150–450)
PLATELET MORPHOLOGY: NORMAL
POTASSIUM SERPL-SCNC: 3.6 MMOL/L (ref 3.5–5.1)
POTASSIUM SERPL-SCNC: 4 MMOL/L (ref 3.5–5.1)
PROTHROMBIN TIME: 18.5 SECONDS (ref 11.6–14.8)
Q-T INTERVAL: 440 MS
QRS DURATION: 136 MS
QTC CALCULATION (BEZET): 497 MS
R AXIS: 129 DEGREES
RBC # BLD AUTO: 2.25 X10(6)UL
SODIUM SERPL-SCNC: 135 MMOL/L (ref 136–145)
SODIUM SERPL-SCNC: 136 MMOL/L (ref 136–145)
T AXIS: -36 DEGREES
TOTAL CELLS COUNTED BLD: 100
VENTRICULAR RATE: 77 BPM
WBC # BLD AUTO: 6.6 X10(3) UL (ref 4–11)

## 2024-03-22 PROCEDURE — 04CQ3ZZ EXTIRPATION OF MATTER FROM LEFT ANTERIOR TIBIAL ARTERY, PERCUTANEOUS APPROACH: ICD-10-PCS | Performed by: SURGERY

## 2024-03-22 PROCEDURE — B41G1ZZ FLUOROSCOPY OF LEFT LOWER EXTREMITY ARTERIES USING LOW OSMOLAR CONTRAST: ICD-10-PCS | Performed by: SURGERY

## 2024-03-22 PROCEDURE — 90935 HEMODIALYSIS ONE EVALUATION: CPT | Performed by: INTERNAL MEDICINE

## 2024-03-22 PROCEDURE — B41F1ZZ FLUOROSCOPY OF RIGHT LOWER EXTREMITY ARTERIES USING LOW OSMOLAR CONTRAST: ICD-10-PCS | Performed by: SURGERY

## 2024-03-22 PROCEDURE — 047K3Z1 DILATION OF RIGHT FEMORAL ARTERY USING DRUG-COATED BALLOON, PERCUTANEOUS APPROACH: ICD-10-PCS | Performed by: SURGERY

## 2024-03-22 PROCEDURE — 047M3ZZ DILATION OF RIGHT POPLITEAL ARTERY, PERCUTANEOUS APPROACH: ICD-10-PCS | Performed by: SURGERY

## 2024-03-22 PROCEDURE — 99232 SBSQ HOSP IP/OBS MODERATE 35: CPT | Performed by: INTERNAL MEDICINE

## 2024-03-22 PROCEDURE — 047Q3ZZ DILATION OF LEFT ANTERIOR TIBIAL ARTERY, PERCUTANEOUS APPROACH: ICD-10-PCS | Performed by: SURGERY

## 2024-03-22 PROCEDURE — 04CL3ZZ EXTIRPATION OF MATTER FROM LEFT FEMORAL ARTERY, PERCUTANEOUS APPROACH: ICD-10-PCS | Performed by: SURGERY

## 2024-03-22 PROCEDURE — 047N3Z1 DILATION OF LEFT POPLITEAL ARTERY USING DRUG-COATED BALLOON, PERCUTANEOUS APPROACH: ICD-10-PCS | Performed by: SURGERY

## 2024-03-22 PROCEDURE — 047L3Z1 DILATION OF LEFT FEMORAL ARTERY USING DRUG-COATED BALLOON, PERCUTANEOUS APPROACH: ICD-10-PCS | Performed by: SURGERY

## 2024-03-22 PROCEDURE — 3C1ZX8Z IRRIGATION OF INDWELLING DEVICE USING IRRIGATING SUBSTANCE, EXTERNAL APPROACH: ICD-10-PCS | Performed by: SURGERY

## 2024-03-22 PROCEDURE — 5A1D70Z PERFORMANCE OF URINARY FILTRATION, INTERMITTENT, LESS THAN 6 HOURS PER DAY: ICD-10-PCS | Performed by: INTERNAL MEDICINE

## 2024-03-22 DEVICE — ANGIO-SEAL VIP VASCULAR CLOSURE DEVICE
Type: IMPLANTABLE DEVICE | Status: FUNCTIONAL
Brand: ANGIO-SEAL

## 2024-03-22 RX ORDER — ONDANSETRON 2 MG/ML
INJECTION INTRAMUSCULAR; INTRAVENOUS AS NEEDED
Status: DISCONTINUED | OUTPATIENT
Start: 2024-03-22 | End: 2024-03-22 | Stop reason: SURG

## 2024-03-22 RX ORDER — HYDROMORPHONE HYDROCHLORIDE 1 MG/ML
0.6 INJECTION, SOLUTION INTRAMUSCULAR; INTRAVENOUS; SUBCUTANEOUS EVERY 5 MIN PRN
Status: ACTIVE | OUTPATIENT
Start: 2024-03-22 | End: 2024-03-23

## 2024-03-22 RX ORDER — IODIXANOL 320 MG/ML
100 INJECTION, SOLUTION INTRAVASCULAR
Status: DISCONTINUED | OUTPATIENT
Start: 2024-03-22 | End: 2024-03-24

## 2024-03-22 RX ORDER — HEPARIN SODIUM 1000 [USP'U]/ML
INJECTION, SOLUTION INTRAVENOUS; SUBCUTANEOUS AS NEEDED
Status: DISCONTINUED | OUTPATIENT
Start: 2024-03-22 | End: 2024-03-22 | Stop reason: SURG

## 2024-03-22 RX ORDER — CARVEDILOL 3.12 MG/1
3.12 TABLET ORAL 2 TIMES DAILY WITH MEALS
Status: DISCONTINUED | OUTPATIENT
Start: 2024-03-22 | End: 2024-03-24

## 2024-03-22 RX ORDER — HYDROCODONE BITARTRATE AND ACETAMINOPHEN 5; 325 MG/1; MG/1
2 TABLET ORAL EVERY 4 HOURS PRN
Status: DISCONTINUED | OUTPATIENT
Start: 2024-03-22 | End: 2024-03-24

## 2024-03-22 RX ORDER — ACETAMINOPHEN 325 MG/1
650 TABLET ORAL EVERY 4 HOURS PRN
Status: DISCONTINUED | OUTPATIENT
Start: 2024-03-22 | End: 2024-03-24

## 2024-03-22 RX ORDER — CEFAZOLIN SODIUM/WATER 2 G/20 ML
SYRINGE (ML) INTRAVENOUS AS NEEDED
Status: DISCONTINUED | OUTPATIENT
Start: 2024-03-22 | End: 2024-03-22 | Stop reason: SURG

## 2024-03-22 RX ORDER — SODIUM CHLORIDE, SODIUM LACTATE, POTASSIUM CHLORIDE, CALCIUM CHLORIDE 600; 310; 30; 20 MG/100ML; MG/100ML; MG/100ML; MG/100ML
INJECTION, SOLUTION INTRAVENOUS CONTINUOUS
Status: DISCONTINUED | OUTPATIENT
Start: 2024-03-22 | End: 2024-03-24

## 2024-03-22 RX ORDER — SODIUM CHLORIDE 9 MG/ML
INJECTION, SOLUTION INTRAVENOUS CONTINUOUS PRN
Status: DISCONTINUED | OUTPATIENT
Start: 2024-03-22 | End: 2024-03-22 | Stop reason: SURG

## 2024-03-22 RX ORDER — HYDROMORPHONE HYDROCHLORIDE 1 MG/ML
0.2 INJECTION, SOLUTION INTRAMUSCULAR; INTRAVENOUS; SUBCUTANEOUS EVERY 5 MIN PRN
Status: ACTIVE | OUTPATIENT
Start: 2024-03-22 | End: 2024-03-23

## 2024-03-22 RX ORDER — ALBUMIN (HUMAN) 12.5 G/50ML
25 SOLUTION INTRAVENOUS
Status: DISCONTINUED | OUTPATIENT
Start: 2024-03-22 | End: 2024-03-24

## 2024-03-22 RX ORDER — ROCURONIUM BROMIDE 10 MG/ML
INJECTION, SOLUTION INTRAVENOUS AS NEEDED
Status: DISCONTINUED | OUTPATIENT
Start: 2024-03-22 | End: 2024-03-22 | Stop reason: SURG

## 2024-03-22 RX ORDER — ATORVASTATIN CALCIUM 20 MG/1
20 TABLET, FILM COATED ORAL NIGHTLY
Status: DISCONTINUED | OUTPATIENT
Start: 2024-03-22 | End: 2024-03-24

## 2024-03-22 RX ORDER — CLOPIDOGREL BISULFATE 75 MG/1
75 TABLET ORAL DAILY
Status: DISCONTINUED | OUTPATIENT
Start: 2024-03-23 | End: 2024-03-24

## 2024-03-22 RX ORDER — NALOXONE HYDROCHLORIDE 0.4 MG/ML
0.08 INJECTION, SOLUTION INTRAMUSCULAR; INTRAVENOUS; SUBCUTANEOUS AS NEEDED
Status: ACTIVE | OUTPATIENT
Start: 2024-03-22 | End: 2024-03-23

## 2024-03-22 RX ORDER — ONDANSETRON 2 MG/ML
4 INJECTION INTRAMUSCULAR; INTRAVENOUS EVERY 6 HOURS PRN
Status: DISCONTINUED | OUTPATIENT
Start: 2024-03-22 | End: 2024-03-24

## 2024-03-22 RX ORDER — MIDODRINE HYDROCHLORIDE 10 MG/1
10 TABLET ORAL
Status: DISCONTINUED | OUTPATIENT
Start: 2024-03-22 | End: 2024-03-24

## 2024-03-22 RX ORDER — VANCOMYCIN HYDROCHLORIDE 125 MG/1
125 CAPSULE ORAL 2 TIMES DAILY
Status: DISCONTINUED | OUTPATIENT
Start: 2024-03-22 | End: 2024-03-24

## 2024-03-22 RX ORDER — LIDOCAINE HYDROCHLORIDE 10 MG/ML
INJECTION, SOLUTION EPIDURAL; INFILTRATION; INTRACAUDAL; PERINEURAL AS NEEDED
Status: DISCONTINUED | OUTPATIENT
Start: 2024-03-22 | End: 2024-03-22 | Stop reason: SURG

## 2024-03-22 RX ORDER — ALBUMIN (HUMAN) 12.5 G/50ML
25 SOLUTION INTRAVENOUS ONCE
Status: COMPLETED | OUTPATIENT
Start: 2024-03-22 | End: 2024-03-22

## 2024-03-22 RX ORDER — SODIUM CHLORIDE 9 MG/ML
INJECTION, SOLUTION INTRAVENOUS ONCE
Status: COMPLETED | OUTPATIENT
Start: 2024-03-22 | End: 2024-03-22

## 2024-03-22 RX ORDER — HYDROCODONE BITARTRATE AND ACETAMINOPHEN 5; 325 MG/1; MG/1
1 TABLET ORAL EVERY 4 HOURS PRN
Status: DISCONTINUED | OUTPATIENT
Start: 2024-03-22 | End: 2024-03-24

## 2024-03-22 RX ORDER — HYDROMORPHONE HYDROCHLORIDE 1 MG/ML
0.4 INJECTION, SOLUTION INTRAMUSCULAR; INTRAVENOUS; SUBCUTANEOUS EVERY 5 MIN PRN
Status: ACTIVE | OUTPATIENT
Start: 2024-03-22 | End: 2024-03-23

## 2024-03-22 RX ORDER — DEXAMETHASONE SODIUM PHOSPHATE 4 MG/ML
VIAL (ML) INJECTION AS NEEDED
Status: DISCONTINUED | OUTPATIENT
Start: 2024-03-22 | End: 2024-03-22 | Stop reason: SURG

## 2024-03-22 RX ORDER — NITROGLYCERIN 20 MG/100ML
INJECTION INTRAVENOUS
Status: COMPLETED
Start: 2024-03-22 | End: 2024-03-22

## 2024-03-22 RX ADMIN — CLOPIDOGREL BISULFATE 75 MG: 75 TABLET ORAL at 08:26:00

## 2024-03-22 RX ADMIN — DEXAMETHASONE SODIUM PHOSPHATE 4 MG: 4 MG/ML VIAL (ML) INJECTION at 12:14:00

## 2024-03-22 RX ADMIN — HEPARIN SODIUM 1000 UNITS: 1000 INJECTION, SOLUTION INTRAVENOUS; SUBCUTANEOUS at 15:30:00

## 2024-03-22 RX ADMIN — ALBUMIN (HUMAN) 25 G: 12.5 SOLUTION INTRAVENOUS at 18:04:00

## 2024-03-22 RX ADMIN — ROCURONIUM BROMIDE 10 MG: 10 INJECTION, SOLUTION INTRAVENOUS at 14:05:00

## 2024-03-22 RX ADMIN — SODIUM CHLORIDE 500 ML: 9 INJECTION, SOLUTION INTRAVENOUS at 18:22:00

## 2024-03-22 RX ADMIN — ONDANSETRON 4 MG: 2 INJECTION INTRAMUSCULAR; INTRAVENOUS at 16:49:00

## 2024-03-22 RX ADMIN — SODIUM CHLORIDE: 9 INJECTION, SOLUTION INTRAVENOUS at 11:52:00

## 2024-03-22 RX ADMIN — HYDROCODONE BITARTRATE AND ACETAMINOPHEN 2 TABLET: 5; 325 TABLET ORAL at 20:10:00

## 2024-03-22 RX ADMIN — HEPARIN SODIUM 1000 UNITS: 1000 INJECTION, SOLUTION INTRAVENOUS; SUBCUTANEOUS at 15:51:00

## 2024-03-22 RX ADMIN — HEPARIN SODIUM 2000 UNITS: 1000 INJECTION, SOLUTION INTRAVENOUS; SUBCUTANEOUS at 13:47:00

## 2024-03-22 RX ADMIN — ROCURONIUM BROMIDE 10 MG: 10 INJECTION, SOLUTION INTRAVENOUS at 13:04:00

## 2024-03-22 RX ADMIN — HEPARIN SODIUM 2000 UNITS: 1000 INJECTION, SOLUTION INTRAVENOUS; SUBCUTANEOUS at 14:17:00

## 2024-03-22 RX ADMIN — VANCOMYCIN HYDROCHLORIDE 125 MG: 125 CAPSULE ORAL at 20:11:00

## 2024-03-22 RX ADMIN — CEFAZOLIN SODIUM/WATER 2 G: 2 G/20 ML SYRINGE (ML) INTRAVENOUS at 16:24:00

## 2024-03-22 RX ADMIN — HEPARIN SODIUM 2000 UNITS: 1000 INJECTION, SOLUTION INTRAVENOUS; SUBCUTANEOUS at 13:33:00

## 2024-03-22 RX ADMIN — HYDROCODONE BITARTRATE AND ACETAMINOPHEN 1 TABLET: 5; 325 TABLET ORAL at 05:35:00

## 2024-03-22 RX ADMIN — LIDOCAINE HYDROCHLORIDE 50 MG: 10 INJECTION, SOLUTION EPIDURAL; INFILTRATION; INTRACAUDAL; PERINEURAL at 12:14:00

## 2024-03-22 RX ADMIN — ASPIRIN 81 MG: 81 TABLET ORAL at 08:30:00

## 2024-03-22 RX ADMIN — ATORVASTATIN CALCIUM 20 MG: 20 TABLET, FILM COATED ORAL at 20:11:00

## 2024-03-22 RX ADMIN — HEPARIN SODIUM 8000 UNITS: 1000 INJECTION, SOLUTION INTRAVENOUS; SUBCUTANEOUS at 13:11:00

## 2024-03-22 RX ADMIN — ROCURONIUM BROMIDE 50 MG: 10 INJECTION, SOLUTION INTRAVENOUS at 12:14:00

## 2024-03-22 RX ADMIN — CEFAZOLIN SODIUM/WATER 2 G: 2 G/20 ML SYRINGE (ML) INTRAVENOUS at 12:20:00

## 2024-03-22 NOTE — PLAN OF CARE
Assumed care of pt around 1930. Alert & orientated x4. SPO2 maintained on room air. Clear lung sounds. Denies shortness of breath. NSR w/ frequent PVCs, on tele. Denies cardiac symptoms. R groin site with dressing CDI, soft, and no hematoma palpated. Anuric, continent of bowel. BL feet ulcers present. Denies pain. Ambulates with standby assist and walker, tolerates well. Pt and daughter updated on plan of care and agreeable. Fall precautions in place. Call light within reach.  Plan: NPO @ 0000 for R peripheral angio, HD after angio    Problem: Patient/Family Goals  Goal: Patient/Family Long Term Goal  Description: Patient's Long Term Goal: Heal foot ulcers    Interventions:  - Peripheral angiogram  - Wound care  - Follow up appointments  - Medications as ordered  - See additional Care Plan goals for specific interventions  Outcome: Progressing  Goal: Patient/Family Short Term Goal  Description: Patient's Short Term Goal: Go home    Interventions:   - Labs, tele, consults  - Peripheral angio tomorrow   - See additional Care Plan goals for specific interventions  Outcome: Progressing     Problem: CARDIOVASCULAR - ADULT  Goal: Maintains optimal cardiac output and hemodynamic stability  Description: INTERVENTIONS:  - Monitor vital signs, rhythm, and trends  - Monitor for bleeding, hypotension and signs of decreased cardiac output  - Evaluate effectiveness of vasoactive medications to optimize hemodynamic stability  - Monitor arterial and/or venous puncture sites for bleeding and/or hematoma  - Assess quality of pulses, skin color and temperature  - Assess for signs of decreased coronary artery perfusion - ex. Angina  - Evaluate fluid balance, assess for edema, trend weights  Outcome: Progressing  Goal: Absence of cardiac arrhythmias or at baseline  Description: INTERVENTIONS:  - Continuous cardiac monitoring, monitor vital signs, obtain 12 lead EKG if indicated  - Evaluate effectiveness of antiarrhythmic and heart rate  control medications as ordered  - Initiate emergency measures for life threatening arrhythmias  - Monitor electrolytes and administer replacement therapy as ordered  Outcome: Progressing     Problem: SAFETY ADULT - FALL  Goal: Free from fall injury  Description: INTERVENTIONS:  - Assess pt frequently for physical needs  - Identify cognitive and physical deficits and behaviors that affect risk of falls.  - [place] fall precautions as indicated by assessment.  - Educate pt/family on patient safety including physical limitations  - Instruct pt to call for assistance with activity based on assessment  - Modify environment to reduce risk of injury  - Provide assistive devices as appropriate  - Consider OT/PT consult to assist with strengthening/mobility  - Encourage toileting schedule  Outcome: Progressing

## 2024-03-22 NOTE — PROGRESS NOTES
[facility]  Progress Note    Maggie Pelletier Patient Status:  Inpatient    1945 MRN [id number]   [facility] 2NE-A Attending Oleg Addison MD   Hosp Day # 0 PCP LUZ MARIA BLACKBURN        No complains  HD today       Current Medications:    Current Facility-Administered Medications:     sodium chloride 0.9% infusion, , Intravenous, Continuous    acetaminophen (Tylenol) tab 650 mg, 650 mg, Oral, Q4H PRN **OR** HYDROcodone-acetaminophen (Norco) 5-325 MG per tab 1 tablet, 1 tablet, Oral, Q4H PRN **OR** HYDROcodone-acetaminophen (Norco) 5-325 MG per tab 2 tablet, 2 tablet, Oral, Q4H PRN    clopidogrel (Plavix) tab 75 mg, 75 mg, Oral, Daily    sodium chloride 0.9 % IV bolus 100 mL, 100 mL, Intravenous, Q30 Min PRN **AND** albumin human (Albumin) 25% injection 25 g, 25 g, Intravenous, PRN Dialysis    lidocaine-prilocaine (Emla) 2.5-2.5 % cream, , Topical, PRN    epoetin rigoberto (Epogen, Procrit) 61106 UNIT/ML injection 10,000 Units, 10,000 Units, Intravenous, Once in dialysis    allopurinol (Zyloprim) tab 300 mg, 300 mg, Oral, Daily    atorvastatin (Lipitor) tab 10 mg, 10 mg, Oral, Nightly    carvedilol (Coreg) tab 3.125 mg, 3.125 mg, Oral, BID with meals    fenofibrate micronized (Lofibra) cap 134 mg, 134 mg, Oral, Daily with breakfast    aspirin DR tab 81 mg, 81 mg, Oral, Daily  Home Medications:  No current outpatient medications on file.       Review of Systems:  See HPI; A total of 12 systems reviewed and otherwise unremarkable.    Physical Exam:  Vital signs: Blood pressure 127/44, pulse 86, temperature 98 °F (36.7 °C), temperature source Oral, resp. rate 14, weight 141 lb 9.6 oz (64.2 kg), SpO2 100%.  General: No acute distress. Alert and oriented x 3.  HEENT: Moist mucous membranes. EOM-I. PERRL  Neck: No lymphadenopathy.  No JVD. No carotid bruits.  Respiratory: Clear to auscultation bilaterally.  No wheezes. No rhonchi.  Cardiovascular: S1, S2.  Regular rate and rhythm.  No murmurs. Equal  pulses   Abdomen: Soft, nontender, nondistended.  Positive bowel sounds. No rebound tenderness   Neurologic: No focal neurological deficits.   Musculoskeletal: Full range of motion of all extremities.  No swelling noted.   Integument: No lesions. No erythema.  Psychiatric: Appropriate mood and affect.  LUE AVF w/ good bruit  Recent Labs     03/21/24  1126   WBC 5.6   HGB 11.3*   .4*   .0*       Recent Labs     03/21/24  1126 03/22/24  0626    135*   K 3.5 3.6    98   CO2 30.0 26.0   BUN 44* 49*   CREATSERUM 4.04* 4.94*   CA 8.4* 8.6   MG  --  2.0       No results for input(s): \"ALT\", \"AST\", \"ALB\", \"AMYLASE\", \"LIPASE\", \"LDH\" in the last 72 hours.    Invalid input(s): \"ALPHOS\", \"TBIL\", \"DBIL\", \"TPROT\"      Imaging:  Reviewed     Impression:  ESRD on HD MWF; volume status stable  -  HD today  per routine  PAD w/  w/u ongoing-per /Podiatry service  CAD hx  Anemia due to CKD; CHAVEZ w/ HD    Thank you for allowing me to participate in this patient's care.  Please feel free to call me with any questions or concerns.    Phil Laurent MD  3/22/2024

## 2024-03-22 NOTE — ANESTHESIA PROCEDURE NOTES
Airway  Date/Time: 3/22/2024 12:14 PM  Urgency: elective      General Information and Staff    Patient location during procedure: OR  Anesthesiologist: Marcus Miramontes MD  Performed: anesthesiologist   Performed by: Marcus Miramontes MD  Authorized by: Marcus Miramontes MD      Indications and Patient Condition  Indications for airway management: anesthesia  Sedation level: deep  Preoxygenated: yes  Patient position: sniffing  Mask difficulty assessment: 1 - vent by mask    Final Airway Details  Final airway type: endotracheal airway      Successful airway: ETT  Cuffed: yes   Successful intubation technique: direct laryngoscopy  Endotracheal tube insertion site: oral  Blade: GlideScope  Blade size: #3  ETT size (mm): 7.0    Placement verified by: capnometry   Cuff volume (mL): 7  Measured from: lips  ETT to lips (cm): 21  Number of attempts at approach: 1    Additional Comments  atraumatic

## 2024-03-22 NOTE — ANESTHESIA POSTPROCEDURE EVALUATION
[facility]    Maggie Pelletier Patient Status:  Inpatient   Age/Gender 78 year old female MRN [id number]   Location [facility] 6NE-A Attending Oleg Addison MD   Hosp Day # 0 PCP LUZ MARIA BLACKBURN       Anesthesia Post-op Note    BILATERAL LEG ANGIOGRAM, ANGIOPLASTY, AND SHOCKWAVE OF LEFT SUPERFICIAL FEMORAL ARTERY    Procedure Summary       Date: 03/22/24 Room / Location:  CVOR 03 HYBRID /  CVOR    Anesthesia Start: 1152 Anesthesia Stop: 1724    Procedures:       BILATERAL LEG ANGIOGRAM, ANGIOPLASTY, AND SHOCKWAVE OF LEFT SUPERFICIAL FEMORAL ARTERY (Bilateral)      ANGIOGRAM ADD-ON (Bilateral) Diagnosis: (gangrene)    Surgeons: Oleg Addison MD Anesthesiologist: Del Gonzalez DO    Anesthesia Type: general ASA Status: 3            Anesthesia Type: general    Vitals Value Taken Time   /60 03/22/24 1724   Temp 97 03/22/24 1724   Pulse 86 03/22/24 1722   Resp 16 03/22/24 1722   SpO2 95 % 03/22/24 1722   Vitals shown include unfiled device data.    Patient Location: ICU    Anesthesia Type: general    Airway Patency: patent and extubated    Postop Pain Control: adequate    Mental Status: mildly sedated but able to meaningfully participate in the post-anesthesia evaluation    Nausea/Vomiting: none    Cardiopulmonary/Hydration status: stable euvolemic    Complications: no apparent anesthesia related complications    Postop vital signs: stable    Dental Exam: Unchanged from Preop    Sign out given to ICU staff.

## 2024-03-22 NOTE — PROGRESS NOTES
[facility]   part of [facility]erg[facility] with [facility]     Hospitalist Progress Note     Maggie Pelletier Patient Status:  Outpatient in a Bed    1945 MRN [id number]   Location [facility] 2NE-A Attending Oleg Addison MD   Hosp Day # 0 PCP LUZ MARIA BLACKBURN     Chief Complaint: med management    Subjective:     Patient without acute events overnith. Plan for angiogram today. Pain controlled. No F/C. HD after procedure.     Objective:    Review of Systems:   A comprehensive review of systems was completed; pertinent positive and negatives stated in subjective.    Vital signs:  Temp:  [97.4 °F (36.3 °C)-98.2 °F (36.8 °C)] 98 °F (36.7 °C)  Pulse:  [80-90] 86  Resp:  [14-16] 14  BP: ()/(32-92) 127/44  SpO2:  [99 %-100 %] 100 %    Physical Exam:    General: No acute distress  Respiratory: No wheezes, no rhonchi  Cardiovascular: S1, S2, regular rate and rhythm  Abdomen: Soft, Non-tender, non-distended, positive bowel sounds  Neuro: No new focal deficits.   Extremities: No edema, feet bandaged      Diagnostic Data:    Labs:  Recent Labs   Lab 24  1126   WBC 5.6   HGB 11.3*   .4*   .0*       Recent Labs   Lab 24  1126 24  0626   GLU 99 88   BUN 44* 49*   CREATSERUM 4.04* 4.94*   CA 8.4* 8.6    135*   K 3.5 3.6    98   CO2 30.0 26.0       Estimated Creatinine Clearance: 6.7 mL/min (A) (based on SCr of 4.94 mg/dL (H)).    No results for input(s): \"TROP\", \"TROPHS\", \"CK\" in the last 168 hours.    No results for input(s): \"PTP\", \"INR\" in the last 168 hours.               Microbiology    No results found for this visit on 24.      Imaging: Reviewed in Epic.    Medications:    cefepime  1 g Intravenous Q24H    vancomycin  15 mg/kg Intravenous Once    vancomycin  125 mg Oral BID    clopidogrel  75 mg Oral Daily    epoetin rigoberto  10,000 Units Intravenous Once in dialysis    allopurinol  300 mg Oral Daily    atorvastatin  10 mg Oral Nightly    carvedilol  3.125 mg Oral BID with  meals    fenofibrate micronized  134 mg Oral Daily with breakfast    aspirin  81 mg Oral Daily       Assessment & Plan:      #Severe PAD with ulceration  S/p angiogram 3/21/2024, plan for repeat today for R leg  Vascular surgery/Cardiology/podiatry following  Pain control     #ESRD  Renal following  Continue HD, plan for later today after procedure     #CAD s/p cABG  #S/p TAVR  Continue cardiac meds     #CLL  Monitor CBC      Baltazar Ortiz MD    Supplementary Documentation:     Quality:  DVT Mechanical Prophylaxis:        DVT Pharmacologic Prophylaxis   Medication   None         DVT Pharmacologic prophylaxis: Aspirin 81 mg      Code Status: Full Code  Marin: No urinary catheter in place  Marin Duration (in days):   Central line:    WOLFGANG: 3/24/2024    Discharge is dependent on: progress  At this point Ms. Pelletier is expected to be discharge to: home    The 21st Century Cures Act makes medical notes like these available to patients in the interest of transparency. Please be advised this is a medical document. Medical documents are intended to carry relevant information, facts as evident, and the clinical opinion of the practitioner. The medical note is intended as peer to peer communication and may appear blunt or direct. It is written in medical language and may contain abbreviations or verbiage that are unfamiliar.

## 2024-03-22 NOTE — OPERATIVE REPORT
Right SFA PTA 6x200 times 2/ Right SFA PTA DCB 6x220/ then right popliteal, tibial-peroneal trunk, peroneal PTA- 3x80/ 3.5x 80/ then 4x80. 6Fr Angioseal left CFA. Then Left SFA PTA 6x60 Shockwave times 10 of left SFA/ popliteal artery. Then 6x150 DCB-Lutonix times 2/ 6x60 DCB of left SFA/popliteal, Then 2.5x220 left distal popliteal/ Anterior tibial PTA. Then 4CAT Penumbra left AT/ popliteal. Then right CFA 6Fr Angioseal. Surgeon: Azael, 210cc contrastFluoro-55.8/ [id number]- doppler feet post procedure- films reviewed with Moon Valenzuela/ Mary

## 2024-03-22 NOTE — ANESTHESIA PROCEDURE NOTES
Peripheral IV  Date/Time: 3/22/2024 4:37 PM  Inserted by: Del Gonzalez DO    Placement  Needle size: 20 G  Laterality: right  Location: chest.  Local anesthetic: none  Site prep: alcohol  Technique: anatomical landmarks

## 2024-03-22 NOTE — WOUND PROGRESS NOTE
[facility]  Inpatient Wound Care Contact Note    Maggie Pelletier Patient Status:  Outpatient in a Bed    1945 MRN [id number]   Location [facility] CARDIOVASCULAR SURGERY Attending Oleg Addison MD   Hosp Day # 0 PCP LUZ MARIA BLACKBURN     Attempted to see patient for follow up wound care session.  Patient is currently unavailable secondary to a procedure with  per RN.    We will continue to follow this patient while in-house and assist with wound care discharge planning.    Please call me at [phone]  if you have any questions about this consultation and plan of care.        Thank you,    Teresa Abbasi RN BSN [facility]  [address] [address] [phone]  Spectralink: [phone]

## 2024-03-22 NOTE — PLAN OF CARE
Per primary RN-Colette, pt hypotensive throughout the day. Evening dose of Coreg held. RN also stated pt receives midodrine prior to dialysis. Pt unsure of dose she takes. Not on home med list. Advised RN to discuss midodrine with renal.

## 2024-03-22 NOTE — PROGRESS NOTES
INFECTIOUS DISEASE  PROGRESS NOTE            [facility]    Maggie Pelletier Patient Status:  Outpatient in a Bed    1945 MRN [id number]   Location [facility] 2NE-A Attending Oleg Addison MD   Hosp Day # 0 PCP LUZ MARIA BLACKBURN     Antibiotics: #1  Cefepime #0, vanc x1    Subjective:  : drainage blood tinged from right foot incision   No fever    Objective:  Temp:  [97.4 °F (36.3 °C)-98.2 °F (36.8 °C)] 98 °F (36.7 °C)  Pulse:  [76-90] 86  Resp:  [14-16] 14  BP: ()/(32-92) 127/44  SpO2:  [99 %-100 %] 100 %    General: awake alert  Vital signs:Temp:  [97.4 °F (36.3 °C)-98.2 °F (36.8 °C)] 98 °F (36.7 °C)  Pulse:  [76-90] 86  Resp:  [14-16] 14  BP: ()/(32-92) 127/44  SpO2:  [99 %-100 %] 100 %  HEENT: Moist mucous membranes. Extraocular muscles are intact.  Neck: supple no masses  Respiratory: Non labored, symmetric excursion, normal respirations  Cardiovascular: no irregularities in rhythm  Abdomen: Soft, nontender, nondistended.   Musculoskeletal: right foot incision, bloody discharge on gauze  Left foot subcenimeter dry eschar 5th toe  Labs:     COVID-19 Lab Results    COVID-19  Lab Results   Component Value Date    COVID19 Not Detected 2022       Pro-Calcitonin  No results for input(s): \"PCT\" in the last 168 hours.    Cardiac  No results for input(s): \"TROP\", \"PBNP\" in the last 168 hours.    Creatinine Kinase  No results for input(s): \"CK\" in the last 168 hours.    Inflammatory Markers  No results for input(s): \"CRP\", \"ESTHER\", \"LDH\", \"DDIMER\" in the last 168 hours.    Recent Labs   Lab 24  1126   RBC 2.96*   HGB 11.3*   HCT 30.6*   .4*   MCH 38.2*   MCHC 36.9   RDW 16.1   NEPRELIM 2.74   WBC 5.6   .0*         Recent Labs   Lab 24  1126 24  0626   GLU 99 88   BUN 44* 49*   CREATSERUM 4.04* 4.94*   CA 8.4* 8.6    135*   K 3.5 3.6    98   CO2 30.0 26.0           Problem list reviewed:  Patient Active Problem List   Diagnosis    Gangrene  of toe of right foot (HCC)    Ischemic pain of foot, right    PAD (peripheral artery disease) (HCC)    Great toe amputation status, right    Gangrene of right foot (HCC)    ESRD (end stage renal disease) (HCC)    Primary hypertension    Anemia in chronic kidney disease, on chronic dialysis (HCC)    Carotid stenosis, right    Osteomyelitis of right foot (HCC)    Ulcer of right foot, unspecified ulcer stage (HCC)    CLL (chronic lymphocytic leukemia) (HCC)    Status post amputation of lesser toe of right foot (HCC)    Gangrene of toe of left foot (HCC)    Right foot ulcer, with necrosis of bone (HCC)    Coronary artery disease involving native heart without angina pectoris             ASSESSMENT/PLAN:  1. SP Right 2nd MT head resection and closure  Drainage from wound, sent for culture  -send blood cultures and start cefepime, vanc x1 pending micro updates    Vascular workup per Dr. Addison    2. HO Cdiff, prophylactic vancomycin bid  Can decrease to once daily if no loose stools over the weekend    3. ESRD  Chele Crowe MD, MD  [facility]  [phone]

## 2024-03-22 NOTE — PLAN OF CARE
Pt alert and oriented x4. Pt on tele in NSR w/ frequent PVCs. Denies any c/o of chest pain or shortness of breath. Pt on room air. Pt continent of bowel and bladder. Pt anuric, HD. R groin site dressing C/D/I, site soft, no hematoma. BLE ulcers, dressings C/D/I. Pt denies any c/o of pain. Updated pt on plan of care, pt verbalizes understanding. Bed in lowest position and call light within reach.     Plan: R peripheral angiogram  and HD.     1612: Report given to CNICU RN Sarah.     Problem: Patient/Family Goals  Goal: Patient/Family Long Term Goal  Description: Patient's Long Term Goal: Heal foot ulcers    Interventions:  - Peripheral angiogram  - Wound care  - Follow up appointments  - Medications as ordered  - See additional Care Plan goals for specific interventions  Outcome: Progressing  Goal: Patient/Family Short Term Goal  Description: Patient's Short Term Goal: Go home    Interventions:   - Labs, tele, consults  - Peripheral angio tomorrow   - See additional Care Plan goals for specific interventions  Outcome: Progressing     Problem: CARDIOVASCULAR - ADULT  Goal: Maintains optimal cardiac output and hemodynamic stability  Description: INTERVENTIONS:  - Monitor vital signs, rhythm, and trends  - Monitor for bleeding, hypotension and signs of decreased cardiac output  - Evaluate effectiveness of vasoactive medications to optimize hemodynamic stability  - Monitor arterial and/or venous puncture sites for bleeding and/or hematoma  - Assess quality of pulses, skin color and temperature  - Assess for signs of decreased coronary artery perfusion - ex. Angina  - Evaluate fluid balance, assess for edema, trend weights  Outcome: Progressing  Goal: Absence of cardiac arrhythmias or at baseline  Description: INTERVENTIONS:  - Continuous cardiac monitoring, monitor vital signs, obtain 12 lead EKG if indicated  - Evaluate effectiveness of antiarrhythmic and heart rate control medications as ordered  - Initiate emergency  measures for life threatening arrhythmias  - Monitor electrolytes and administer replacement therapy as ordered  Outcome: Progressing     Problem: SAFETY ADULT - FALL  Goal: Free from fall injury  Description: INTERVENTIONS:  - Assess pt frequently for physical needs  - Identify cognitive and physical deficits and behaviors that affect risk of falls.  - Fleming fall precautions as indicated by assessment.  - Educate pt/family on patient safety including physical limitations  - Instruct pt to call for assistance with activity based on assessment  - Modify environment to reduce risk of injury  - Provide assistive devices as appropriate  - Consider OT/PT consult to assist with strengthening/mobility  - Encourage toileting schedule  Outcome: Progressing

## 2024-03-23 LAB
ALBUMIN SERPL-MCNC: 2.8 G/DL (ref 3.4–5)
ALBUMIN/GLOB SERPL: 1 {RATIO} (ref 1–2)
ALP LIVER SERPL-CCNC: 68 U/L
ALT SERPL-CCNC: <6 U/L
ANION GAP SERPL CALC-SCNC: 14 MMOL/L (ref 0–18)
ANTIBODY SCREEN: NEGATIVE
AST SERPL-CCNC: 30 U/L (ref 15–37)
BASOPHILS # BLD AUTO: 0.01 X10(3) UL (ref 0–0.2)
BASOPHILS NFR BLD AUTO: 0.1 %
BILIRUB SERPL-MCNC: 0.6 MG/DL (ref 0.1–2)
BUN BLD-MCNC: 63 MG/DL (ref 9–23)
CALCIUM BLD-MCNC: 8.2 MG/DL (ref 8.5–10.1)
CHLORIDE SERPL-SCNC: 100 MMOL/L (ref 98–112)
CO2 SERPL-SCNC: 23 MMOL/L (ref 21–32)
CREAT BLD-MCNC: 5.96 MG/DL
EGFRCR SERPLBLD CKD-EPI 2021: 7 ML/MIN/1.73M2 (ref 60–?)
EOSINOPHIL # BLD AUTO: 0 X10(3) UL (ref 0–0.7)
EOSINOPHIL NFR BLD AUTO: 0 %
ERYTHROCYTE [DISTWIDTH] IN BLOOD BY AUTOMATED COUNT: 16.1 %
GLOBULIN PLAS-MCNC: 2.8 G/DL (ref 2.8–4.4)
GLUCOSE BLD-MCNC: 111 MG/DL (ref 70–99)
HCT VFR BLD AUTO: 21.2 %
HGB BLD-MCNC: 7.4 G/DL
IMM GRANULOCYTES # BLD AUTO: 0.05 X10(3) UL (ref 0–1)
IMM GRANULOCYTES NFR BLD: 0.6 %
LYMPHOCYTES # BLD AUTO: 0.79 X10(3) UL (ref 1–4)
LYMPHOCYTES NFR BLD AUTO: 9 %
MCH RBC QN AUTO: 37.6 PG (ref 26–34)
MCHC RBC AUTO-ENTMCNC: 34.9 G/DL (ref 31–37)
MCV RBC AUTO: 107.6 FL
MONOCYTES # BLD AUTO: 0.85 X10(3) UL (ref 0.1–1)
MONOCYTES NFR BLD AUTO: 9.7 %
NEUTROPHILS # BLD AUTO: 7.03 X10 (3) UL (ref 1.5–7.7)
NEUTROPHILS # BLD AUTO: 7.03 X10(3) UL (ref 1.5–7.7)
NEUTROPHILS NFR BLD AUTO: 80.6 %
OSMOLALITY SERPL CALC.SUM OF ELEC: 303 MOSM/KG (ref 275–295)
PLATELET # BLD AUTO: 115 10(3)UL (ref 150–450)
POTASSIUM SERPL-SCNC: 4.5 MMOL/L (ref 3.5–5.1)
PROT SERPL-MCNC: 5.6 G/DL (ref 6.4–8.2)
RBC # BLD AUTO: 1.97 X10(6)UL
RH BLOOD TYPE: POSITIVE
SODIUM SERPL-SCNC: 137 MMOL/L (ref 136–145)
WBC # BLD AUTO: 8.7 X10(3) UL (ref 4–11)

## 2024-03-23 PROCEDURE — 30233N1 TRANSFUSION OF NONAUTOLOGOUS RED BLOOD CELLS INTO PERIPHERAL VEIN, PERCUTANEOUS APPROACH: ICD-10-PCS | Performed by: SURGERY

## 2024-03-23 PROCEDURE — 99232 SBSQ HOSP IP/OBS MODERATE 35: CPT | Performed by: INTERNAL MEDICINE

## 2024-03-23 RX ORDER — MIDODRINE HYDROCHLORIDE 5 MG/1
5 TABLET ORAL 3 TIMES DAILY
Status: DISCONTINUED | OUTPATIENT
Start: 2024-03-23 | End: 2024-03-24

## 2024-03-23 RX ORDER — SODIUM CHLORIDE 9 MG/ML
INJECTION, SOLUTION INTRAVENOUS ONCE
Status: COMPLETED | OUTPATIENT
Start: 2024-03-23 | End: 2024-03-23

## 2024-03-23 RX ADMIN — MIDODRINE HYDROCHLORIDE 10 MG: 10 TABLET ORAL at 14:28:00

## 2024-03-23 RX ADMIN — SODIUM CHLORIDE: 9 INJECTION, SOLUTION INTRAVENOUS at 15:49:00

## 2024-03-23 RX ADMIN — ONDANSETRON 4 MG: 2 INJECTION INTRAMUSCULAR; INTRAVENOUS at 01:29:00

## 2024-03-23 RX ADMIN — HYDROCODONE BITARTRATE AND ACETAMINOPHEN 2 TABLET: 5; 325 TABLET ORAL at 00:05:00

## 2024-03-23 RX ADMIN — FENOFIBRATE 134 MG: 134 CAPSULE ORAL at 08:30:00

## 2024-03-23 RX ADMIN — MIDODRINE HYDROCHLORIDE 5 MG: 5 TABLET ORAL at 17:00:00

## 2024-03-23 RX ADMIN — CLOPIDOGREL BISULFATE 75 MG: 75 TABLET ORAL at 08:30:00

## 2024-03-23 RX ADMIN — ATORVASTATIN CALCIUM 20 MG: 20 TABLET, FILM COATED ORAL at 21:07:00

## 2024-03-23 RX ADMIN — HYDROCODONE BITARTRATE AND ACETAMINOPHEN 2 TABLET: 5; 325 TABLET ORAL at 04:45:00

## 2024-03-23 RX ADMIN — ONDANSETRON 4 MG: 2 INJECTION INTRAMUSCULAR; INTRAVENOUS at 19:59:00

## 2024-03-23 RX ADMIN — VANCOMYCIN HYDROCHLORIDE 125 MG: 125 CAPSULE ORAL at 21:07:00

## 2024-03-23 RX ADMIN — ALBUMIN (HUMAN) 25 G: 12.5 SOLUTION INTRAVENOUS at 17:52:00

## 2024-03-23 RX ADMIN — ALLOPURINOL 300 MG: 300 TABLET ORAL at 08:30:00

## 2024-03-23 RX ADMIN — HYDROCODONE BITARTRATE AND ACETAMINOPHEN 1 TABLET: 5; 325 TABLET ORAL at 16:05:00

## 2024-03-23 NOTE — PLAN OF CARE
Assumed care of pt @ 1930.  Rec'd pt in bed, resting.  Bilateral groin sites c/d/I, no hematoma noted.  Sites tender to touch.  Levophed weaned off.  Dr. Addison notified of Co2 results per lab, no further orders.  Bilateral PT/DP pulses per doppler.

## 2024-03-23 NOTE — PROGRESS NOTES
[facility]   part of [facility]     Hospitalist Progress Note     Maggie Pelletier Patient Status:  Outpatient in a Bed    1945 MRN [id number]   Location [facility] 2NE-A Attending Oleg Addison MD   Hosp Day # 1 PCP LUZ MARIA BLACKBURN     Chief Complaint: med management    Subjective:     Patient without acute events overnith. Had angio yesterday and tolerated well. No pain. No CP or SOB. Needed low dose levophed given labile BP.     Objective:    Review of Systems:   A comprehensive review of systems was completed; pertinent positive and negatives stated in subjective.    Vital signs:  Temp:  [97.7 °F (36.5 °C)-98.9 °F (37.2 °C)] 98.2 °F (36.8 °C)  Pulse:  [] 101  Resp:  [11-33] 22  BP: ()/(14-80) 97/35  SpO2:  [92 %-100 %] 94 %    Physical Exam:    General: No acute distress  Respiratory: No wheezes, no rhonchi  Cardiovascular: S1, S2, regular rate and rhythm  Abdomen: Soft, Non-tender, non-distended, positive bowel sounds  Neuro: No new focal deficits.   Extremities: No edema, feet bandaged      Diagnostic Data:    Labs:  Recent Labs   Lab 24  1126 24  0440   WBC 5.6 6.6 8.7   HGB 11.3* 8.6* 7.4*   .4* 105.3* 107.6*   .0* 134.0* 115.0*   BAND  --  1  --    INR  --  1.53*  --        Recent Labs   Lab 24  0626 24  0440   GLU 88 125* 111*   BUN 49* 56* 63*   CREATSERUM 4.94* 5.37* 5.96*   CA 8.6 8.3* 8.2*   ALB  --   --  2.8*   * 136 137   K 3.6 4.0 4.5   CL 98 99 100   CO2 26.0 23.0 23.0   ALKPHO  --   --  68   AST  --   --  30   ALT  --   --  <6*   BILT  --   --  0.6   TP  --   --  5.6*       Estimated Creatinine Clearance: 5.6 mL/min (A) (based on SCr of 5.96 mg/dL (H)).    No results for input(s): \"TROP\", \"TROPHS\", \"CK\" in the last 168 hours.    Recent Labs   Lab 24   PTP 18.5*   INR 1.53*                  Microbiology    Hospital Encounter on 24   1. Aerobic Bacterial Culture      Status: None (Preliminary result)    Collection Time: 03/22/24 10:50 AM    Specimen: Foot, right; Other   Result Value Ref Range    Aerobic Smear 3+ Gram positive cocci in pairs N/A    Aerobic Smear 3+ Gram Positive Rods N/A    Aerobic Smear No WBCs seen N/A         Imaging: Reviewed in Epic.    Medications:    sodium chloride   Intravenous Once    cefepime  1 g Intravenous Q24H    vancomycin  125 mg Oral BID    clopidogrel  75 mg Oral Daily    atorvastatin  20 mg Oral Nightly    carvedilol  3.125 mg Oral BID with meals    apixaban  2.5 mg Oral BID    epoetin rigoberto  10,000 Units Intravenous Once in dialysis    allopurinol  300 mg Oral Daily    fenofibrate micronized  134 mg Oral Daily with breakfast       Assessment & Plan:      #Severe PAD with ulceration  S/p angiogram 3/21/2024  S/p extensive PCI to bilateral lower extremities on 3/22  Vascular surgery/Cardiology/podiatry following  Pain control     #ESRD  Renal following  Continue HD, plan for later today after procedure     #CAD s/p cABG  #S/p TAVR  Continue cardiac meds  Low dose midodrine  Monitor BP closely  Wean levophed as tolerated     #CLL  Monitor CBC      Baltazar Ortiz MD    Supplementary Documentation:     Quality:  DVT Mechanical Prophylaxis:        DVT Pharmacologic Prophylaxis   Medication    apixaban (Eliquis) tab 2.5 mg                Code Status: Full Code  Marin: No urinary catheter in place  Marin Duration (in days):   Central line:    WOLFGANG: 3/24/2024    Discharge is dependent on: progress  At this point Ms. Pelletier is expected to be discharge to: home    The 21st Century Cures Act makes medical notes like these available to patients in the interest of transparency. Please be advised this is a medical document. Medical documents are intended to carry relevant information, facts as evident, and the clinical opinion of the practitioner. The medical note is intended as peer to peer communication and may appear blunt or direct. It is written in  medical language and may contain abbreviations or verbiage that are unfamiliar.

## 2024-03-23 NOTE — PROGRESS NOTES
[facility]   part of [facility]     Nephrology Progress Note    Maggie Pelletier Patient Status:  Inpatient    1945 MRN [id number]   Location [facility] 6NE-A Attending Oleg Addison MD   Hosp Day # 1 PCP LUZ MARIA BLACKBURN       SUBJECTIVE:  BP remains marginal, on levo at 2 mcg this AM.  HD held last PM        Physical Exam:   /41   Pulse 92   Temp 97.7 °F (36.5 °C) (Temporal)   Resp 15   Wt 152 lb 5.4 oz (69.1 kg)   SpO2 100%   BMI 29.75 kg/m²   Temp (24hrs), Av.2 °F (36.8 °C), Min:97.7 °F (36.5 °C), Max:98.9 °F (37.2 °C)       Intake/Output Summary (Last 24 hours) at 3/23/2024 0840  Last data filed at 3/23/2024 0500  Gross per 24 hour   Intake 2023 ml   Output 200 ml   Net 1823 ml     Last 3 Weights   24 0500 152 lb 5.4 oz (69.1 kg)   24 0802 150 lb 5.7 oz (68.2 kg)   24 0912 141 lb 9.6 oz (64.2 kg)   24 1539 145 lb (65.8 kg)   02/15/24 0327 139 lb 12.4 oz (63.4 kg)   24 1724 147 lb 11.3 oz (67 kg)   24 1534 147 lb 11.3 oz (67 kg)   24 1512 145 lb (65.8 kg)   10/04/23 0600 144 lb (65.3 kg)   23 0040 138 lb (62.6 kg)   23 1826 138 lb (62.6 kg)   23 1502 138 lb (62.6 kg)     General: Alert and oriented in no apparent distress.  HEENT: No scleral icterus, MMM  Neck: Supple, no JOE or thyromegaly  Cardiac: Regular rate and rhythm, S1, S2 normal, no murmur or rub  Lungs: Clear without wheezes, rales, rhonchi.    Abdomen: Soft, non-tender. + bowel sounds, no palpable organomegaly  Extremities: Without clubbing, cyanosis or edema. L arm AVF with bruit/thrill  Neurologic:  moving all extremities  Skin: Warm and dry, no rash        Labs:     Recent Labs   Lab 24  0440   WBC 5.6 6.6 8.7   HGB 11.3* 8.6* 7.4*   .4* 105.3* 107.6*   .0* 134.0* 115.0*   BAND  --  1  --    INR  --  1.53*  --        Recent Labs   Lab 24  11224  0626 24  0440   NA  137 135* 136 137   K 3.5 3.6 4.0 4.5    98 99 100   CO2 30.0 26.0 23.0 23.0   BUN 44* 49* 56* 63*   CREATSERUM 4.04* 4.94* 5.37* 5.96*   CA 8.4* 8.6 8.3* 8.2*   MG  --  2.0  --   --    GLU 99 88 125* 111*       Recent Labs   Lab 03/23/24  0440   ALT <6*   AST 30   ALB 2.8*       No results for input(s): \"PGLU\" in the last 168 hours.    Meds:   Current Facility-Administered Medications   Medication Dose Route Frequency    sodium chloride 0.9% infusion   Intravenous Once    midodrine (ProAmatine) tab 10 mg  10 mg Oral PRN Dialysis    ceFEPIme (Maxipime) 1 g in sodium chloride 0.9% 100 mL IVPB-MBP  1 g Intravenous Q24H    vancomycin (Vancocin) cap 125 mg  125 mg Oral BID    lactated ringers infusion   Intravenous Continuous    ondansetron (Zofran) 4 MG/2ML injection 4 mg  4 mg Intravenous Q6H PRN    iodixanol (VISIPAQUE) 320 MG/ML injection 100 mL  100 mL Injection ONCE PRN    acetaminophen (Tylenol) tab 650 mg  650 mg Oral Q4H PRN    Or    HYDROcodone-acetaminophen (Norco) 5-325 MG per tab 1 tablet  1 tablet Oral Q4H PRN    Or    HYDROcodone-acetaminophen (Norco) 5-325 MG per tab 2 tablet  2 tablet Oral Q4H PRN    clopidogrel (Plavix) tab 75 mg  75 mg Oral Daily    atorvastatin (Lipitor) tab 20 mg  20 mg Oral Nightly    carvedilol (Coreg) tab 3.125 mg  3.125 mg Oral BID with meals    apixaban (Eliquis) tab 2.5 mg  2.5 mg Oral BID    norepinephrine (Levophed) 4 mg/250mL infusion premix  0.5-30 mcg/min Intravenous Continuous    sodium chloride 0.9 % IV bolus 100 mL  100 mL Intravenous Q30 Min PRN    And    albumin human (Albumin) 25% injection 25 g  25 g Intravenous PRN Dialysis    sodium chloride 0.9% infusion   Intravenous Continuous    lidocaine-prilocaine (Emla) 2.5-2.5 % cream   Topical PRN    epoetin rigoberto (Epogen, Procrit) 74159 UNIT/ML injection 10,000 Units  10,000 Units Intravenous Once in dialysis    allopurinol (Zyloprim) tab 300 mg  300 mg Oral Daily    fenofibrate micronized (Lofibra) cap 134 mg  134  mg Oral Daily with breakfast         Impression/Plan:        ESRD- due to HTN.  Typically on HD MWF but held yest due to hypotension.  HD today then again on Monday  PAD- s/p complex intervention.   Per vascular  Anemia- due to ESRD with sig post-op decline.  Transfuse with HD today.  Cont  CHAVEZ w/ HD    Questions/concerns were discussed with patient and/or family by bedside.          Steve Howell MD  3/23/2024  8:40 AM

## 2024-03-23 NOTE — OPERATIVE REPORT
[facility]    PATIENT'S NAME: JEFFREY FLORES   ATTENDING PHYSICIAN: Oleg Addison M.D.   OPERATING PHYSICIAN: Oleg Addison M.D.   PATIENT ACCOUNT#:   [id number]    LOCATION:  [address] [place] [address]  MEDICAL RECORD #:   [id number]       YOB: 1945  ADMISSION DATE:       03/21/2024      OPERATION DATE:  03/22/2024    OPERATIVE REPORT      PREOPERATIVE DIAGNOSIS:  Gangrene of both the right and left leg.  POSTOPERATIVE DIAGNOSIS:  Gangrene of both the right and left leg, bilateral superficial femoral artery, bilateral tibial vessel occlusive disease.  PROCEDURE:    1.   Duplex ultrasound access, left common femoral artery with micropuncture technique with evaluation of at least a greater than 50% stenosis, calcified plaque of the proximal left superficial femoral artery.  2.   Retrograde angiogram of the left common femoral artery, confirming it was in the femoral artery.  3.   Placement of an eventual 6-Mauritian Deuce sheath over a stiff wire into the right superficial femoral artery.  4.   End-hole catheter angiogram of the right superficial femoral artery, popliteal artery, peroneal artery, and plantar arch via the 6-Mauritian Duece sheath.    5.   Balloon angioplasty with a 6 x 200 mm balloon angioplasty of the right superficial femoral artery x2.  The patient had a recent 2-month-old Shockwave balloon angioplasty.  6.   Drug-coated balloon angioplasty Lutonix of the right superficial femoral artery, 6 x 150 x1.    7.   Balloon angioplasty with a 3 x 80 mm balloon of the right popliteal artery, tibioperoneal trunk, and proximal peroneal artery, followed by a 3.5 x 80 mm balloon of the same distal popliteal, tibioperoneal trunk, and peroneal artery.  8.   Closure of the left common femoral artery puncture site with a 6-[place]auritian Angio-Seal.  9.   Micropuncture ultrasound access of the right common femoral artery with micropuncture technique with a retrograde angiogram of the right common femoral  artery.  10.   Placement of a 6-[place] Deuce sheath into the left common femoral artery with end-hole angiogram of the left common femoral artery, superficial femoral artery, popliteal artery, tibial vessels.  11.   Placement of a 0.018 Gladius guidewire into the area of the popliteal artery, exchanged to a 0.014 Runthrough guidewire with a Shockwave balloon atherectomy/ angioplasty x10 of a 6 x 60 mm balloon of the left superficial femoral artery and the popliteal artery with severe plaque calcified stenosis greater than 50% in multiple areas.    12.   Drug-coated balloon angioplasty Lutonix 6 x 220, followed by 6 x 150 proximal and 6 x 150 distally.  13.   [place] balloon angioplasty, left popliteal artery, 6 x 40 prior to placement of Shockwave balloon angioplasty x1.  14.   Repeat angiogram showing possible occlusion or embolization of the anterior tibial artery, treated with a 2.5 x 220 mm balloon angioplasty of the left anterior tibial artery and distal popliteal artery.    15.   A 4 CAT Penumbra catheter thrombectomy  suction catheter  of the left anterior tibial artery popliteal artery.  16.   Repeat angiogram from an end-hole catheter of the left common femoral artery, profunda femoral artery, superficial femoral artery, popliteal artery, anterior tibial artery going down to an incomplete plantar arch with collaterals going from the distal anterior tibial artery into the foot.    17.   Closure of the right common femoral artery with a 6-Urugu[place] Angio-Seal.    INDICATIONS:  This is a 78-year-old white female referred by Dr. Oleg Grady, interventional cardiologist, [facility] for gangrene of both lower legs.  She had a previous angioplasty of the right leg years ago at [facility] and had had a procedure done by me in February 2024 of the right leg and seemed to have improved flow.  She however developed a little worsening gangrene of the right second toe which had been amputated and then  developed new gangrene of the left fifth toe and the tip of the third toe.  She had an angiogram done yesterday showing the left superficial femoral artery was open, and appeared to be multiple areas of stenosis, but appeared to be about 50% and only runoff vessel is anterior tibial artery, but it ended above the ankle with collaterals going into the peroneal artery going into the foot.  At that time, it was elected not to do anything with it.  However, when ultrasound was done today for fixing the right leg, it was noticed that the ultrasound picked up plaque in the left proximal superficial femoral artery that was over 50% stenosis.  It was therefore decided to fix both legs today.  Discussed on the phone with the patient's daughter, Heather, with the risks and complications explained for both procedures.  She agreed for her mother who was under general anesthesia, who had also wanted the procedure done before.  The risks of death, cardiac complications, bleeding, infection, limb loss, graft thrombosis, future limb loss, future graft thrombosis, recurrent stenosis and inability to heal the wound, possible need for future bypass surgery were all explained.  Risks and complication of not doing the procedure were explained.  The patient understood and agreed.  All questions answered.    OPERATIVE TECHNIQUE:  Patient was placed supine on procedure table in the hybrid room, underwent general anesthesia.  Arterial lines and IVs started by Anesthesia.  She is on dialysis Monday, Wednesday, and Friday.  She had the lower abdomen, both groins and thighs prepped and draped in usual sterile technique.  Ioban Vi-Drape was used to cover the operative field.  She received preoperative antibiotics.    After time-out was done, duplex ultrasound was done of the left common femoral artery to go for the right leg.  It was noted at this time that there was significant plaque disease proximally to superficial femoral artery.  That is  why, I made a phone conversation with the patient's daughter, decided to redo the left leg and treat whatever stenosis was there.  She was agreeable with the risks and complications explained.    Proceeded with the angiogram of the right leg.  Imaging was done.  The films were reviewed with Dr. Valenzuela and he thought there was significant stenosis diffusely across superficial femoral artery and recommended this being treated.  During the procedure, the patient had a balloon angioplasty with a [place] balloon 6 x 220 mm in 2 areas.  The entire length was treated and then a drug-coated balloon Lutonix was done of 6 x 220 which covered basically the whole length.  At the completion of this, while the balloon had been placed down, there was a small trauma of the area of the tibioperoneal trunk going into the vein and a small fistula created.  This was treated then with a 3 x 80 mm balloon angioplasty of the popliteal artery, tibioperoneal trunk and peroneal artery, which had been opened previously and then with another 3.5 x 80 mm balloon angioplasty in similar area and then a 4 x 80 mm balloon angioplasty at the very distal tibioperoneal trunk, proximal peroneal artery going to the popliteal artery.    This fistula still persisted.  Discussed with Dr. Valenzuela, and we decided to leave this alone.    Attention was directed to the left side as the catheter from the left femoral artery was removed with the use of an Angio-Seal with good hemostasis obtained.  The right side was punctured with a micropuncture technique.  With duplex ultrasound imaging, placement of a micropuncture sheath with retrograde angiograms confirming the proper position of the common femoral artery.  Both were accessed without areas of significant calcification.  Eventually, a 6-Nigerian Deuce sheath was placed into the proximal common femoral artery.  Repeat angiogram was performed from yesterday showing disease of the proximal superficial femoral  artery.  It took a while but eventually a Gladius with a 0.018 Quick-Cross was able to cross this area.  It was advanced down into the popliteal artery.  This was then switched over to a 0.014 Runthrough guidewire and then over this, a Shockwave balloon angioplasty 6 x 60 was done of the proximal superficial femoral artery to mid and distal superficial femoral artery as well as proximal popliteal artery, 6 x 60 x10 angioplasties.    A 6 x 40 mm [place] balloon angioplasty was also done in a very distal superficial femoral artery, proximal popliteal artery to make sure the balloons were able to get down.    The patient then had 3 drug-coated balloons placed first in the left superficial femoral artery to main portion of 6 x 150 and then 6 x 150 distally and then 6 x 60 proximally.  After repeat angiogram was performed, the dye went down the leg slowly probably from a cardiomyopathy.  The patient was given 600 mcg of a nitroglycerin.  Still there was problem and the flow was slow and somewhat not filling completely the anterior tibial artery on the left side, which was the main runoff vessel.    The patient then had 0.014 Runthrough was down at the area of the ankle and went down to the area of the ankle under fluoroscopy and a 2.5 x 220 mm balloon angioplasty was done of the entire length.  At completion with this, a 4 CAT Penumbra thrombectomy suction device was placed on the wire to the anterior tibial artery, was gradually pulled back on dissection to pull any thrombus or small debris that may have gone down.    Repeat angiogram performed now showed a widely patent left common femoral artery, superficial femoral artery, profunda femoral artery still patent.  The popliteal artery, the anterior tibial artery going all the way down to the very distal end and into the collaterals going to the peroneal artery which went into an incomplete plantar arch.  Doppler flow was appreciated at the end of procedure, both at the  distal anterior tibial artery as well as the right posterior tibial artery.    Once this was done, the patient then had the sheaths removed and a 6-Sri Lankan Angio-Seal was used to close the right groin.    It should be noted the patient was fully anticoagulated throughout the procedure once the sheaths were initially placed and ACT was kept over 250 seconds.  The ACT was monitored throughout the procedure.  The patient's vital signs remained relatively stable throughout the procedure, although she did drift down slightly with the nitroglycerin and was put on a small amount of Levophed until the blood pressure came back up again.  The blood loss maybe 100 mL to 125 mL.  The patient received 215 mL of contrast.  The sponges and instrument counts were correct.  The patient had a fluoro time of 55.8 minutes with a DAP of 05761 and the total contrast was 210 mL.  At the end of the case, Dr. Hernandez came in also to review the films.    Dictated By Oleg Addison M.D.  d: 03/22/2024 17:25:34  t: 03/23/2024 03:06:02  Job [id number]  JJW/    cc: DILLAN Angelo MD

## 2024-03-23 NOTE — PROGRESS NOTES
INFECTIOUS DISEASE  PROGRESS NOTE            [facility]    Maggie Pelletier Patient Status:  Outpatient in a Bed    1945 MRN [id number]   [facility] 2NE-A Attending Oleg Addison MD   Hosp Day # 1 PCP LUZ MARIA BLACKBURN     Antibiotics: #3  Cefepime #2, vanc x1    Subjective:  Patient denies R foot pain. Bloody drainage small amount noted on dressing. She refused PO Vanco this morning, reports that she was afraid it was an antibiotic she was given in December as an outpatient that caused profuse vomiting. We discussed role of po vanco and its importance and she is now agreeable. No shortness of breath, cough, sore throat, congestion, abdominal pain, dysuria. Having formed bowel movements. S/p angio with intervention of BLE yesterday. Afebrile. WBC 8k.     Objective:  Temp:  [97.7 °F (36.5 °C)-98.9 °F (37.2 °C)] 98 °F (36.7 °C)  Pulse:  [] 106  Resp:  [11-33] 20  BP: ()/(14-80) 93/31  SpO2:  [92 %-100 %] 98 %    General: awake alert  Vital signs:Temp:  [97.7 °F (36.5 °C)-98.9 °F (37.2 °C)] 98 °F (36.7 °C)  Pulse:  [] 106  Resp:  [11-33] 20  BP: ()/(14-80) 93/31  SpO2:  [92 %-100 %] 98 %  HEENT: Moist mucous membranes. Extraocular muscles are intact.  Neck: supple no masses  Respiratory: Non labored, symmetric excursion, normal respirations  Cardiovascular: no irregularities in rhythm  Abdomen: Soft, nontender, nondistended.   Musculoskeletal: right foot incision, bloody discharge on gauze, see photo below  Left foot subcenimeter dry eschar 5th toe              Labs:     COVID-19 Lab Results    COVID-19  Lab Results   Component Value Date    COVID19 Not Detected 2022       Pro-Calcitonin  No results for input(s): \"PCT\" in the last 168 hours.    Cardiac  No results for input(s): \"TROP\", \"PBNP\" in the last 168 hours.    Creatinine Kinase  No results for input(s): \"CK\" in the last 168 hours.    Inflammatory Markers  No results for input(s): \"CRP\", \"ESTHER\",  \"LDH\", \"DDIMER\" in the last 168 hours.    Recent Labs   Lab 03/22/24 1954 03/23/24  0440   RBC 2.25* 1.97*   HGB 8.6* 7.4*   HCT 23.7* 21.2*   .3* 107.6*   MCH 38.2* 37.6*   MCHC 36.3 34.9   RDW 15.7 16.1   NEPRELIM 5.84 7.03   WBC 6.6 8.7   .0* 115.0*   NEUT 89  --    LYMPH 7  --    MON 3  --    EOS 0  --          Recent Labs   Lab 03/22/24 0626 03/22/24 1954 03/23/24  0440   GLU 88 125* 111*   BUN 49* 56* 63*   CREATSERUM 4.94* 5.37* 5.96*   CA 8.6 8.3* 8.2*   ALB  --   --  2.8*   * 136 137   K 3.6 4.0 4.5   CL 98 99 100   CO2 26.0 23.0 23.0   ALKPHO  --   --  68   AST  --   --  30   ALT  --   --  <6*   BILT  --   --  0.6   TP  --   --  5.6*           Problem list reviewed:  Patient Active Problem List   Diagnosis    Gangrene of toe of right foot (HCC)    Ischemic pain of foot, right    PAD (peripheral artery disease) (HCC)    Great toe amputation status, right    Gangrene of right foot (HCC)    ESRD (end stage renal disease) (HCC)    Primary hypertension    Anemia in ESRD (end-stage renal disease) (HCC)    Carotid stenosis, right    Osteomyelitis of right foot (HCC)    Ulcer of right foot, unspecified ulcer stage (HCC)    CLL (chronic lymphocytic leukemia) (HCC)    Status post amputation of lesser toe of right foot (HCC)    Gangrene of toe of left foot (HCC)    Right foot ulcer, with necrosis of bone (HCC)    Coronary artery disease involving native heart without angina pectoris             ASSESSMENT/PLAN:  1. SP Right 2nd MT head resection and closure  Drainage from wound, sent for culture, now growing Morganella morganii and Enterococcus faecalis, sensitivities pending   Blood cultures 3/22 -Ngx1 day  Received vanco x 1 on 3/22  Cefepime initiated 3/22    Patient's second line removed this AM, now only with one line which Levo is running through - not compatible with cefepime. Will switch to ceftriaxone to see if this can work, otherwise patient will need another line for access  (reportedly refused this morning)  -monitor clinically   -follow up blood cultures  -follow up wound cx, sensitivities     2. HO Cdiff, prophylactic vancomycin bid  Can decrease to once daily on Monday if no loose stools over the weekend    -formed stools thus far, CTM  -of note patient refused AM dose today, agreeable after our conversation to take moving forward to prevent c diff while on antibiotics     3. ESRD    [facility]     MID ID ATTENDING ADDENDUM:    Chart reviewed.  Agree with BENJAMIN note.  Continue present antimicrobials. More than 50% of clinical time and 100% of  MDM was performed by me.

## 2024-03-23 NOTE — PLAN OF CARE
Received from operating room awake and alert. Pulse per doppler.  Bilateral groin sites soft, painful to palpation.  Bp 70's albumin per Dr. Howell. Dr. Addison at bedside orders for levophed and 500 ml bolus.  Bp cuff moved to right lower arm.  Discussed with daughter on phone.  Problem: CARDIOVASCULAR - ADULT  Goal: Maintains optimal cardiac output and hemodynamic stability  Description: INTERVENTIONS:  - Monitor vital signs, rhythm, and trends  - Monitor for bleeding, hypotension and signs of decreased cardiac output  - Evaluate effectiveness of vasoactive medications to optimize hemodynamic stability  - Monitor arterial and/or venous puncture sites for bleeding and/or hematoma  - Assess quality of pulses, skin color and temperature  - Assess for signs of decreased coronary artery perfusion - ex. Angina  - Evaluate fluid balance, assess for edema, trend weights  Outcome: Progressing  Goal: Absence of cardiac arrhythmias or at baseline  Description: INTERVENTIONS:  - Continuous cardiac monitoring, monitor vital signs, obtain 12 lead EKG if indicated  - Evaluate effectiveness of antiarrhythmic and heart rate control medications as ordered  - Initiate emergency measures for life threatening arrhythmias  - Monitor electrolytes and administer replacement therapy as ordered  Outcome: Progressing

## 2024-03-23 NOTE — PROGRESS NOTES
Cardiology Progress Note    Maggie Pelletier Patient Status:  Inpatient    1945 MRN [id number]   Location [facility] 6NE-A Attending Oleg Addison MD   Hosp Day # 1 PCP LUZ MARIA BLACKBURN       Subjective:     Patient seen and examined this morning.  Resting in recliner.  Eager to go home.  Denies any complaints.  States her blood pressure is always low and she feels as if this is her baseline.       Objective:   Temp: 97.7 °F (36.5 °C)  Pulse: 92  Resp: 15  BP: 102/41    Intake/Output:     Intake/Output Summary (Last 24 hours) at 3/23/2024 0822  Last data filed at 3/23/2024 0500  Gross per 24 hour   Intake 2023 ml   Output 200 ml   Net 1823 ml       Last 3 Weights   24 0500 152 lb 5.4 oz (69.1 kg)   24 0802 150 lb 5.7 oz (68.2 kg)   24 0912 141 lb 9.6 oz (64.2 kg)   24 1539 145 lb (65.8 kg)   02/15/24 0327 139 lb 12.4 oz (63.4 kg)   24 1724 147 lb 11.3 oz (67 kg)   24 1534 147 lb 11.3 oz (67 kg)   24 1512 145 lb (65.8 kg)       Tele: NSR     Physical Exam:     General: Alert and oriented x 3. No apparent distress. No respiratory or constitutional distress.  HEENT: Normocephalic, anicteric sclera, neck supple.  Neck: No JVD  Cardiac: Regular rate and rhythm. S1, S2 normal. No murmur, pericardial rub, S3.  Lungs: Clear anteriorly.  Abdomen: Soft, non-tender.   Extremities: No edema.  Wound dressing to right foot.  Neurologic: Alert and oriented, normal affect.  Skin: Warm and dry.     Laboratory/Data:    Labs:         Recent Labs   Lab 24  1126 24  19524  0440   WBC 5.6 6.6 8.7   HGB 11.3* 8.6* 7.4*   .4* 105.3* 107.6*   .0* 134.0* 115.0*   BAND  --  1  --    INR  --  1.53*  --        Recent Labs   Lab 24  1126 24  0626 24  0440    135* 136 137   K 3.5 3.6 4.0 4.5    98 99 100   CO2 30.0 26.0 23.0 23.0   BUN 44* 49* 56* 63*   CREATSERUM 4.04* 4.94* 5.37* 5.96*   CA 8.4* 8.6 8.3* 8.2*    MG  --  2.0  --   --    GLU 99 88 125* 111*       Recent Labs   Lab 03/23/24  0440   ALT <6*   AST 30   ALB 2.8*       No results for input(s): \"TROP\" in the last 168 hours.    Allergies:   Allergies   Allergen Reactions    Epinephrine OTHER (SEE COMMENTS)     Dental epi. Pain for 2 weeks after dental procedure.       Medications:  Current Facility-Administered Medications   Medication Dose Route Frequency    sodium chloride 0.9% infusion   Intravenous Once    midodrine (ProAmatine) tab 10 mg  10 mg Oral PRN Dialysis    ceFEPIme (Maxipime) 1 g in sodium chloride 0.9% 100 mL IVPB-MBP  1 g Intravenous Q24H    vancomycin (Vancocin) cap 125 mg  125 mg Oral BID    lactated ringers infusion   Intravenous Continuous    ondansetron (Zofran) 4 MG/2ML injection 4 mg  4 mg Intravenous Q6H PRN    iodixanol (VISIPAQUE) 320 MG/ML injection 100 mL  100 mL Injection ONCE PRN    acetaminophen (Tylenol) tab 650 mg  650 mg Oral Q4H PRN    Or    HYDROcodone-acetaminophen (Norco) 5-325 MG per tab 1 tablet  1 tablet Oral Q4H PRN    Or    HYDROcodone-acetaminophen (Norco) 5-325 MG per tab 2 tablet  2 tablet Oral Q4H PRN    clopidogrel (Plavix) tab 75 mg  75 mg Oral Daily    atorvastatin (Lipitor) tab 20 mg  20 mg Oral Nightly    carvedilol (Coreg) tab 3.125 mg  3.125 mg Oral BID with meals    apixaban (Eliquis) tab 2.5 mg  2.5 mg Oral BID    norepinephrine (Levophed) 4 mg/250mL infusion premix  0.5-30 mcg/min Intravenous Continuous    sodium chloride 0.9 % IV bolus 100 mL  100 mL Intravenous Q30 Min PRN    And    albumin human (Albumin) 25% injection 25 g  25 g Intravenous PRN Dialysis    sodium chloride 0.9% infusion   Intravenous Continuous    lidocaine-prilocaine (Emla) 2.5-2.5 % cream   Topical PRN    epoetin rigoberto (Epogen, Procrit) 42131 UNIT/ML injection 10,000 Units  10,000 Units Intravenous Once in dialysis    allopurinol (Zyloprim) tab 300 mg  300 mg Oral Daily    fenofibrate micronized (Lofibra) cap 134 mg  134 mg Oral Daily  with breakfast         Assessment:  Severe PVD - w/ new left fifth toe ulceration   S/p 2/24 right second metatarsal head resection and excision of right foot ulcerations with primary closure  S/p 2/13/24 angioplasty of the right peroneal artery, tibioperoneal trunk, R FSA per Dr. Addison   S/p 3/22/2024 -extensive PCI to bilateral lower extremities including R SFA, R pop, R TP trunk, R prox peroneal, L SFA, L pop, L AT  CAD, remote CABG  On ASA, plavix, BB, statin   Hx of CEA  TAVR 2022  Preserved LVEF  Mean systolic gradient 13 mmhg  Moderate to severe MR, PASP 55 mmHg  ESRD on HD  CLL  Chronic thrombocytopenia        Plan:  Labile blood pressure post procedure.  On low-dose Levophed.  Resume p.o. midodrine.  Monitor patient during dialysis regarding blood pressure control.  Continue Eliquis 2.5 mg twice daily, Plavix 75 mg daily, atorvastatin 20 mg daily  Further recommendations vascular surgery.      Benson Mi DO  Cardiologist  [facility]  3/23/2024 8:22 AM        Note to the patient: The 21st Century Cures Act makes medical notes like these available to patients in the interest of transparency. However, be advised that this is a medical document. It is intended as peer to peer communication. It is written in medical language and may contain abbreviations or verbiage that are unfamiliar. It may appear blunt or direct. Medical documents are intended to carry relevant information, facts as evident, and clinical opinion of the practitioner.     Disclaimer: Components of this note were documented using voice recognition system and are subject to errors not corrected at proofreading. Contact the author of this note for any clarifications.

## 2024-03-23 NOTE — PLAN OF CARE
Received patient up in chair. VSS on RA. On levophed to keep SBP >90, see MAR. Midodrine, albumin and 1 unit PRBCs given during dialysis. Anuric which is pts. Baseline. Bilateral groin sites C/D/I, soft without hematoma and pulses per doppler. Daughter at bedside, updated on plan of care.         14:22: Dr. Addison and Dr. Howell notified of Low blood pressure readings, despite increasing levophed. Midodrine PRN given per Dr. Addison. Scheduled Midodrine ordered per Dr. Howell. Patient asymptomatic throughout any reading.

## 2024-03-23 NOTE — CONSULTS
[facility]    PATIENT'S NAME: JEFFREY FLORES   ATTENDING PHYSICIAN: Oleg Addison M.D.   CONSULTING PHYSICIAN: Oleg Addison M.D.   PATIENT ACCOUNT#:   [id number]    LOCATION:  [address] [place] [address]  MEDICAL RECORD #:   [id number]       YOB: 1945  ADMISSION DATE:       03/21/2024      CONSULT DATE:  03/23/2024    REPORT OF CONSULTATION    PROGRESS NOTE    Patient is overall doing well.  Neurologically, she is intact.  She wants to go home; however, she is still on a small amount of Levophed.  Hemoglobin has drifted down to 7.4.  Discussed with Dr. Howell and will give her 1 unit of blood.  She is being dialyzed today.  The patient has no chest pain or shortness of breath.  Both legs are open with Doppler flow appreciated in the distal left anterior tibial artery and the right posterior tibial artery.  She is aware of the bilateral plantar artery disease.  Her films were reviewed yesterday by Dr. Valenzuela and Dr. Hernandez.  At this point, we will just hold off and not do anything further.  Would not do any foot surgery at this time, but I have recommended waiting at least a week or two to make sure everything heals up.  She has an incidental fistula on the right side with the distal tibial/peroneal trunk.  Otherwise, she is doing fine.  Her main runoff on the right side is the peroneal artery.  The main runoff on the left side is the anterior tibial artery; however, the anterior tibial artery does not go directly to the dorsal pedal artery, but rather collaterals from the peroneal artery going into the foot.  The patient had all questions answered.  I have also discussed with her daughters.  Will keep her in the unit for today and continue to observe and, hopefully, will be able to go home soon, but would like to have a little bit more stability before she leaves.    Dictated By Oleg Addison M.D.  d: 03/23/2024 11:07:43  t: 03/23/2024 13:28:52  Job [id number]  NIVIA/    cc: Oleg  MARTI Addison M.D.

## 2024-03-24 ENCOUNTER — APPOINTMENT (OUTPATIENT)
Dept: ULTRASOUND IMAGING | Facility: HOSPITAL | Age: 79
End: 2024-03-24
Attending: SURGERY
Payer: MEDICARE

## 2024-03-24 VITALS
DIASTOLIC BLOOD PRESSURE: 41 MMHG | BODY MASS INDEX: 28 KG/M2 | TEMPERATURE: 98 F | OXYGEN SATURATION: 99 % | SYSTOLIC BLOOD PRESSURE: 93 MMHG | WEIGHT: 141.5 LBS | RESPIRATION RATE: 16 BRPM | HEART RATE: 87 BPM

## 2024-03-24 PROBLEM — I95.1 ORTHOSTATIC HYPOTENSION: Status: ACTIVE | Noted: 2024-01-01

## 2024-03-24 PROBLEM — I95.1 ORTHOSTATIC HYPOTENSION: Status: ACTIVE | Noted: 2024-03-24

## 2024-03-24 LAB
ATRIAL RATE: 192 BPM
BLOOD TYPE BARCODE: 5100
P AXIS: 82 DEGREES
P-R INTERVAL: 222 MS
Q-T INTERVAL: 448 MS
QRS DURATION: 148 MS
QTC CALCULATION (BEZET): 554 MS
R AXIS: 136 DEGREES
T AXIS: -43 DEGREES
UNIT VOLUME: 350 ML
VENTRICULAR RATE: 92 BPM

## 2024-03-24 PROCEDURE — 93925 LOWER EXTREMITY STUDY: CPT | Performed by: SURGERY

## 2024-03-24 PROCEDURE — 99232 SBSQ HOSP IP/OBS MODERATE 35: CPT | Performed by: INTERNAL MEDICINE

## 2024-03-24 RX ORDER — HYDROCODONE BITARTRATE AND ACETAMINOPHEN 5; 325 MG/1; MG/1
1 TABLET ORAL EVERY 4 HOURS PRN
Qty: 20 TABLET | Refills: 0 | Status: SHIPPED | OUTPATIENT
Start: 2024-03-24

## 2024-03-24 RX ORDER — MIDODRINE HYDROCHLORIDE 10 MG/1
10 TABLET ORAL
Qty: 15 TABLET | Refills: 3 | Status: SHIPPED | OUTPATIENT
Start: 2024-03-24

## 2024-03-24 RX ORDER — MIDODRINE HYDROCHLORIDE 5 MG/1
5 TABLET ORAL 3 TIMES DAILY
Qty: 90 TABLET | Refills: 3 | Status: SHIPPED | OUTPATIENT
Start: 2024-03-24

## 2024-03-24 RX ADMIN — MIDODRINE HYDROCHLORIDE 5 MG: 5 TABLET ORAL at 11:09:00

## 2024-03-24 RX ADMIN — FENOFIBRATE 134 MG: 134 CAPSULE ORAL at 08:07:00

## 2024-03-24 RX ADMIN — VANCOMYCIN HYDROCHLORIDE 125 MG: 125 CAPSULE ORAL at 08:07:00

## 2024-03-24 RX ADMIN — CLOPIDOGREL BISULFATE 75 MG: 75 TABLET ORAL at 08:07:00

## 2024-03-24 RX ADMIN — HYDROCODONE BITARTRATE AND ACETAMINOPHEN 1 TABLET: 5; 325 TABLET ORAL at 08:13:00

## 2024-03-24 RX ADMIN — ALLOPURINOL 300 MG: 300 TABLET ORAL at 08:09:00

## 2024-03-24 RX ADMIN — MIDODRINE HYDROCHLORIDE 5 MG: 5 TABLET ORAL at 06:52:00

## 2024-03-24 NOTE — PROGRESS NOTES
[facility]   part of [facility]     Nephrology Progress Note    Maggie Pelletier Patient Status:  Inpatient    1945 MRN [id number]   Location [facility] 6NE-A Attending Oleg Addison MD   Hosp Day # 2 PCP LUZ MARIA BLACKBURN       SUBJECTIVE:  Stable this AM, off pressors- started tid midodrine yest    Physical Exam:   BP 90/60 (BP Location: Right arm)   Pulse 97   Temp 99 °F (37.2 °C) (Temporal)   Resp 19   Wt 141 lb 8 oz (64.2 kg)   SpO2 98%   BMI 27.63 kg/m²   Temp (24hrs), Av.1 °F (36.7 °C), Min:97.4 °F (36.3 °C), Max:99 °F (37.2 °C)       Intake/Output Summary (Last 24 hours) at 3/24/2024 0818  Last data filed at 3/24/2024 0600  Gross per 24 hour   Intake 427.4 ml   Output 1600 ml   Net -1172.6 ml     Last 3 Weights   24 0600 141 lb 8 oz (64.2 kg)   24 0500 152 lb 5.4 oz (69.1 kg)   24 0802 150 lb 5.7 oz (68.2 kg)   24 0912 141 lb 9.6 oz (64.2 kg)   24 1539 145 lb (65.8 kg)   02/15/24 0327 139 lb 12.4 oz (63.4 kg)   24 1724 147 lb 11.3 oz (67 kg)   24 1534 147 lb 11.3 oz (67 kg)   24 1512 145 lb (65.8 kg)   10/04/23 0600 144 lb (65.3 kg)   23 0040 138 lb (62.6 kg)   23 1826 138 lb (62.6 kg)   23 1502 138 lb (62.6 kg)     General: Alert and oriented in no apparent distress.  HEENT: No scleral icterus, MMM  Neck: Supple, no JOE or thyromegaly  Cardiac: Regular rate and rhythm, S1, S2 normal, no murmur or rub  Lungs: Clear without wheezes, rales, rhonchi.    Abdomen: Soft, non-tender. + bowel sounds, no palpable organomegaly  Extremities: Without clubbing, cyanosis or edema. L arm AVF with bruit/thrill  Neurologic:  moving all extremities  Skin: Warm and dry, no rash        Labs:     Recent Labs   Lab 24  1126 24  1954 24  0440   WBC 5.6 6.6 8.7   HGB 11.3* 8.6* 7.4*   .4* 105.3* 107.6*   .0* 134.0* 115.0*   BAND  --  1  --    INR  --  1.53*  --        Recent Labs   Lab 24  1126  03/22/24  0626 03/22/24 1954 03/23/24  0440    135* 136 137   K 3.5 3.6 4.0 4.5    98 99 100   CO2 30.0 26.0 23.0 23.0   BUN 44* 49* 56* 63*   CREATSERUM 4.04* 4.94* 5.37* 5.96*   CA 8.4* 8.6 8.3* 8.2*   MG  --  2.0  --   --    GLU 99 88 125* 111*       Recent Labs   Lab 03/23/24  0440   ALT <6*   AST 30   ALB 2.8*       No results for input(s): \"PGLU\" in the last 168 hours.    Meds:           Impression/Plan:        ESRD- due to HTN.  Typically on HD MWF but held yest due to hypotension.  HD today then again on Monday  PAD- s/p complex intervention.   Per vascular  Anemia- due to ESRD with sig post-op decline.  Transfuse with HD today.  Cont  CHAVEZ w/ HD  Orthostatic hypotension- has been on midodrine with HD now started tid and can titrate prn      Questions/concerns were discussed with patient and/or family by bedside.      No objection  to home from nephrology standpoint    Steve Howell MD  3/24/2024  8:19 AM

## 2024-03-24 NOTE — PLAN OF CARE
Received patient up in chair. VSS on RA. Blood pressure labile, patient c/o blood pressure cuff hurting arm, refusing retakes on low readings intermittently. All docs on case aware, scheduled midodrine given. Patient with orders to discharge home, discharge paperwork reviewed with patient and daughter at bedside, all questions answered. Ivs removed, patient escorted via wheelchair to [place] entrance into care of daughter with belongings in hand.

## 2024-03-24 NOTE — CM/SW NOTE
CM provided RN with Eliquis 30-Day Free coupon card for patient use.     Barbra Concepcion RN Case Manager [id number]   
Call received from Heather with [facility] infusions.  Dr Crowe requested they verify benefits for long-term IV abx. Per Heather, pt qualifies for 100% coverage for daily in office visits only.     Referral for home IV abx sent via [employer]. Will continue to update referral for additional providers as final IV antibiotic plan is known.    CM/SW will remain available for DC planning and/or support.     TOM WaiteN, CMSRN    [id number]    
unknown

## 2024-03-24 NOTE — PROGRESS NOTES
Cardiology Progress Note    Maggie Pelletier Patient Status:  Inpatient    1945 MRN [id number]   Location [facility] 6NE-A Attending Oleg Addison MD   Hosp Day # 2 PCP LUZ MARIA BLACKBURN       Subjective:   Patient seen and examined this AM. Adamant about going home. Has been having some nausea however. No other complaints. Discussed with RN.    Objective:   Temp: 97.4 °F (36.3 °C)  Pulse: 87  Resp: 24  BP: 90/45    Intake/Output:     Intake/Output Summary (Last 24 hours) at 3/24/2024 0737  Last data filed at 3/24/2024 0600  Gross per 24 hour   Intake 427.4 ml   Output 1600 ml   Net -1172.6 ml       Last 3 Weights   24 0600 141 lb 8 oz (64.2 kg)   24 0500 152 lb 5.4 oz (69.1 kg)   24 0802 150 lb 5.7 oz (68.2 kg)   24 0912 141 lb 9.6 oz (64.2 kg)   24 1539 145 lb (65.8 kg)   02/15/24 0327 139 lb 12.4 oz (63.4 kg)   24 1724 147 lb 11.3 oz (67 kg)   24 1534 147 lb 11.3 oz (67 kg)   24 1512 145 lb (65.8 kg)       Tele: NSR     Physical Exam:     General: Alert and oriented x 3. No apparent distress. No respiratory or constitutional distress.  HEENT: Normocephalic, anicteric sclera, neck supple.  Neck: No JVD  Cardiac: Regular rate and rhythm. S1, S2 normal. No murmur, pericardial rub, S3.  Lungs: Clear anteriorly.  Abdomen: Soft, non-tender.   Extremities: No edema.  Wound dressing to right foot. BP cuff to right arm.   Neurologic: Alert and oriented, normal affect.  Skin: Warm and dry.     Laboratory/Data:    Labs:         Recent Labs   Lab 24  1126 24  1954 24  0440   WBC 5.6 6.6 8.7   HGB 11.3* 8.6* 7.4*   .4* 105.3* 107.6*   .0* 134.0* 115.0*   BAND  --  1  --    INR  --  1.53*  --        Recent Labs   Lab 24  1126 24  0624  0440    135* 136 137   K 3.5 3.6 4.0 4.5    98 99 100   CO2 30.0 26.0 23.0 23.0   BUN 44* 49* 56* 63*   CREATSERUM 4.04* 4.94* 5.37* 5.96*   CA 8.4* 8.6  8.3* 8.2*   MG  --  2.0  --   --    GLU 99 88 125* 111*       Recent Labs   Lab 03/23/24  0440   ALT <6*   AST 30   ALB 2.8*       No results for input(s): \"TROP\" in the last 168 hours.    Allergies:   Allergies   Allergen Reactions    Epinephrine OTHER (SEE COMMENTS)     Dental epi. Pain for 2 weeks after dental procedure.       Medications:          Assessment:  Severe PVD - w/ new left fifth toe ulceration   S/p 2/24 right second metatarsal head resection and excision of right foot ulcerations with primary closure  S/p 2/13/24 angioplasty of the right peroneal artery, tibioperoneal trunk, R FSA per Dr. Addison   S/p 3/22/2024 - extensive PCI to bilateral lower extremities including R SFA, R pop, R TP trunk, R prox peroneal, L SFA, L pop, L AT  CAD, remote CABG  On ASA, plavix, BB, statin   Hx of CEA  TAVR 2022  Preserved LVEF  Mean systolic gradient 13 mmhg  Moderate to severe MR, PASP 55 mmHg  ESRD on HD  CLL  Chronic thrombocytopenia        Plan:  Labile blood pressure post procedure.  Off levophed.  P.o. midodrine added - BP better. Can use this temporarily at discharge.  Obtain EKG   Continue Eliquis 2.5 mg twice daily, Plavix 75 mg daily, atorvastatin 20 mg daily  Further recommendations vascular surgery. HD Monday pending per nephroKenny Mi DO  Cardiologist  [facility]  3/24/2024 7:37 AM      Note to the patient: The 21st Century Cures Act makes medical notes like these available to patients in the interest of transparency. However, be advised that this is a medical document. It is intended as peer to peer communication. It is written in medical language and may contain abbreviations or verbiage that are unfamiliar. It may appear blunt or direct. Medical documents are intended to carry relevant information, facts as evident, and clinical opinion of the practitioner.     Disclaimer: Components of this note were documented using voice recognition system and are subject to errors  not corrected at proofreading. Contact the author of this note for any clarifications.

## 2024-03-24 NOTE — PROGRESS NOTES
[facility]   part of [facility]     Hospitalist Progress Note     Maggie Pelletier Patient Status:  Outpatient in a Bed    1945 MRN [id number]   Location [facility] 2NE-A Attending Oleg Addison MD   Hosp Day # 2 PCP LUZ MARIA BLACKBURN     Chief Complaint: med management    Subjective:     Patient without acute events overnith. Off pressors. Denies any CP or SOB. Wants to go home.     Objective:    Review of Systems:   A comprehensive review of systems was completed; pertinent positive and negatives stated in subjective.    Vital signs:  Temp:  [97.4 °F (36.3 °C)-99 °F (37.2 °C)] 99 °F (37.2 °C)  Pulse:  [] 97  Resp:  [12-32] 19  BP: ()/() 90/60  SpO2:  [84 %-99 %] 98 %    Physical Exam:    General: No acute distress  Respiratory: No wheezes, no rhonchi  Cardiovascular: S1, S2, regular rate and rhythm  Abdomen: Soft, Non-tender, non-distended, positive bowel sounds  Neuro: No new focal deficits.   Extremities: No edema, feet bandaged      Diagnostic Data:    Labs:  Recent Labs   Lab 24  1126 24  0440   WBC 5.6 6.6 8.7   HGB 11.3* 8.6* 7.4*   .4* 105.3* 107.6*   .0* 134.0* 115.0*   BAND  --  1  --    INR  --  1.53*  --        Recent Labs   Lab 24  0626 24  0440   GLU 88 125* 111*   BUN 49* 56* 63*   CREATSERUM 4.94* 5.37* 5.96*   CA 8.6 8.3* 8.2*   ALB  --   --  2.8*   * 136 137   K 3.6 4.0 4.5   CL 98 99 100   CO2 26.0 23.0 23.0   ALKPHO  --   --  68   AST  --   --  30   ALT  --   --  <6*   BILT  --   --  0.6   TP  --   --  5.6*       Estimated Creatinine Clearance: 5.6 mL/min (A) (based on SCr of 5.96 mg/dL (H)).    No results for input(s): \"TROP\", \"TROPHS\", \"CK\" in the last 168 hours.    Recent Labs   Lab 24   PTP 18.5*   INR 1.53*                  Microbiology    Hospital Encounter on 24   1. Blood Culture     Status: None (Preliminary result)    Collection Time: 24 10:53 AM     Specimen: Blood,peripheral   Result Value Ref Range    Blood Culture Result No Growth 1 Day N/A   2. Aerobic Bacterial Culture     Status: Abnormal (Preliminary result)    Collection Time: 03/22/24 10:50 AM    Specimen: Foot, right; Other   Result Value Ref Range    Aerobic Culture Result 2+ growth Morganella morganii (A) N/A    Aerobic Culture Result 2+ growth Enterococcus faecalis (A) N/A    Aerobic Smear 3+ Gram positive cocci in pairs N/A    Aerobic Smear 3+ Gram Positive Rods N/A    Aerobic Smear No WBCs seen N/A       Susceptibility    Morganella morganii -  (no method available)     Ampicillin >=32 Resistant      Ampicillin + Sulbactam >=32 Resistant      Cefazolin >=64 Resistant      Cefepime <=1 Sensitive      Ceftriaxone <=1 Sensitive      Ciprofloxacin <=0.25 Sensitive      Gentamicin <=1 Sensitive      Meropenem <=0.25 Sensitive      Levofloxacin <=0.12 Sensitive      Piperacillin + Tazobactam <=4 Sensitive      Trimethoprim/Sulfa <=20 Sensitive          Imaging: Reviewed in Epic.    Medications:    midodrine  5 mg Oral TID    cefTRIAXone  2 g Intravenous Q24H    vancomycin  125 mg Oral BID    clopidogrel  75 mg Oral Daily    atorvastatin  20 mg Oral Nightly    carvedilol  3.125 mg Oral BID with meals    apixaban  2.5 mg Oral BID    epoetin rigoberto  10,000 Units Intravenous Once in dialysis    allopurinol  300 mg Oral Daily    fenofibrate micronized  134 mg Oral Daily with breakfast       Assessment & Plan:      #Severe PAD with ulceration  S/p angiogram 3/21/2024  S/p extensive PCI to bilateral lower extremities on 3/22  Vascular surgery/Cardiology/podiatry following  Pain control     #ESRD  Renal following  Continue HD     #CAD s/p cABG  #S/p TAVR  Continue cardiac meds  Low dose midodrine  Monitor BP closely  Off pressors     #CLL  Monitor CBC    #Abnormal UA/UTI  Finishes abx today prior to DC      Baltazar Ortiz MD    Supplementary Documentation:     Quality:  DVT Mechanical Prophylaxis:         DVT Pharmacologic Prophylaxis   Medication    apixaban (Eliquis) tab 2.5 mg                Code Status: Full Code  Marin: No urinary catheter in place  Marin Duration (in days):   Central line:    WOLFGANG: 3/24/2024    Discharge is dependent on: progress  At this point Ms. Pelletier is expected to be discharge to: home    The 21st Century Cures Act makes medical notes like these available to patients in the interest of transparency. Please be advised this is a medical document. Medical documents are intended to carry relevant information, facts as evident, and the clinical opinion of the practitioner. The medical note is intended as peer to peer communication and may appear blunt or direct. It is written in medical language and may contain abbreviations or verbiage that are unfamiliar.

## 2024-03-24 NOTE — DISCHARGE INSTRUCTIONS
Notify office if temperature> 100.5 Take Eliquis 2.5mg po bid/ Plavix 75mg po q day. Call if groin wound drainage.

## 2024-03-24 NOTE — PROGRESS NOTES
Assumed care of patient at 1930. Pt resting in bed. VSS on room air. 1st degree AV heart block, frequent PVC. Levophed titrating to SBP goal >90. See MAR. Midodrine given scheduled. Anuric, baseline for patient. HD completed at shift change, 1.6L output. Bilateral groin sites soft, clean, dry, intact with no hematoma noted. Pedal, post tib pulses per doppler. R pedal fleeting and difficult to locate. Post tib heard. Pt ambulates with one/walker.    2202 - 14 beats vtach, pt asymptomatic, converted back to 1st degree AVB.     0310 - Levo on standby

## 2024-03-25 ENCOUNTER — OFFICE VISIT (OUTPATIENT)
Dept: WOUND CARE | Facility: HOSPITAL | Age: 79
End: 2024-03-25
Attending: NURSE PRACTITIONER
Payer: MEDICARE

## 2024-03-25 VITALS
DIASTOLIC BLOOD PRESSURE: 64 MMHG | TEMPERATURE: 97 F | RESPIRATION RATE: 16 BRPM | SYSTOLIC BLOOD PRESSURE: 98 MMHG | HEART RATE: 85 BPM

## 2024-03-25 DIAGNOSIS — S91.204D: ICD-10-CM

## 2024-03-25 DIAGNOSIS — I73.9 PAD (PERIPHERAL ARTERY DISEASE) (HCC): ICD-10-CM

## 2024-03-25 DIAGNOSIS — Z89.421 HISTORY OF PARTIAL RAY AMPUTATION OF SECOND TOE OF RIGHT FOOT (HCC): Primary | ICD-10-CM

## 2024-03-25 DIAGNOSIS — M79.671 BILATERAL FOOT PAIN: ICD-10-CM

## 2024-03-25 DIAGNOSIS — Z98.890 POST-OPERATIVE STATE: ICD-10-CM

## 2024-03-25 DIAGNOSIS — L97.529 ULCER OF TOE, LEFT, WITH UNSPECIFIED SEVERITY (HCC): ICD-10-CM

## 2024-03-25 DIAGNOSIS — N18.6 ESRD (END STAGE RENAL DISEASE) (HCC): ICD-10-CM

## 2024-03-25 DIAGNOSIS — M79.672 BILATERAL FOOT PAIN: ICD-10-CM

## 2024-03-25 LAB
BLOOD TYPE BARCODE: 5100
UNIT VOLUME: 350 ML

## 2024-03-25 PROCEDURE — 11042 DBRDMT SUBQ TIS 1ST 20SQCM/<: CPT | Performed by: PODIATRIST

## 2024-03-25 NOTE — DISCHARGE SUMMARY
[facility]    PATIENT'S NAME: JEFFREY FLORES   ATTENDING PHYSICIAN: Oleg Addison M.D.   PATIENT ACCOUNT#:   [id number]    LOCATION:  [address] [place] [address]  MEDICAL RECORD #:   [id number]       YOB: 1945  ADMISSION DATE:       03/21/2024      DISCHARGE DATE:  03/24/2024    DISCHARGE SUMMARY    HISTORY AND HOSPITAL COURSE:  A 78-year-old white female, end-stage renal disease with bilateral gangrene, being discharged home today.  The patient had bilateral superficial femoral artery angioplasty, Shockwave on the left side, and then also a drug-coated balloon angioplasty bilateral.  She also had angioplasty of popliteal and tibial vessels on the right side, tibioperoneal trunk and peroneal artery on the left side with anterior tibial artery, popliteal artery.  The patient had a questionable emboli on the left side, treated with the angioplasty and a Penumbra, and the artery was widely open at the end.  Anterior tibial artery closed with collaterals into the distal peroneal artery to give blood supply to the foot.  The area is open.  The amount of blood flow going into the foot is improved.  On the right side, similar results of the procedure.  She has small tips of gangrene at the third toe on the left side and the fourth and fifth webspace, and on the right side, gangrene where the previous toe amputation was done, a little bit of eschar there and on the toe next to it, but all appears to be superficial.  At this time, will watch.  She had a small fistula on the right side at the tibioperoneal trunk distally in the vein.  It should resolve spontaneously.  Discussed and reviewed this with Dr. Valenzuela and Dr. Hernandez.  At this point, we will keep her on Eliquis due to the left leg possible questionable emboli and keep her on Plavix also.  She also has a low blood pressure and put on midodrine 10 mg t.i.d. and additional midodrine during dialysis.  The blood pressure, difficult to tell because it  may not be accurate on the right side, was read in the 90s.  Fistula still functioning in the left arm.  This was placed at [facility] in the past by Dr. Sears.  Will continue to follow.  All questions answered for the patient, and I have texted and left Dr. Kishan Grady, her cardiologist in [place], as well as Dr. Alexa Keith, her nephrologist, a note.    Dictated By Oleg Addison M.D.  d: 03/24/2024 08:54:26  t: 03/24/2024 10:08:03  [facility] [id number]  NIVIA/

## 2024-03-25 NOTE — CDS QUERY
Dear Doctor Azael,     Can the type of Anemia with administration of blood products be further specified    (  )  Anemia due to postop blood loss  (  )  Other (please specify):         Clinical indicators: 3/21 78 Y/F direct admit for cath lab procedure      3/13 Hgb- 11.6, Hct- 32.6 (preoperative labs)  3/23: Hgb- 7.4, Hct- 21.2      3/23 surgery progress note- Hemoglobin has drifted down to 7.4. Discussed with Dr. Howell and will give her 1 unit of blood. She is being dialyzed today.     3/23 nephrology progress note- Anemia- due to ESRD with sig post-op decline.  Transfuse with HD today.  Cont CHAVEZ w/ HD    Risks: ESRD, anemia    Treatment: monitor CBC, type and screen, transfuse PRBC        Use of terms such as suspected, possible, or probable (associated with a specific diagnosis that is being evaluated, monitored, or treated as if it exists) are acceptable and can be coded in the inpatient setting, when documented at the time of discharge.     Please add any additional documentation to your progress note and continue to document this through discharge.    If you have any questions, please contact Clinical : Myesha Mi -[phone]. Thank you.     THIS FORM IS A PERMANENT PART OF THE MEDICAL RECORD

## 2024-03-25 NOTE — PROGRESS NOTES
Patient ID: Magige Pelletier is a 78 year old female.    Debridement Traumatic Toe (Comment which one)   Wound 03/04/24 #6 Right 3rd toe Toe (Comment which one)    Performed by: Colten Ba DPM  Authorized by: Colten Ba DPM      Consent   Consent obtained? verbal  Consent given by: patient  Risks discussed? procedural risks discussed  Time out called at 3/25/2024 3:40 PM    Debridement Details  Performed by: physician  Debridement type: surgical  Level of debridement: subcutaneous tissue  Pain control: lidocaine 4%  Pain control administration type: topical    Pre-debridement measurements  Length (cm): 1  Width (cm): 0.8  Depth (cm): 0.1  Surface Area (cm^2): 0.8    Post-debridement measurements  Length (cm): 1.1  Width (cm): 0.9  Depth (cm): 0.1  Percent debrided: 100%  Surface Area (cm^2): 0.99  Area Debrided (cm^2): 0.99  Volume (cm^3): 0.1    Tissue and other material debrided: subcutaneous tissue  Devitalized tissue debrided: biofilm, callus and necrotic debris  Instrument(s) utilized: nippers  Bleeding: small  Hemostasis obtained with: not applicable  Procedural pain (0-10): 0  Post-procedural pain: 0   Response to treatment: procedure was tolerated well

## 2024-03-26 NOTE — PROGRESS NOTES
Weekly Wound Education Note    Teaching Provided To: Patient  Training Topics: Cleasing and general instructions;Discharge instructions;Dressing     Training Response: Patient responds and understands        Notes: Betadine and gauze to right toe wounds change daily. Reviewed s/s of acute infection and to seek immediate medical attention if needed

## 2024-04-01 ENCOUNTER — OFFICE VISIT (OUTPATIENT)
Dept: WOUND CARE | Facility: HOSPITAL | Age: 79
End: 2024-04-01
Attending: NURSE PRACTITIONER
Payer: MEDICARE

## 2024-04-01 VITALS
TEMPERATURE: 99 F | SYSTOLIC BLOOD PRESSURE: 79 MMHG | DIASTOLIC BLOOD PRESSURE: 32 MMHG | HEART RATE: 77 BPM | RESPIRATION RATE: 16 BRPM

## 2024-04-01 DIAGNOSIS — Z89.421 HISTORY OF PARTIAL RAY AMPUTATION OF SECOND TOE OF RIGHT FOOT (HCC): Primary | ICD-10-CM

## 2024-04-01 DIAGNOSIS — N18.6 ESRD (END STAGE RENAL DISEASE) (HCC): ICD-10-CM

## 2024-04-01 DIAGNOSIS — L97.529 ULCER OF TOE, LEFT, WITH UNSPECIFIED SEVERITY (HCC): ICD-10-CM

## 2024-04-01 DIAGNOSIS — I96 NECROTIC TOES (HCC): ICD-10-CM

## 2024-04-01 DIAGNOSIS — I73.9 PAD (PERIPHERAL ARTERY DISEASE) (HCC): ICD-10-CM

## 2024-04-01 PROCEDURE — 99214 OFFICE O/P EST MOD 30 MIN: CPT

## 2024-04-01 NOTE — PROGRESS NOTES
Subjective   Maggie Pelletier is a 78 year old female.    Chief Complaint   Patient presents with    Wound Care     Pt here for follow up wound care visit to right 3rd toe, right great toe and left 5th plantar toe. Pt denies any concerns or issues.       HPI  Maggie Pelletier is a 78 year old female with end-stage renal disease on dialysis, PAD, and CLL who is status post right second metatarsal head resection with excision of right foot ulcerations with primary closure (DOS: 2/15/2024).  Patient is doing okay overall.  She recently followed up with Dr. Addison, who did perform angiograms to bilateral lower extremities.  Doppler flow recently has been appreciated in the distal left anterior tibial artery of the right posterior tibial artery.  Dr. Addison is recommending against any further lower extremity procedures for the next 2 weeks.  Patient does continue to have pain to bilateral feet.  No other concerns at this time        Review of Systems  Denies nausea, vomiting, fever, chills, shortness of breath, chest pain, and calf pain.    Objective    Physical Exam:    Derm:  Wound Assessment  Wound 02/19/24 #3 Left Fifth Toe Left (Active)   Wound Image   03/25/24 1511   Drainage Amount None 03/25/24 1511   Drainage Description Serous;Yellow 03/18/24 1445   Wound Length (cm) 0.4 cm 03/25/24 1511   Wound Width (cm) 0.3 cm 03/25/24 1511   Wound Surface Area (cm^2) 0.12 cm^2 03/25/24 1511   Wound Depth (cm) 0.1 cm 03/25/24 1511   Wound Volume (cm^3) 0.012 cm^3 03/25/24 1511   Wound Healing % -1100 03/25/24 1511   Margins Well-defined edges 03/25/24 1511   Non-staged Wound Description Full thickness 03/25/24 1511   Mya-wound Assessment Dry;Edema 03/25/24 1511   Wound Granulation Tissue Pale Grey;Pink 02/19/24 1508   Wound Bed Granulation (%) 100 % 02/19/24 1508   Wound Bed Slough (%) 100 % 03/18/24 1445   Wound Bed Eschar (%) 100 % 03/25/24 1511   Wound Odor None 03/25/24 1511   Shape 100% scabbed 03/11/24 1501    Tunneling? No 03/11/24 1501   Undermining? No 03/11/24 1501   Sinus Tracts? No 03/11/24 1501       Wound 03/04/24 #6 Right 3rd toe Toe (Comment which one) (Active)   Wound Image   03/25/24 1510   Drainage Amount Small 03/25/24 1509   Drainage Description Serosanguineous 03/25/24 1509   Wound Length (cm) 1.1 cm 03/25/24 1510   Wound Width (cm) 0.9 cm 03/25/24 1510   Wound Surface Area (cm^2) 0.99 cm^2 03/25/24 1510   Wound Depth (cm) 0.1 cm 03/25/24 1510   Wound Volume (cm^3) 0.099 cm^3 03/25/24 1510   Wound Healing % 36 03/25/24 1510   Margins Well-defined edges 03/25/24 1509   Non-staged Wound Description Full thickness 03/25/24 1509   Mya-wound Assessment Edema;Moist 03/25/24 1509   Wound Granulation Tissue Pink;Pale Mack;Firm 03/18/24 1441   Wound Bed Granulation (%) 100 % 03/18/24 1441   Wound Bed Epithelium (%) 60 % 03/04/24 1558   Wound Bed Slough (%) 100 % 03/25/24 1509   Wound Odor None 03/25/24 1509   Tunneling? No 03/11/24 1459   Undermining? No 03/11/24 1459   Sinus Tracts? No 03/11/24 1459       Wound 03/21/24 Leg Anterior;Proximal;Right;Upper (Active)       Integument: Right third digit is appearing necrotic at its tip.  No current signs of acute infection.  Vascular: pedal pulses are nonpalpable bilaterally. CFT roughly 5 sec to digits.  Minimal CFT to the left fifth digit with some duskiness noted  Musculoskeletal: no acute deformities noted.  Status post right second metatarsal head resection  Neurological: Gross sensation intact via light touch.  Protective sensation diminished via Ipswitch test.    Vital Signs  Vital Signs    03/25/24 1502 03/25/24 1522   BP: (!) 73/35 98/64   Pulse: 85    Resp: 16    Temp: 97.3 °F (36.3 °C)    PainSc: 6 - (Moderate)          Allergies  Allergies   Allergen Reactions    Epinephrine OTHER (SEE COMMENTS)     Dental epi. Pain for 2 weeks after dental procedure.       Assessment    Encounter Diagnosis  1. History of partial ray amputation of second toe of right foot  (HCC)    2. Ulcer of toe, left, with unspecified severity (HCC)    3. Traumatic loss of toenail of lesser toe of right foot, subsequent encounter    4. PAD (peripheral artery disease) (HCC)    5. ESRD (end stage renal disease) (HCC)    6. Bilateral foot pain    7. Post-operative state        Problem List  Patient Active Problem List   Diagnosis    Gangrene of toe of right foot (HCC)    Ischemic pain of foot, right    PAD (peripheral artery disease) (HCC)    Great toe amputation status, right    Gangrene of right foot (HCC)    ESRD (end stage renal disease) (HCC)    Primary hypertension    Anemia in ESRD (end-stage renal disease) (HCC)    Carotid stenosis, right    Osteomyelitis of right foot (HCC)    Ulcer of right foot, unspecified ulcer stage (HCC)    CLL (chronic lymphocytic leukemia) (HCC)    Status post amputation of lesser toe of right foot (HCC)    Gangrene of toe of left foot (HCC)    Right foot ulcer, with necrosis of bone (HCC)    Coronary artery disease involving native heart without angina pectoris    Orthostatic hypotension       Plan  Orders:  Orders Placed This Encounter   Procedures    Debridement Traumatic Toe (Comment which one)         -Patient examined, chart history reviewed.    -Patient is status post right second metatarsal head resection with excision of dorsal and plantar ulcerations of the right foot with primary closure     Examined surgical site today--continued fibrous wound base noted to dorsal incision site.  There remains an area of opening near the base of the third digit.  Tip of the third digit is now appearing necrotic.  Plantar foot incision remains approximated with dry, stable eschar surrounding incision site.  Dorsal portion of the surgical wound appears slightly macerated near the base of the third toe with significant amount of dry, stable eschar/hyperkeratotic tissue.     Examined left foot--left fifth digit eschar does appear stable today.  No current signs of infection.   Some improvements in duskiness noted to the fifth digit      All wounds cleansed today and covered with Betadine soaked gauze and a dry dressing.  Will have patient change her dressings on a daily basis.    Patient may need further amputation in the future if she continues to not improve.  She knows she is at high risk of not healing.  Will keep in close contact with Dr. Addison in regards to patient's necrotic wounds    -Educated on signs of infection and encouraged patient to seek immediate medical attention if noticing any of these signs.    Follow-Up  1 week    Kev Ba DPM    3/31/2024    Dragon speech recognition software was used to prepare this note.  Errors in word recognition may occur.  Please contact me with any questions/concerns with this note.

## 2024-04-01 NOTE — PROGRESS NOTES
Subjective   Maggie Pelletier is a 78 year old female.    Chief Complaint   Patient presents with    Wound Care     Patient arrives for a wound care follow up appointment. Patient arrives with bilateral surgical shoes. Patient states she has moderate pain. Patient states she fell this morning, and is worried she did damage to the toes. Patient has gauze to the toes.        HPI  Maggie Pelletier is a 78 year old female with end-stage renal disease on dialysis, PAD, and CLL who is status post right second metatarsal head resection with excision of right foot ulcerations with primary closure (DOS: 2/15/2024).  Patient states that she is doing all right overall.  She has been keeping her feet covered at all times.  Patient does have a follow-up with Dr. Addison after her visit today.  Patient continues to have pain in both of her feet.  She also states that she has been having chronic nausea and vomiting, which has worsened within the last week.  Patient overall looks fatigued today.  Patient does relate recent vomiting as well.  No other concerns today.    Review of Systems  Denies nausea, vomiting, fever, chills, shortness of breath, chest pain, and calf pain.    Objective    Physical Exam:    Derm:  Wound Assessment  Wound 02/19/24 #3 Left Fifth Toe Left (Active)   Wound Image   04/01/24 1512   Drainage Amount None 04/01/24 1512   Drainage Description Serous;Yellow 03/18/24 1445   Wound Length (cm) 0.5 cm 04/01/24 1512   Wound Width (cm) 0.4 cm 04/01/24 1512   Wound Surface Area (cm^2) 0.2 cm^2 04/01/24 1512   Wound Depth (cm) 0.1 cm 04/01/24 1512   Wound Volume (cm^3) 0.02 cm^3 04/01/24 1512   Wound Healing % -1900 04/01/24 1512   Margins Well-defined edges 04/01/24 1512   Non-staged Wound Description Full thickness 04/01/24 1512   Mya-wound Assessment Dry;Edema 04/01/24 1512   Wound Granulation Tissue Pale Grey;Pink 02/19/24 1508   Wound Bed Granulation (%) 100 % 02/19/24 1508   Wound Bed Slough (%) 100 %  03/18/24 1445   Wound Bed Eschar (%) 100 % 04/01/24 1512   Wound Odor None 04/01/24 1512   Shape 100% scabbed 03/11/24 1501   Tunneling? No 04/01/24 1512   Undermining? No 04/01/24 1512   Sinus Tracts? No 04/01/24 1512       Wound 03/04/24 #6 Right 3rd toe Toe (Comment which one) (Active)   Wound Image   04/01/24 1512   Drainage Amount Scant 04/01/24 1512   Drainage Description Serous;Yellow 04/01/24 1512   Wound Length (cm) 0.2 cm 04/01/24 1512   Wound Width (cm) 0.3 cm 04/01/24 1512   Wound Surface Area (cm^2) 0.06 cm^2 04/01/24 1512   Wound Depth (cm) 0.1 cm 04/01/24 1512   Wound Volume (cm^3) 0.006 cm^3 04/01/24 1512   Wound Healing % 96 04/01/24 1512   Margins Well-defined edges 04/01/24 1512   Non-staged Wound Description Full thickness 04/01/24 1512   Mya-wound Assessment Edema;Dry 04/01/24 1512   Wound Granulation Tissue Pink;Pale Mack;Firm 03/18/24 1441   Wound Bed Granulation (%) 100 % 03/18/24 1441   Wound Bed Epithelium (%) 60 % 03/04/24 1558   Wound Bed Slough (%) 100 % 04/01/24 1512   Wound Odor None 04/01/24 1512   Tunneling? No 04/01/24 1512   Undermining? No 04/01/24 1512   Sinus Tracts? No 04/01/24 1512       Wound 03/21/24 Leg Anterior;Proximal;Right;Upper (Active)       Integument: Right third digit is continuing to appear necrotic at its tip.  No current signs of acute infection.  Bilateral forefeet are cool compared to midfoot and rear foot  Vascular: pedal pulses are nonpalpable bilaterally. CFT roughly 5 sec to digits.  Minimal CFT to the left fifth digit with continued duskiness noted  Musculoskeletal: no acute deformities noted.  Status post right second metatarsal head resection.  Pain with palpation to forefeet.  Neurological: Gross sensation intact via light touch.  Protective sensation diminished via Ipswitch test.    Vital Signs  Vital Signs    04/01/24 1515   BP: (!) 79/32   Pulse: 77   Resp: 16   Temp: 98.7 °F (37.1 °C)   PainSc: 6 - (Moderate)         Allergies  Allergies    Allergen Reactions    Epinephrine OTHER (SEE COMMENTS)     Dental epi. Pain for 2 weeks after dental procedure.       Assessment    Encounter Diagnosis  1. History of partial ray amputation of second toe of right foot (HCC)    2. Necrotic toes (HCC)    3. Ulcer of toe, left, with unspecified severity (HCC)    4. PAD (peripheral artery disease) ([facility])    5. ESRD (end stage renal disease) ([facility])        Problem List  Patient Active Problem List   Diagnosis    Gangrene of toe of right foot (HCC)    Ischemic pain of foot, right    PAD (peripheral artery disease) ([facility])    Great toe amputation status, right    Gangrene of right foot (HCC)    ESRD (end stage renal disease) (HCC)    Primary hypertension    Anemia in ESRD (end-stage renal disease) ([facility])    Carotid stenosis, right    Osteomyelitis of right foot (HCC)    Ulcer of right foot, unspecified ulcer stage ([facility])    CLL (chronic lymphocytic leukemia) ([facility])    Status post amputation of lesser toe of right foot (HCC)    Gangrene of toe of left foot (HCC)    Right foot ulcer, with necrosis of bone (HCC)    Coronary artery disease involving native heart without angina pectoris    Orthostatic hypotension       Plan  Orders:  No orders of the defined types were placed in this encounter.        -Patient examined, chart history reviewed.    -Patient is status post right second metatarsal head resection with excision of dorsal and plantar ulcerations of the right foot with primary closure     Examined surgical site today--continued fibrous wound base noted to dorsal incision site.  There remains an area of opening near the base of the third digit.  Tip of the third digit is continuing to appear necrotic.  Plantar foot incision remains approximated with dry, stable eschar surrounding incision site.  Dorsal portion of the surgical wound appears slightly macerated near the base of the third toe with significant amount of dry, stable eschar/hyperkeratotic tissue.     Examined left  foot--left fifth digit eschar does appear stable today.  No current signs of infection.  Cyanotic changes continued to the left fifth digit     All wounds cleansed today and covered with Betadine soaked gauze and a dry dressing.  Will have patient change her dressings on a daily basis.  Our goal is to prevent any infections to bilateral feet.     Discussed with patient and her daughter today that she may need further amputation of the right foot as her previous incision sites are failing to heal.  She knows she is at high risk of not healing.  Will keep in close contact with Dr. Addison in regards to patient's necrotic wounds    Will reach out to Dr. Addison to discuss further options.  Due to ongoing necrotic changes with known microvascular disease, next options for patient would be a transmetatarsal amputation of the right foot.    Patient also states that she has been having ongoing nausea and vomiting and is having trouble with caloric and protein intake.  Patient states that she has just been drinking protein drinks (Boost) daily.  Patient states that the nausea and vomiting has been ongoing, but does admit to having worsening symptoms the past week.  Patient does look lethargic overall.  I did recommend getting this evaluated in the ED.  Patient states that she will get this evaluated if things do not improve    -Educated on signs of infection and encouraged patient to seek immediate medical attention if noticing any of these signs.    Follow-Up  1 week    Kev Ba DPM    4/1/2024    Dragon speech recognition software was used to prepare this note.  Errors in word recognition may occur.  Please contact me with any questions/concerns with this note.

## 2024-04-01 NOTE — PROGRESS NOTES
Weekly Wound Education Note    Teaching Provided To: Patient;Family  Training Topics: Dressing;Cleasing and general instructions;Discharge instructions;Nutrition  Training Method: Demonstration;Explain/Verbal;Written  Training Response: Patient responds and understands        Notes: Continue to apply batedine and gauze to bilateral foot/toe wounds, follow up with vascular as scheduled. Pt reports no appetite, encouraged to increase protein intake for wound healing.

## 2024-04-03 NOTE — TELEPHONE ENCOUNTER
Patient requesting to schedule surgery on 4/14 if he has availability. Please call at [phone],thanks.

## 2024-04-03 NOTE — TELEPHONE ENCOUNTER
Thank you for contacting patient's daughter.  Per Dr. Addison, I will wait to hear from him once patient has ultrasound results to discuss further treatment plan.  We can reassure patient and her daughter that if surgery is needed, we can hopefully get her into the OR within 1-2 weeks.  Thank you

## 2024-04-03 NOTE — TELEPHONE ENCOUNTER
I have attempted to call patient twice to discuss recommendations.  If you can get a hold of patient, please let her know that I did discuss with Dr. Addison, who is recommending we hold off on any procedures before getting an updated arterial ultrasound.  Thank you

## 2024-04-03 NOTE — TELEPHONE ENCOUNTER
S/w Daughter of patient- Informed her of Dr Ba's message. She just states that she is trying to be proactive in getting surgery scheduled if necessary. It does look like patient has US on 4/11/24. I told her that Dr Ba and Dr Addison would be in communication after patient completes US to figure out best plan of care for patient. She verbalized understanding.    EMANUEL

## 2024-04-08 ENCOUNTER — OFFICE VISIT (OUTPATIENT)
Dept: WOUND CARE | Facility: HOSPITAL | Age: 79
End: 2024-04-08
Attending: NURSE PRACTITIONER
Payer: MEDICARE

## 2024-04-08 VITALS
SYSTOLIC BLOOD PRESSURE: 67 MMHG | RESPIRATION RATE: 18 BRPM | TEMPERATURE: 98 F | HEART RATE: 98 BPM | DIASTOLIC BLOOD PRESSURE: 33 MMHG

## 2024-04-08 DIAGNOSIS — L97.529 ULCER OF TOE, LEFT, WITH UNSPECIFIED SEVERITY (HCC): ICD-10-CM

## 2024-04-08 DIAGNOSIS — Z89.421 HISTORY OF PARTIAL RAY AMPUTATION OF SECOND TOE OF RIGHT FOOT (HCC): Primary | ICD-10-CM

## 2024-04-08 DIAGNOSIS — N18.6 ESRD (END STAGE RENAL DISEASE) (HCC): ICD-10-CM

## 2024-04-08 DIAGNOSIS — I73.9 PAD (PERIPHERAL ARTERY DISEASE) (HCC): ICD-10-CM

## 2024-04-08 DIAGNOSIS — M79.671 BILATERAL FOOT PAIN: ICD-10-CM

## 2024-04-08 DIAGNOSIS — I96 NECROTIC TOES (HCC): ICD-10-CM

## 2024-04-08 DIAGNOSIS — M79.672 BILATERAL FOOT PAIN: ICD-10-CM

## 2024-04-08 PROCEDURE — 99213 OFFICE O/P EST LOW 20 MIN: CPT

## 2024-04-08 RX ORDER — HYDROCODONE BITARTRATE AND ACETAMINOPHEN 5; 325 MG/1; MG/1
0.5 TABLET ORAL EVERY 6 HOURS PRN
Qty: 20 TABLET | Refills: 0 | Status: SHIPPED | OUTPATIENT
Start: 2024-04-08 | End: 2024-04-18

## 2024-04-08 NOTE — PROGRESS NOTES
Subjective   Maggie Pelletier is a 78 year old female.    Chief Complaint   Patient presents with    Wound Care     Patient is here for follow up and arrives with betadine and gauze to her wounds. She has no new concerns at thsi time.        HPI  Maggie Pelletier is a 78 year old female.   Chief Complaint   Patient presents with    Wound Care     Patient is here for follow up and arrives with betadine and gauze to her wounds. She has no new concerns at thsi time.          Review of Systems  Denies nausea, vomiting, fever, chills, shortness of breath, chest pain, and calf pain.    Objective    Physical Exam:    Derm:  Wound Assessment  Wound 02/19/24 #3 Left Fifth Toe Left (Active)   Wound Image   04/08/24 1513   Drainage Amount None 04/08/24 1513   Drainage Description Serous;Yellow 03/18/24 1445   Wound Length (cm) 0.5 cm 04/08/24 1513   Wound Width (cm) 0.4 cm 04/08/24 1513   Wound Surface Area (cm^2) 0.2 cm^2 04/08/24 1513   Wound Depth (cm) 0.1 cm 04/08/24 1513   Wound Volume (cm^3) 0.02 cm^3 04/08/24 1513   Wound Healing % -1900 04/08/24 1513   Margins Well-defined edges 04/08/24 1513   Non-staged Wound Description Full thickness 04/08/24 1513   Mya-wound Assessment Dry;Edema 04/08/24 1513   Wound Granulation Tissue Pale Grey;Pink 02/19/24 1508   Wound Bed Granulation (%) 100 % 02/19/24 1508   Wound Bed Slough (%) 100 % 03/18/24 1445   Wound Bed Eschar (%) 100 % 04/08/24 1513   Wound Odor None 04/01/24 1512   Shape 100% scabbed 03/11/24 1501   Tunneling? No 04/08/24 1513   Undermining? No 04/08/24 1513   Sinus Tracts? No 04/08/24 1513       Wound 03/04/24 #6 Right 3rd toe Toe (Comment which one) (Active)   Wound Image   04/08/24 1513   Drainage Amount Small 04/08/24 1513   Drainage Description Serous;Yellow 04/08/24 1513   Wound Length (cm) 3.5 cm 04/08/24 1513   Wound Width (cm) 0.7 cm 04/08/24 1513   Wound Surface Area (cm^2) 2.45 cm^2 04/08/24 1513   Wound Depth (cm) 0.2 cm 04/08/24 1513   Wound Volume  (cm^3) 0.49 cm^3 04/08/24 1513   Wound Healing % -218 04/08/24 1513   Margins Well-defined edges;Epibole (Rolled edges) 04/08/24 1513   Non-staged Wound Description Full thickness 04/08/24 1513   Mya-wound Assessment Edema;Dry 04/08/24 1513   Wound Granulation Tissue Pink;Spongy 04/08/24 1513   Wound Bed Granulation (%) 10 % 04/08/24 1513   Wound Bed Epithelium (%) 60 % 03/04/24 1558   Wound Bed Slough (%) 90 % 04/08/24 1513   Wound Odor None 04/08/24 1513   Tunneling? No 04/08/24 1513   Undermining? No 04/08/24 1513   Sinus Tracts? No 04/08/24 1513       Wound 03/21/24 Leg Anterior;Proximal;Right;Upper (Active)       ***  Vascular: pedal pulses are palpable. CFT <3 sec to digits  Musculoskeletal: no acute deformities noted.  5/5 muscle strength to all muscles crossing ankle joint  Neurological: Gross sensation intact via light touch.  Protective sensation diminished via Ipswitch test.    Vital Signs  Vital Signs    04/08/24 1505   BP: (!) 67/33   Pulse: 98   Resp: 18   Temp: 97.9 °F (36.6 °C)   PainSc: 8 - (Severe)         Allergies  Allergies   Allergen Reactions    Epinephrine OTHER (SEE COMMENTS)     Dental epi. Pain for 2 weeks after dental procedure.       Assessment    Encounter Diagnosis  1. History of partial ray amputation of second toe of right foot ([facility])    2. Necrotic toes ([facility])    3. Ulcer of toe, left, with unspecified severity ([facility])    4. PAD (peripheral artery disease) ([facility])    5. ESRD (end stage renal disease) ([facility])    6. Bilateral foot pain        Problem List  Patient Active Problem List   Diagnosis    Gangrene of toe of right foot ([facility])    Ischemic pain of foot, right    PAD (peripheral artery disease) ([facility])    Great toe amputation status, right    Gangrene of right foot ([facility])    ESRD (end stage renal disease) ([facility])    Primary hypertension    Anemia in ESRD (end-stage renal disease) ([facility])    Carotid stenosis, right    Osteomyelitis of right foot ([facility])    Ulcer of right foot, unspecified ulcer  stage ([facility]CC)    CLL (chronic lymphocytic leukemia) (HCC)    Status post amputation of lesser toe of right foot (HCC)    Gangrene of toe of left foot (HCC)    Right foot ulcer, with necrosis of bone (HCC)    Coronary artery disease involving native heart without angina pectoris    Orthostatic hypotension       Plan  Orders:  No orders of the defined types were placed in this encounter.        -Patient examined, chart history reviewed.      -Rx: Norco 5/325mg 0.5 po for pain.    -Educated on signs of infection and encouraged patient to seek immediate medical attention if noticing any of these signs.    Follow-Up  ***    Kev Ba DPM    4/8/2024    Dragon speech recognition software was used to prepare this note.  Errors in word recognition may occur.  Please contact me with any questions/concerns with this note.    Betadine soaked gauze and a dry dressing.  Will have patient change her dressings on a daily basis.  Our goal is to prevent any infections to bilateral feet.     Discussed with patient and her daughter today that she will most likely need further amputation of the right foot as her previous incision sites are failing to heal.  She knows she is at high risk of not healing.  Patient currently has arterial ultrasound scheduled that were ordered by Dr. Addison.  Dr. Addison has advised to hold off on any procedures until further evaluation of blood flow is performed.  Will have further discussion with patient and her family following vascular recommendations     Patient also states that she has been having ongoing nausea and vomiting and is having trouble with caloric and protein intake.  Patient states that she has just been drinking protein drinks (Boost) daily.  Patient does overall look better today.  She does appear in acute distress due to ongoing pain to bilateral feet.    -Rx: Norco 5/325mg 0.5 po for pain.    -Educated on signs of infection and encouraged patient to seek immediate medical attention if noticing any of these signs.    Follow-Up  1 week    Kev Ba DPM    4/8/2024    Dragon speech recognition software was used to prepare this note.  Errors in word recognition may occur.  Please contact me with any questions/concerns with this note.

## 2024-04-09 NOTE — PROGRESS NOTES
Weekly Wound Education Note    Teaching Provided To: Patient;Family  Training Topics: Dressing;Discharge instructions;Off-loading  Training Method: Demonstration;Explain/Verbal;Written  Training Response: Patient responds and understands        Notes: Betadine and gauze to left 5th and right 3rd toes. Change dressing daily. Felted foam donut applied around left bunion. Extra felted foam provided to change as needed.

## 2024-04-11 ENCOUNTER — HOSPITAL ENCOUNTER (OUTPATIENT)
Dept: ULTRASOUND IMAGING | Facility: HOSPITAL | Age: 79
Discharge: HOME | End: 2024-04-11
Attending: SURGERY
Payer: MEDICARE

## 2024-04-11 DIAGNOSIS — I70.263 ATHEROSCLEROSIS OF NATIVE ARTERIES OF EXTREMITIES WITH GANGRENE, BILATERAL LEGS (HCC): ICD-10-CM

## 2024-04-11 PROCEDURE — 93925 LOWER EXTREMITY STUDY: CPT | Performed by: SURGERY

## 2024-04-15 ENCOUNTER — APPOINTMENT (OUTPATIENT)
Dept: WOUND CARE | Facility: HOSPITAL | Age: 79
End: 2024-04-15
Attending: NURSE PRACTITIONER
Payer: MEDICARE

## 2024-04-15 VITALS
SYSTOLIC BLOOD PRESSURE: 139 MMHG | HEART RATE: 78 BPM | RESPIRATION RATE: 17 BRPM | TEMPERATURE: 98 F | DIASTOLIC BLOOD PRESSURE: 78 MMHG

## 2024-04-15 DIAGNOSIS — I73.9 PAD (PERIPHERAL ARTERY DISEASE) (HCC): ICD-10-CM

## 2024-04-15 DIAGNOSIS — L97.529 ULCER OF TOE, LEFT, WITH UNSPECIFIED SEVERITY (HCC): ICD-10-CM

## 2024-04-15 DIAGNOSIS — N18.6 ESRD (END STAGE RENAL DISEASE) (HCC): ICD-10-CM

## 2024-04-15 DIAGNOSIS — M79.671 BILATERAL FOOT PAIN: ICD-10-CM

## 2024-04-15 DIAGNOSIS — M79.672 BILATERAL FOOT PAIN: ICD-10-CM

## 2024-04-15 DIAGNOSIS — I96 NECROTIC TOES (HCC): ICD-10-CM

## 2024-04-15 DIAGNOSIS — Z89.421 HISTORY OF PARTIAL RAY AMPUTATION OF SECOND TOE OF RIGHT FOOT (HCC): Primary | ICD-10-CM

## 2024-04-15 PROCEDURE — 99214 OFFICE O/P EST MOD 30 MIN: CPT

## 2024-04-15 NOTE — PROGRESS NOTES
Weekly Wound Education Note    Teaching Provided To: Patient;Family  Training Topics: Dressing;Discharge instructions  Training Method: Demonstration;Explain/Verbal;Written  Training Response: Patient responds and understands        Notes: New wounds noted to left toes today, pay close of attention to offloading, continue to apply betadine and dry gauze to bilateral foot wounds, cover with dry gauze, Change daily

## 2024-04-16 NOTE — PROGRESS NOTES
Subjective   Maggie Pelletier is a 78 year old female.    Chief Complaint   Patient presents with    Wound Care     Patient arrives for a wound care follow up visit. Patient arrives with bilateral surgical shoes. Patient states she feels very weak from her Eliquis. Patient reports she has severe pain.        HPI  Maggie Pelletier is a 78 year old female with end-stage renal disease on dialysis, PAD, and CLL who is status post right second metatarsal head resection with excision of right foot ulcerations with primary closure (DOS: 2/15/2024).  Patient states that she is still feeling very fatigued.  She continues to relate this to starting Eliquis.  She states that she has continued to drink at least 1 boost a day, but continues to have no appetite and is nauseous at times.  Patient states that she is continuing to have bilateral foot pain as well.  Patient also states that she has new blisters to the tips of her left second and third digits.  She is keeping her feet covered with Betadine and dry dressings.  She arrives in a wheelchair today.  Patient states that she will be seeing Dr. Addison after her visit today to discuss arterial ultrasound results and further recommendations.  No other concerns at this time.    Review of Systems  Denies nausea, vomiting, fever, chills, shortness of breath, chest pain, and calf pain.    Objective    Physical Exam:    Derm:  Wound Assessment  Wound 02/19/24 #3 Left Fifth Toe Left (Active)   Wound Image   04/15/24 1519   Drainage Amount None 04/15/24 1519   Drainage Description Serous;Yellow 03/18/24 1445   Treatments Betadine 04/08/24 1513   Wound Length (cm) 0.5 cm 04/15/24 1519   Wound Width (cm) 0.3 cm 04/15/24 1519   Wound Surface Area (cm^2) 0.15 cm^2 04/15/24 1519   Wound Depth (cm) 0.1 cm 04/15/24 1519   Wound Volume (cm^3) 0.015 cm^3 04/15/24 1519   Wound Healing % -1400 04/15/24 1519   Margins Well-defined edges 04/15/24 1519   Non-staged Wound Description Full  thickness 04/15/24 1519   Mya-wound Assessment Dry;Edema 04/15/24 1519   Wound Granulation Tissue Pale Grey;Pink 02/19/24 1508   Wound Bed Granulation (%) 100 % 02/19/24 1508   Wound Bed Slough (%) 100 % 03/18/24 1445   Wound Bed Eschar (%) 100 % 04/15/24 1519   Wound Odor None 04/15/24 1519   Shape 100% scabbed 03/11/24 1501   Tunneling? No 04/15/24 1519   Undermining? No 04/15/24 1519   Sinus Tracts? No 04/15/24 1519       Wound 03/04/24 #6 Right 3rd toe Toe (Comment which one) (Active)   Wound Image   04/15/24 1518   Drainage Amount Small 04/15/24 1518   Drainage Description Serous;Yellow 04/15/24 1518   Wound Length (cm) 3.5 cm 04/15/24 1518   Wound Width (cm) 0.7 cm 04/15/24 1518   Wound Surface Area (cm^2) 2.45 cm^2 04/15/24 1518   Wound Depth (cm) 0.2 cm 04/15/24 1518   Wound Volume (cm^3) 0.49 cm^3 04/15/24 1518   Wound Healing % -218 04/15/24 1518   Margins Well-defined edges;Epibole (Rolled edges) 04/15/24 1518   Non-staged Wound Description Full thickness 04/15/24 1518   Mya-wound Assessment Edema;Dry 04/15/24 1518   Wound Granulation Tissue Pink;Spongy 04/08/24 1513   Wound Bed Granulation (%) 10 % 04/08/24 1513   Wound Bed Epithelium (%) 60 % 03/04/24 1558   Wound Bed Slough (%) 100 % 04/15/24 1518   Wound Odor None 04/15/24 1518   Tunneling? No 04/15/24 1518   Undermining? No 04/15/24 1518   Sinus Tracts? No 04/15/24 1518       Wound 03/21/24 Leg Anterior;Proximal;Right;Upper (Active)       Wound 04/15/24 #7 left 2nd toe (Active)   Drainage Amount None 04/15/24 1549   Wound Length (cm) 0.5 cm 04/15/24 1549   Wound Width (cm) 0.7 cm 04/15/24 1549   Wound Surface Area (cm^2) 0.35 cm^2 04/15/24 1549   Wound Depth (cm) 0 cm 04/15/24 1549   Wound Volume (cm^3) 0 cm^3 04/15/24 1549   Margins Well-defined edges 04/15/24 1549   Non-staged Wound Description Full thickness 04/15/24 1549   Mya-wound Assessment Intact;Dark edges 04/15/24 1549   Wound Bed Epithelium (%) 100 % 04/15/24 1549   Shape dark intact  blister 04/15/24 1549       Wound 04/15/24 #8 left 3rd toe (Active)   Drainage Amount None 04/15/24 1550   Wound Length (cm) 1.3 cm 04/15/24 1550   Wound Width (cm) 1.4 cm 04/15/24 1550   Wound Surface Area (cm^2) 1.82 cm^2 04/15/24 1550   Wound Depth (cm) 0 cm 04/15/24 1550   Wound Volume (cm^3) 0 cm^3 04/15/24 1550   Margins Attached edges 04/15/24 1550   Non-staged Wound Description Full thickness 04/15/24 1550   Mya-wound Assessment Intact 04/15/24 1550   Wound Bed Epithelium (%) 100 % 04/15/24 1550   Shape intact dark blister 04/15/24 1550       Integument: Right third digit is continuing to appear necrotic at its distal end.  No current signs of acute infection.  New sanguinous bulla noted to distal tip of left second and third digits that do not appear acutely infected.  Bilateral forefeet are cool compared to midfoot and rear foot  Vascular: pedal pulses are nonpalpable bilaterally. CFT roughly 5 sec to digits.  Minimal CFT to the left fifth digit with continued duskiness noted.  No CFT to right third digit due to gangrenous/necrotic changes  Musculoskeletal: no acute deformities noted.  Status post right second metatarsal head resection.  Pain with palpation to forefeet.  Neurological: Gross sensation intact via light touch.  Protective sensation diminished via Ipswitch test.    Vital Signs  Vital Signs    04/15/24 1520 04/15/24 1523 04/15/24 1612   BP: (!) 79/31 (!) 84/35 139/78   Pulse: 81 79 78   Resp: 17  17   Temp: 98.4 °F (36.9 °C)     PainSc: 8 - (Severe)           Allergies  Allergies   Allergen Reactions    Epinephrine OTHER (SEE COMMENTS)     Dental epi. Pain for 2 weeks after dental procedure.       Assessment    Encounter Diagnosis  1. History of partial ray amputation of second toe of right foot ([facility])    2. Necrotic toes ([facility])    3. Ulcer of toe, left, with unspecified severity ([facility])    4. PAD (peripheral artery disease) ([facility])    5. ESRD (end stage renal disease) ([facility])    6. Bilateral foot  pain        Problem List  Patient Active Problem List   Diagnosis    Gangrene of toe of right foot ([facility]CC)    Ischemic pain of foot, right    PAD (peripheral artery disease) ([facility])    Great toe amputation status, right    Gangrene of right foot ([facility])    ESRD (end stage renal disease) ([facility])    Primary hypertension    Anemia in ESRD (end-stage renal disease) ([facility])    Carotid stenosis, right    Osteomyelitis of right foot ([facility]CC)    Ulcer of right foot, unspecified ulcer stage ([facility]CC)    CLL (chronic lymphocytic leukemia) ([facility]CC)    Status post amputation of lesser toe of right foot (HCC)    Gangrene of toe of left foot ([facility])    Right foot ulcer, with necrosis of bone (HCC)    Coronary artery disease involving native heart without angina pectoris    Orthostatic hypotension       Plan  Orders:  No orders of the defined types were placed in this encounter.        -Patient examined, chart history reviewed.    -Patient is status post right second metatarsal head resection with excision of dorsal and plantar ulcerations of the right foot with primary closure     Examined surgical site today--continued fibrous slough wound base noted to dorsal incision site.  There remains an area of opening near the base of the third digit.  Tip of the third digit is continuing to appear necrotic.  Plantar foot incision does has a slight opening today but does not appear deep.       Examined left foot--left fifth digit eschar does appear increased in size today.  No current signs of infection.  Cyanotic changes continued to the left fifth digit.  New sanguinous bolus noted to distal second and third digits with no current surrounding erythema, current drainage, or other signs of infection.     All wounds cleansed today and covered with Betadine soaked gauze and a dry dressing.  Will have patient change her dressings on a daily basis.  Our goal is to prevent any infections to bilateral feet.     Discussed with patient and her daughter today that she  will most likely need further amputation of the right foot as her previous incision sites are failing to heal.  She knows she is at high risk of not healing.  Patient has an appointment Dr. Addison today following her appointment with me to discuss recent arterial ultrasound results.  Dr. Addison has advised to hold off on any procedures until further evaluation of blood flow is performed.  Will have further discussion with patient and her family following vascular recommendations     Patient also states that she has been having ongoing nausea and vomiting and is having trouble with caloric and protein intake.  Patient provided with healthy recommendations to increase caloric and protein intake.  Patient states that she has just been drinking protein drinks (Boost) daily.  Patient overall continues to look fatigued.      -Educated on signs of infection and encouraged patient to seek immediate medical attention if noticing any of these signs.    Follow-Up  1 week to discuss further recommendations    Kev Ba DPM    4/15/2024    Dragon speech recognition software was used to prepare this note.  Errors in word recognition may occur.  Please contact me with any questions/concerns with this note.

## 2024-04-19 NOTE — TELEPHONE ENCOUNTER
Talked to daughter Heather, states Ambar is at [facility] for internal bleeding, wants to cancel 4/22 appt.

## 2024-04-22 ENCOUNTER — APPOINTMENT (OUTPATIENT)
Dept: WOUND CARE | Facility: HOSPITAL | Age: 79
End: 2024-04-22
Attending: NURSE PRACTITIONER
Payer: MEDICARE

## 2024-04-29 ENCOUNTER — APPOINTMENT (OUTPATIENT)
Dept: WOUND CARE | Facility: HOSPITAL | Age: 79
End: 2024-04-29
Attending: NURSE PRACTITIONER
Payer: MEDICARE

## 2024-05-28 ENCOUNTER — TELEPHONE (OUTPATIENT)
Facility: LOCATION | Age: 79
End: 2024-05-28

## 2024-05-28 NOTE — TELEPHONE ENCOUNTER
Patient daughter calling to inform doctor that the patient has passed away. Patient passed away last week. Please advise.

## (undated) DEVICE — 3M™ STERI-STRIP™ REINFORCED ADHESIVE SKIN CLOSURES, R1547, 1/2 IN X 4 IN (12 MM X 100 MM), 6 STRIPS/ENVELOPE: Brand: 3M™ STERI-STRIP™

## (undated) DEVICE — FLEXOR, CHECK-FLO, INTRODUCER ANSEL 1 MODIFICATION - WITH HIGH-FLEX DILATOR AND HYDROPHILIC COATING: Brand: FLEXOR

## (undated) DEVICE — SPONGE GZ 4XL4IN 100% COT 12 PLY TYP VII WVN

## (undated) DEVICE — TRANSPOSAL ULTRAFLEX DUO/QUAD ULTRA CART MANIFOLD

## (undated) DEVICE — BANDAGE,GAUZE,BULKEE II,4.5"X4.1YD,STRL: Brand: MEDLINE

## (undated) DEVICE — DISPOSABLE TOURNIQUET CUFF SINGLE BLADDER, DUAL PORT AND QUICK CONNECT CONNECTOR: Brand: COLOR CUFF

## (undated) DEVICE — SUTURE PROLENE 7-0 BV-1

## (undated) DEVICE — Device

## (undated) DEVICE — ANGIO CATH 16GX2

## (undated) DEVICE — TOWEL SURG OR 17X30IN BLUE

## (undated) DEVICE — NEEDLE HYPO 22X1-1/2

## (undated) DEVICE — RADIFOCUS GLIDEWIRE ADVANTAGE GUIDEWIRE: Brand: GLIDEWIRE ADVANTAGE

## (undated) DEVICE — BANDAGE ELASTIC ACE 6" STERILE

## (undated) DEVICE — SUTURE PROLENE 6-0 C-1

## (undated) DEVICE — PACK PBDS LOWER EXTREMITY

## (undated) DEVICE — TOURNIQUET CUFF 18 SINGLE

## (undated) DEVICE — SUTURE VICRYL 2-0 CT-1

## (undated) DEVICE — FIXED CORE WIRE GUIDE SAFE-T-J, CURVED: Brand: COOK

## (undated) DEVICE — SOLUTION IRRIG 1000ML 0.9% NACL USP BTL

## (undated) DEVICE — STRETCH BANDAGE: Brand: CURITY

## (undated) DEVICE — RADIFOCUS GLIDECATH: Brand: GLIDECATH

## (undated) DEVICE — CV PACK-LF: Brand: MEDLINE INDUSTRIES, INC.

## (undated) DEVICE — SUTURE VICRYL 3-0 PS-1

## (undated) DEVICE — MARKER SKIN 2 TIP

## (undated) DEVICE — SOL  .9 1000ML BTL

## (undated) DEVICE — LOWER EXTREMITY CDS-LF: Brand: MEDLINE INDUSTRIES, INC.

## (undated) DEVICE — SUTURE ETHLN SZ 3-0 L18IN NONABSORBABLE BLK

## (undated) DEVICE — SUTURE VICRYL 3-0 CT-1

## (undated) DEVICE — STERILE POLYISOPRENE POWDER-FREE SURGICAL GLOVES: Brand: PROTEXIS

## (undated) DEVICE — GEL US 20 GM PKT ST AQSNC 100

## (undated) DEVICE — SUT VCRL 3-0 27IN PS-1 ABSRB UD L24MM 3/8 CIR

## (undated) DEVICE — SOLUTION PREP 4OZ 10% POVIDONE IOD SCR TOP

## (undated) DEVICE — PTA BALLOON DILATATION CATHETER: Brand: STERLING™

## (undated) DEVICE — PINNACLE INTRODUCER SHEATH: Brand: PINNACLE

## (undated) DEVICE — OCCLUSIVE GAUZE STRIP OVERWRAP,3% BISMUTH TRIBROMOPHENATE IN PETROLATUM BLEND: Brand: XEROFORM

## (undated) DEVICE — KIT INFL DEV 20ML W/ INSRT TOOL 3 W STPCOCK

## (undated) DEVICE — DRAPE WARMER ORS-100

## (undated) DEVICE — SUTURE POLYDEK 4-0 TIES 6-932

## (undated) DEVICE — 3M™ BAIR HUGGER® UNDERBODY BLANKET, FULL ACCESS, 10 PER CASE 63500: Brand: BAIR HUGGER™

## (undated) DEVICE — GAUZE SPONGES,12 PLY: Brand: CURITY

## (undated) DEVICE — SYRINGE 10ML LL TIP

## (undated) DEVICE — CHLORAPREP 26ML APPLICATOR

## (undated) DEVICE — STANDARD HYPODERMIC NEEDLE,POLYPROPYLENE HUB: Brand: MONOJECT

## (undated) DEVICE — PTA BALLOON DILATATION CATHETER: Brand: COYOTE™

## (undated) DEVICE — CLIP LIG M BLU TI HRT SHP WRE HORZ 600 PER BX

## (undated) DEVICE — UNDYED BRAIDED (POLYGLACTIN 910), SYNTHETIC ABSORBABLE SUTURE: Brand: COATED VICRYL

## (undated) DEVICE — SUTURE ETHIBOND EXCEL 3-0 SH

## (undated) DEVICE — GOWN SUR 2XL LEV 4 BLU W/ WHT V NK BND AERO

## (undated) DEVICE — SUT PROL 7-0 24IN CC NABSRB BLU L9.3MM 3/8 CI

## (undated) DEVICE — SUT ETHBND XL 3-0 30IN SH NABSRB GRN 26MM 1/2

## (undated) DEVICE — SOL  .9 500ML

## (undated) DEVICE — GEL AQUASONIC 100 20GR

## (undated) DEVICE — 3M™ TEGADERM™ TRANSPARENT FILM DRESSING, 1626W, 4 IN X 4-3/4 IN (10 CM X 12 CM), 50 EACH/CARTON, 4 CARTON/CASE: Brand: 3M™ TEGADERM™

## (undated) DEVICE — Device: Brand: INTELLICART™

## (undated) DEVICE — Device: Brand: CORSAIR PRO

## (undated) DEVICE — SUTURE PROL 5-0 24IN NABSRB BLU 13MM C-1 3/8

## (undated) DEVICE — SUT VCRL 2-0 36IN CT-1 ABSRB UD L36MM 1/2 CIR

## (undated) DEVICE — SUTURE PROLENE 5-0 C-1

## (undated) DEVICE — FLOSEAL HEMOSTATIC MATRIX, 5ML: Brand: FLOSEAL HEMOSTATIC MATRIX

## (undated) DEVICE — HEMOCLIP MED 24 CLIP/CARTRIDGE

## (undated) DEVICE — GLOVE SUR 7.5 PROTEXIS PI PIP CRM PWD F

## (undated) DEVICE — SUT PROL 6-0 24IN C-1 NABSRB BLU 13MM 3/8 CIR

## (undated) DEVICE — F5 TEMPO 65 CM 0.038 INCHESGW: Brand: TEMPO

## (undated) DEVICE — STERILE HOOK LOCK LATEX FREE ELASTIC BANDAGE 6INX5YD: Brand: HOOK LOCK™

## (undated) DEVICE — SLEEVE COMPR M KNEE LEN SGL USE KENDALL SCD

## (undated) DEVICE — BARD® JAVID™ CAROTID BYPASS SHUNT, 17F - 10F X 27.5CM: Brand: BARD® JAVID

## (undated) DEVICE — BANDAGE COHESIVE W4INXL5YD TAN E POR SLF ADH

## (undated) DEVICE — ANGIO PACK-LF: Brand: MEDLINE INDUSTRIES, INC.

## (undated) DEVICE — 3M™ IOBAN™ 2 ANTIMICROBIAL INCISE DRAPE 6651EZ: Brand: IOBAN™ 2

## (undated) DEVICE — CATH RED RUBBER 20FR

## (undated) DEVICE — 3M™ IOBAN™ 2 ANTIMICROBIAL INCISE DRAPE 6650EZ: Brand: IOBAN™ 2

## (undated) DEVICE — PTA BALLOON DILATATION CATHETER: Brand: MUSTANG™

## (undated) DEVICE — SOLUTION IV 1000ML 0.9% NACL PRESERVATIVE

## (undated) DEVICE — Device: Brand: ASAHI GLADIUS18

## (undated) DEVICE — EXOFIN TISSUE ADHESIVE 1.0ML

## (undated) DEVICE — TRAY CATH 16FR F INCL BARDX IC COMPLT CARE

## (undated) DEVICE — RUNTHROUGH NS EXTRA FLOPPY PTCA GUIDEWIRE: Brand: RUNTHROUGH

## (undated) DEVICE — RADIFOCUS GLIDEWIRE: Brand: GLIDEWIRE

## (undated) DEVICE — NAVICROSS SUPPORT CATHETER: Brand: NAVICROSS

## (undated) DEVICE — SOLUTION IV 500ML 0.9% NACL FLX CONT

## (undated) NOTE — LETTER
Lyndy Glee A. Melvinia Rubinstein, M.D., F.A.C.S. Madhu Leger M.D., F.A.C.S. Asim Coffey M.D., Antoinette Park M.D., F.A.C.SKenny Marie. Kaci Marroquin M.D., F.A.C.S. Kaiden Richter M.D. ADENIKE Haddad M.D., F.A.C.S. Joana Rowell. Eve Mckenzie M.D., F.A.C.S. Terry Fallon M.D., F.A.C.S. Nahid Ambrocio M.D., F.A.C.S. August Camacho M.D. F.A.C.S. Krystyna Brock. Carmelita Shoemaker M.D., F.A.C.S. Kelby Perez M.D., F.A.C.S. Rome Howard M.D., F.A.C.S., F.A.C.CKenny Smith M.D., F.A.C.S. Oleg Mercado M.D., F.A.C.S. Destiney Mills M.D., F.A.C.S. Diomedes Martinez M.D., F.A.C.S. DILLAN Vila M.D., Antoinette Green M.D. Estefania Ware M.D., F.A.C.S. Chun Jones. Rain Kurtz M.D., F.A.C.S. Alesha Ramirez M.D. Aletha Alarcon M.D.  Belkis Jha M.D. PhD.  Sandra Palma M.D. Gurvinder Flower M.D. F A KRISTIAN Gonzalez. Froy Javed M.D. Destinee Luna M.D. Delano Coreas M.D. Darnell Cobb M.D.   Kathleen Baig D.O., CARINE. Koffi Muñoz M.D., F.A.CARLI. Lesa Hartley M.D. Welcome to [facility] As you contemplate possible surgical treatment, it is very important to us that you understand fully what is being discussed, that all of your questions have been answered, and that your options for treatment have been fully explained. To that end, on the following page we will ask you some questions to make certain that you understand everything which has been explained to you. Included in this understanding is that there are both surgical and nonsurgical treatments available for you, that you have options regarding where your care is given, and what doctors are involved in your care. Included in these options would, of course, be the option to elect for no treatment whatsoever.  We especially want to be sure that you have had a chance to have all of your questions and concerns answered. If there are any issues which have not been adequately addressed, we ask you to bring them forward so that we can thoroughly address them. A patient who is fully informed and understands their condition and options for treatment, as well as potential adverse effects of treatment, is going to be a patient who receives the most benefit out of care rendered. Our goal in addition to providing excellent surgical care is to provide the necessary information to you and your family in order to make decisions which are appropriate relative to your own care. Please take the time necessary to read and answer the questions on the next page. Again, if you have any questions, bring them forward and we will certainly address them. Sincerely,    [facility].    _______________________  ____________________________________  Date:                                             Patient Signature  ________________________  Genevieve Charles  Witness Consent Form         Revised: 2015  Patient Name: Genevieve Charles     : 1945                 Printed: 6/15/13 9:43 AM     Medical Record #: [id number]                Page: 1 of  2        [facility] Supplemental Consent Form    A [facility] surgeon has met with me and explained the matter of my illness, and what treatments might be available to improve my condition. As a result of that conversation, I understand the following:    A [facility] surgeon met with me and explained, in detail, the nature of my condition for which surgery is being contemplated.  The procedure to be performed  Is: RIGHT CAROTID ENDARTERECTOMY WITH VEIN PATCH            Yes _____ No _____    A [facility] surgeon has explained to me that there are alternatives to surgery which might include no surgery, medical therapy, or interventional treatment, among other options and the risks and benefits of the different treatment options:    Yes _____ No _____    A [facility] surgeon as explained to me that if I should so desire, he/she is willing to explain my case and the surgical and non-surgical options to family members: Yes _____ No _____    A [facility] surgeon has answered all of my questions regarding the topics we have discussed. I have been invited to ask more questions:  Yes _____ No _____    A [facility] surgeon has explained to me that if I seek other options or wish treatment at another facility in [place] or [place], or anywhere in the [place], that his/her office will assist me in making such accommodations:   Yes _____ No _____    A [facility] surgeon has explained to me, that death, risk of bleeding, stroke, multi-organ failure, heart attack or other complications are risks for the proposed surgical procedure: Yes _____ No _____    A [facility] surgeon has explained to me that I have the right to cancel or postpone the surgery at any time prior to the start of surgery: Yes _____ No _____    The nature and options for treatment for my condition have been explained to me, in detail, by a [facility] surgeon and all questions have been answered to my satisfaction. I understand that I am not required to undergo surgery, and further, that if I so desire, I could have surgery accomplished by another surgeon or at another institution. I understand and accept that which has been explained to me.  I am able to make my decisions knowingly and willfully based on the data.    ______________________   __________________________________  Date       Patient Signature  ______________________________ Genevieve Melvin  Witness Consent Form   Patient Name: Aileen Paige: 6/2/1945                 Medical Record #: [id number]    Page: 2 of 2        Printed: March 4, 2022

## (undated) NOTE — LETTER
[address]  [address]  Consent for Procedure/Sedation  Date: ***         Time: ***    I hereby authorize ***, my physician and his/her assistants (if applicable), which may include medical students, residents, and/or fellows, to perform the following surgical operation/ procedure and administer such anesthesia as may be determined necessary by my physician:  Operation/Procedure name (s) Peripheral angiography, atherectomy, percutaneous transluminal angioplasty (PTA) and/or vascular stenting on Maggie Pelletier  2.   I recognize that during the surgical operation/procedure, unforeseen conditions may necessitate additional or different procedures than those listed above.  I, therefore, further authorize and request that the above-named surgeon, assistants, or designees perform such procedures as are, in their judgment, necessary and desirable.    3.   My surgeon/physician has discussed prior to my surgery the potential benefits, risks and side effects of this procedure; the likelihood of achieving goals; and potential problems that might occur during recuperation.  They also discussed reasonable alternatives to the procedure, including risks, benefits, and side effects related to the alternatives and risks related to not receiving this procedure.  I have had all my questions answered and I acknowledge that no guarantee has been made as to the result that may be obtained.    4.   Should the need arise during my operation/procedure, which includes change of level of care prior to discharge, I also consent to the administration of blood and/or blood products.  Further, I understand that despite careful testing and screening of blood or blood products by collecting agencies, I may still be subject to ill effects as a result of receiving a blood transfusion and/or blood products.  The following are some, but not all, of the potential risks that can occur: fever and allergic  reactions, hemolytic reactions, transmission of diseases such as Hepatitis, AIDS and Cytomegalovirus (CMV) and fluid overload.  In the event that I wish to have an autologous transfusion of my own blood, or a directed donor transfusion, I will discuss this with my physician.     Check only if Refusing Blood or Blood Products  I understand refusal of blood or blood products as deemed necessary by my physician may have serious consequences to my condition to include possible death. I hereby assume responsibility for my refusal and release the hospital, its personnel, and my physicians from any responsibility for the consequences of my refusal.      o  Refuse        5.   I authorize the use of any specimen, organs, tissues, body parts or foreign objects that may be removed from my body during the operation/procedure for diagnosis, research or teaching purposes and their subsequent disposal by hospital authorities.  I also authorize the release of specimen test results and/or written reports to my treating physician on the hospital medical staff or other referring or consulting physicians involved in my care, at the discretion of the Pathologist or my treating physician.    6.   I consent to the photographing or videotaping of the operations or procedures to be performed, including appropriate portions of my body for medical, scientific, or educational purposes, provided my identity is not revealed by the pictures or by descriptive texts accompanying them.  If the procedure has been photographed/videotaped, the surgeon will obtain the original picture, image, videotape or CD.  The hospital will not be responsible for storage, release or maintenance of the picture, image, tape or CD.    7.   I consent to the presence of a  or observers in the operating room as deemed necessary by my physician or their designees.    8.   I recognize that in the event my procedure results in extended X-Ray/fluoroscopy  time, I may develop a skin reaction.    9. If I have a Do Not Attempt Resuscitation (DNAR) order in place, that status will be suspended while in the operating room, procedural suite, and during the recovery period unless otherwise explicitly stated by me (or a person authorized to consent on my behalf). The surgeon or my attending physician will determine when the applicable recovery period ends for purposes of reinstating the DNAR order.  10. Patients having a sterilization procedure: I understand that if the procedure is successful the results will be permanent and it will therefore be impossible for me to inseminate, conceive, or bear children.  I also understand that the procedure is intended to result in sterility, although the result has not been guaranteed.   11. I acknowledge that my physician has explained sedation/analgesia administration to me including the risk and benefits I consent to the administration of sedation/analgesia as may be necessary or desirable in the judgment of my physician.    I CERTIFY THAT I HAVE READ AND FULLY UNDERSTAND THE ABOVE CONSENT TO OPERATION and/or OTHER PROCEDURE.      ____________________________________       _________________________________      ______________________________  Signature of Patient         Signature of Responsible Person        Printed Name of Responsible Person    ____________________________________      _________________________________      ______________________________       Signature of Witness          Relationship to Patient                       Date                                         Time  Patient Name: Maggie Pelletier  : 1945    Reviewed: 2024   Printed: 2024  Medical Record #: [id number] Page 1 of 1

## (undated) NOTE — LETTER
[facility]  [address] [facility] [address]  [phone]    Blood Transfusion Consent    In the course of your treatment, it may become necessary to administer a transfusion of blood or blood components. This form provides basic information concerning this procedure and, if signed by you, authorizes its administration. By signing this form, you agree that all of your questions about the administration of blood or blood products have been answered by the ordering medical professional or designee.    Description of Procedure  Blood is introduced into one of your veins, commonly in the arm, using a sterilized disposable needle. The amount of blood transfused, and whether the transfusion will be of blood or blood components is a judgement the physician will make based on your particular needs.    Risks  The transfusion is a common procedure of low risk.  MINOR AND TEMPORARY REACTIONS ARE NOT UNCOMMON, including a slight bruise, swelling or local reaction in the area where the needle pierces your skin, or a nonserious reaction to the transfused material itself, including headache, fever or mild skin reaction, such as rash.  Serious reactions are possible, though very unlikely, and include severe allergic reaction (shock) and destruction (hemolysis) of transfused blood cells.  Infectious diseases which are known to be transmitted by blood transfusion include certain types of viral Hepatitis(liver infection from a virus), Human Immunodeficiency Virus (HIV-1,2) infection, a viral infection known to cause Acquired Immunodeficiency Syndrome (AIDS), as well as certain other bacterial, viral, and parasitic diseases. While a minimal risk of acquiring an infectious disease from transfused blood exists, in accordance with the Federal and State law, all due care has been taken in donor selection and testing to avoid transmission of disease.    Alternatives  If loss of blood poses serious threats during your  treatment, THERE IS NO EFFECTIVE ALTERNATIVE TO BLOOD TRANSFUSION. However, if you have any further questions on this matter, your provider will fully explain the alternatives to you if it has not already been done.    I, ______________________________, have read/had read to me the above. I understand the matters bearing on the decision whether or not to authorize a transfusion of blood or blood components. I have no questions which have not been answered to my full satisfaction. I hereby consent to such transfusion as my physician may deem necessary or advisable in the course of my treatment.    ______________________________________________                    ___________________________  (Signature of Patient or Responsible party in case of minor,                 (Printed Name of Patient or incompetent, or unconscious patient)              Responsible Party)    ___________________________               _____________________  (Relationship to Patient if not self)                                    (Date and Time)    __________________________                                                           ______________________              (Signature of Witness)               (Printed Name of Witness)     Language line ()    Telephone/Verbal/Video Consent    __________________________                     ____________________  (Signature of 2nd Witness           (Printed Name of 2nd  Telephone/Verbal/Video Consent)           Witness)    Patient Name: Maggie Pelletier     : 1945                 Printed: 2024     Medical Record #: [id number]      Rev: 2023

## (undated) NOTE — LETTER
[address]  [address]  Consent for Procedure/Sedation  Date: 03/21/24         Time: 1831    I hereby authorize Moon Addison/ Dr Valenzuela , my physician and his/her assistants (if applicable), which may include medical students, residents, and/or fellows, to perform the following surgical operation/ procedure and administer such anesthesia as may be determined necessary by my physician:  Operation/Procedure name (s) Right leg angiogram/ possible PTA/stent/ possible open exposure:   2.   I recognize that during the surgical operation/procedure, unforeseen conditions may necessitate additional or different procedures than those listed above.  I, therefore, further authorize and request that the above-named surgeon, assistants, or designees perform such procedures as are, in their judgment, necessary and desirable.    3.   My surgeon/physician has discussed prior to my surgery the potential benefits, risks and side effects of this procedure; the likelihood of achieving goals; and potential problems that might occur during recuperation.  They also discussed reasonable alternatives to the procedure, including risks, benefits, and side effects related to the alternatives and risks related to not receiving this procedure.  I have had all my questions answered and I acknowledge that no guarantee has been made as to the result that may be obtained.    4.   Should the need arise during my operation/procedure, which includes change of level of care prior to discharge, I also consent to the administration of blood and/or blood products.  Further, I understand that despite careful testing and screening of blood or blood products by collecting agencies, I may still be subject to ill effects as a result of receiving a blood transfusion and/or blood products.  The following are some, but not all, of the potential risks that can occur: fever and allergic reactions, hemolytic reactions, transmission  of diseases such as Hepatitis, AIDS and Cytomegalovirus (CMV) and fluid overload.  In the event that I wish to have an autologous transfusion of my own blood, or a directed donor transfusion, I will discuss this with my physician.     Check only if Refusing Blood or Blood Products  I understand refusal of blood or blood products as deemed necessary by my physician may have serious consequences to my condition to include possible death. I hereby assume responsibility for my refusal and release the hospital, its personnel, and my physicians from any responsibility for the consequences of my refusal.      o  Refuse        5.   I authorize the use of any specimen, organs, tissues, body parts or foreign objects that may be removed from my body during the operation/procedure for diagnosis, research or teaching purposes and their subsequent disposal by hospital authorities.  I also authorize the release of specimen test results and/or written reports to my treating physician on the hospital medical staff or other referring or consulting physicians involved in my care, at the discretion of the Pathologist or my treating physician.    6.   I consent to the photographing or videotaping of the operations or procedures to be performed, including appropriate portions of my body for medical, scientific, or educational purposes, provided my identity is not revealed by the pictures or by descriptive texts accompanying them.  If the procedure has been photographed/videotaped, the surgeon will obtain the original picture, image, videotape or CD.  The hospital will not be responsible for storage, release or maintenance of the picture, image, tape or CD.    7.   I consent to the presence of a  or observers in the operating room as deemed necessary by my physician or their designees.    8.   I recognize that in the event my procedure results in extended X-Ray/fluoroscopy time, I may develop a skin reaction.    9. If I  have a Do Not Attempt Resuscitation (DNAR) order in place, that status will be suspended while in the operating room, procedural suite, and during the recovery period unless otherwise explicitly stated by me (or a person authorized to consent on my behalf). The surgeon or my attending physician will determine when the applicable recovery period ends for purposes of reinstating the DNAR order.  10. Patients having a sterilization procedure: I understand that if the procedure is successful the results will be permanent and it will therefore be impossible for me to inseminate, conceive, or bear children.  I also understand that the procedure is intended to result in sterility, although the result has not been guaranteed.   11. I acknowledge that my physician has explained sedation/analgesia administration to me including the risk and benefits I consent to the administration of sedation/analgesia as may be necessary or desirable in the judgment of my physician.    I CERTIFY THAT I HAVE READ AND FULLY UNDERSTAND THE ABOVE CONSENT TO OPERATION and/or OTHER PROCEDURE.      ____________________________________       _________________________________      ______________________________  Signature of Patient         Signature of Responsible Person        Printed Name of Responsible Person    ____________________________________      _________________________________      ______________________________       Signature of Witness          Relationship to Patient                       Date                                         Time  Patient Name: Maggie Pelletier  : 1945    Reviewed: 2024   Printed: 2024  Medical Record #: [id number] Page 1 of 1

## (undated) NOTE — LETTER
[address]  [address]  Authorization for Surgical Operation and Procedure     Date:___________                                                                                                         Time:__________  I hereby authorize Surgeon(s):  Colten Ba DPM, my physician and his/her assistants (if applicable), which may include medical students, residents, and/or fellows, to perform the following surgical operation/ procedure and administer such anesthesia as may be determined necessary by my physician:  Operation/Procedure name (s) Procedure(s):  RIGHT 2ND METATARSAL HEAD RESECTION, EXCISION OF ULCERATIONS WITH PRIMARY CLOSURE OF RIGHT FOOT on Maggie Pelletier   2.   I recognize that during the surgical operation/procedure, unforeseen conditions may necessitate additional or different procedures than those listed above.  I, therefore, further authorize and request that the above-named surgeon, assistants, or designees perform such procedures as are, in their judgment, necessary and desirable.    3.   My surgeon/physician has discussed prior to my surgery the potential benefits, risks and side effects of this procedure; the likelihood of achieving goals; and potential problems that might occur during recuperation.  They also discussed reasonable alternatives to the procedure, including risks, benefits, and side effects related to the alternatives and risks related to not receiving this procedure.  I have had all my questions answered and I acknowledge that no guarantee has been made as to the result that may be obtained.    4.   Should the need arise during my operation/procedure, which includes change of level of care prior to discharge, I also consent to the administration of blood and/or blood products.  Further, I understand that despite careful testing and screening of blood or blood products by collecting agencies, I may still be subject to ill effects as a  result of receiving a blood transfusion and/or blood products.  The following are some, but not all, of the potential risks that can occur: fever and allergic reactions, hemolytic reactions, transmission of diseases such as Hepatitis, AIDS and Cytomegalovirus (CMV) and fluid overload.  In the event that I wish to have an autologous transfusion of my own blood, or a directed donor transfusion, I will discuss this with my physician.  Check only if Refusing Blood or Blood Products  I understand refusal of blood or blood products as deemed necessary by my physician may have serious consequences to my condition to include possible death. I hereby assume responsibility for my refusal and release the hospital, its personnel, and my physicians from any responsibility for the consequences of my refusal.          o  Refuse      5.   I authorize the use of any specimen, organs, tissues, body parts or foreign objects that may be removed from my body during the operation/procedure for diagnosis, research or teaching purposes and their subsequent disposal by hospital authorities.  I also authorize the release of specimen test results and/or written reports to my treating physician on the hospital medical staff or other referring or consulting physicians involved in my care, at the discretion of the Pathologist or my treating physician.    6.   I consent to the photographing or videotaping of the operations or procedures to be performed, including appropriate portions of my body for medical, scientific, or educational purposes, provided my identity is not revealed by the pictures or by descriptive texts accompanying them.  If the procedure has been photographed/videotaped, the surgeon will obtain the original picture, image, videotape or CD.  The hospital will not be responsible for storage, release or maintenance of the picture, image, tape or CD.    7.   I consent to the presence of a  or observers in the  operating room as deemed necessary by my physician or their designees.    8.   I recognize that in the event my procedure results in extended X-Ray/fluoroscopy time, I may develop a skin reaction.    9. If I have a Do Not Attempt Resuscitation (DNAR) order in place, that status will be suspended while in the operating room, procedural suite, and during the recovery period unless otherwise explicitly stated by me (or a person authorized to consent on my behalf). The surgeon or my attending physician will determine when the applicable recovery period ends for purposes of reinstating the DNAR order.  10. Patients having a sterilization procedure: I understand that if the procedure is successful the results will be permanent and it will therefore be impossible for me to inseminate, conceive, or bear children.  I also understand that the procedure is intended to result in sterility, although the result has not been guaranteed.   11. I acknowledge that my physician has explained sedation/analgesia administration to me including the risk and benefits I consent to the administration of sedation/analgesia as may be necessary or desirable in the judgment of my physician.    I CERTIFY THAT I HAVE READ AND FULLY UNDERSTAND THE ABOVE CONSENT TO OPERATION and/or OTHER PROCEDURE.    _________________________________________  __________________________________  Signature of Patient     Signature of Responsible Person         ___________________________________         Printed Name of Responsible Person           _________________________________                 Relationship to Patient  _________________________________________  ______________________________  Signature of Witness          Date  Time      Patient Name: Maggie Pelletier     : 1945                 Printed: February 15, 2024     Medical Record #: [id number]                     Page 1 of 2                                    [address]  [place]  [address]    Consent for Anesthesia    I, Maggie Pelletier agree to be cared for by an anesthesiologist, who is specially trained to monitor me and give me medicine to put me to sleep or keep me comfortable during my procedure    I understand that my anesthesiologist is not an employee or agent of [facility] or [employer]. He or she works for [employer].    As the patient asking for anesthesia services, I agree to:  Allow the anesthesiologist (anesthesia doctor) to give me medicine and do additional procedures as necessary. Some examples are: Starting or using an “IV” to give me medicine, fluids or blood during my procedure, and having a breathing tube placed to help me breathe when I’m asleep (intubation). In the event that my heart stops working properly, I understand that my anesthesiologist will make every effort to sustain my life, unless otherwise directed by [facility] Do Not Resuscitate documents.  Tell my anesthesia doctor before my procedure:  If I am pregnant.  The last time that I ate or drank.  All of the medicines I take (including prescriptions, herbal supplements, and pills I can buy without a prescription (including street drugs/illegal medications). Failure to inform my anesthesiologist about these medicines may increase my risk of anesthetic complications.  If I am allergic to anything or have had a reaction to anesthesia before.  I understand how the anesthesia medicine will help me (benefits).  I understand that with any type of anesthesia medicine there are risks:  The most common risks are: nausea, vomiting, sore throat, muscle soreness, damage to my eyes, mouth, or teeth (from breathing tube placement).  Rare risks include: remembering what happened during my procedure, allergic reactions to medications, injury to my airway, heart, lungs, vision, nerves, or muscles and in extremely rare instances death.  My doctor has explained to me  other choices available to me for my care (alternatives).  Pregnant Patients (“epidural”):  I understand that the risks of having an epidural (medicine given into my back to help control pain during labor), include itching, low blood pressure, difficulty urinating, headache or slowing of the baby’s heart. Very rare risks include infection, bleeding, seizure, irregular heart rhythms and nerve injury.  Regional Anesthesia (“spinal”, “epidural”, & “nerve blocks”):  I understand that rare but potential complications include headache, bleeding, infection, seizure, irregular heart rhythms, and nerve injury.    I can change my mind about having anesthesia services at any time before I get the medicine.    _____________________________________________________________________________  Patient (or Representative) Signature/Relationship to Patient  Date   Time    _____________________________________________________________________________   Name (if used)    Language/Organization   Time    _____________________________________________________________________________  Anesthesiologist Signature     Date   Time  I have discussed the procedure and information above with the patient (or patient’s representative) and answered their questions. The patient or their representative has agreed to have anesthesia services.    _____________________________________________________________________________  Witness        Date   Time  I have verified that the signature is that of the patient or patient’s representative, and that it was signed before the procedure  Patient Name: Maggie Pelletier     : 1945                 Printed: February 15, 2024     Medical Record #: [id number]                     Page 2 of 2

## (undated) NOTE — LETTER
AUTHORIZATION FOR SURGICAL OPERATION OR OTHER PROCEDURE    1. I hereby authorize Dr. Eric Leger, and [address] [place] [address] staff assigned to my case to perform the following operation and/or procedure at the [address] [place] [address]:    _______________________________________________________________________________________________    Ingrown toenail procedure, left hallux bilateral borders with chemical destruction of the nail roots. _______________________________________________________________________________________________    2. My physician has explained the nature and purpose of the operation or other procedure, possible alternative methods of treatment, the risks involved, and the possibility of complication to me. I acknowledge that no guarantee has been made as to the result that may be obtained. 3.  I recognize that, during the course of this operation, or other procedure, unforseen conditions may necessitate additional or different procedure than those listed above. I, therefore, further authorize and request that the above named physician, his/her physician assistants or designees perform such procedures as are, in his/her professional opinion, necessary and desirable. 4.  Any tissue or organs removed in the operation or other procedure may be disposed of by and at the discretion of the [address] [place] [address] and [facility]. 5.  I understand that in the event of a medical emergency, I will be transported by local paramedics to [facility] or other hospital emergency department. 6.  I certify that I have read and fully understand the above consent to operation and/or other procedure. 7.  I acknowledge that my physician has explained sedation/analgesia administration to me including the risks and benefits. I consent to the administration of sedation/analgesia as may be necessary or desirable in the judgement of my physician.     Witness signature: ___________________________________________________ Date:  ______/______/_____                    Time:  ________ A. M.  P.M. Patient Name:  ______________________________________________________  (please print)      Patient signature:  ___________________________________________________             Relationship to Patient:           []  Parent    Responsible person                          []  Spouse  In case of minor or                    [] Other  _____________   Incompetent name:  __________________________________________________                               (please print)      _____________      Responsible person  In case of minor or  Incompetent signature:  _______________________________________________    Statement of Physician  My signature below affirms that prior to the time of the procedure, I have explained to the patient and/or his/her guardian, the risks and benefits involved in the proposed treatment and any reasonable alternative to the proposed treatment. I have also explained the risks and benefits involved in the refusal of the proposed treatment and have answered the patient's questions.                         Date:  ______/______/_______  Provider                      Signature:  __________________________________________________________       Time:  ___________ MISAEL BROOKE

## (undated) NOTE — LETTER
[address]  [address]  Authorization for Surgical Operation and Procedure     Date:February 15,2024___________                                                                                                         Time:____1000______  I hereby authorize Colten Ba DPM , my physician and his/her assistants (if applicable), which may include medical students, residents, and/or fellows, to perform the following surgical operation/ procedure and administer such anesthesia as may be determined necessary by my physician:  Operation/Procedure name (s) right second metatarsal head resection, excision of right foot ulcerations with primary closure on Maggie Pelletier   2.   I recognize that during the surgical operation/procedure, unforeseen conditions may necessitate additional or different procedures than those listed above.  I, therefore, further authorize and request that the above-named surgeon, assistants, or designees perform such procedures as are, in their judgment, necessary and desirable.    3.   My surgeon/physician has discussed prior to my surgery the potential benefits, risks and side effects of this procedure; the likelihood of achieving goals; and potential problems that might occur during recuperation.  They also discussed reasonable alternatives to the procedure, including risks, benefits, and side effects related to the alternatives and risks related to not receiving this procedure.  I have had all my questions answered and I acknowledge that no guarantee has been made as to the result that may be obtained.    4.   Should the need arise during my operation/procedure, which includes change of level of care prior to discharge, I also consent to the administration of blood and/or blood products.  Further, I understand that despite careful testing and screening of blood or blood products by collecting agencies, I may still be subject to ill effects as a result  of receiving a blood transfusion and/or blood products.  The following are some, but not all, of the potential risks that can occur: fever and allergic reactions, hemolytic reactions, transmission of diseases such as Hepatitis, AIDS and Cytomegalovirus (CMV) and fluid overload.  In the event that I wish to have an autologous transfusion of my own blood, or a directed donor transfusion, I will discuss this with my physician.  Check only if Refusing Blood or Blood Products  I understand refusal of blood or blood products as deemed necessary by my physician may have serious consequences to my condition to include possible death. I hereby assume responsibility for my refusal and release the hospital, its personnel, and my physicians from any responsibility for the consequences of my refusal.          o  Refuse      5.   I authorize the use of any specimen, organs, tissues, body parts or foreign objects that may be removed from my body during the operation/procedure for diagnosis, research or teaching purposes and their subsequent disposal by hospital authorities.  I also authorize the release of specimen test results and/or written reports to my treating physician on the hospital medical staff or other referring or consulting physicians involved in my care, at the discretion of the Pathologist or my treating physician.    6.   I consent to the photographing or videotaping of the operations or procedures to be performed, including appropriate portions of my body for medical, scientific, or educational purposes, provided my identity is not revealed by the pictures or by descriptive texts accompanying them.  If the procedure has been photographed/videotaped, the surgeon will obtain the original picture, image, videotape or CD.  The hospital will not be responsible for storage, release or maintenance of the picture, image, tape or CD.    7.   I consent to the presence of a  or observers in the operating  room as deemed necessary by my physician or their designees.    8.   I recognize that in the event my procedure results in extended X-Ray/fluoroscopy time, I may develop a skin reaction.    9. If I have a Do Not Attempt Resuscitation (DNAR) order in place, that status will be suspended while in the operating room, procedural suite, and during the recovery period unless otherwise explicitly stated by me (or a person authorized to consent on my behalf). The surgeon or my attending physician will determine when the applicable recovery period ends for purposes of reinstating the DNAR order.  10. Patients having a sterilization procedure: I understand that if the procedure is successful the results will be permanent and it will therefore be impossible for me to inseminate, conceive, or bear children.  I also understand that the procedure is intended to result in sterility, although the result has not been guaranteed.   11. I acknowledge that my physician has explained sedation/analgesia administration to me including the risk and benefits I consent to the administration of sedation/analgesia as may be necessary or desirable in the judgment of my physician.    I CERTIFY THAT I HAVE READ AND FULLY UNDERSTAND THE ABOVE CONSENT TO OPERATION and/or OTHER PROCEDURE.    _________________________________________  __________________________________  Signature of Patient     Signature of Responsible Person         ___________________________________         Printed Name of Responsible Person           _________________________________                 Relationship to Patient  _________________________________________  ______________________________  Signature of Witness          Date  Time      Patient Name: Maggie Pelletier     : 1945                 Printed: February 15, 2024     Medical Record #: [id number]                     Page 1 of 2                                    [address]  [place]  [address]    Consent for Anesthesia    I, Maggie Pelletier agree to be cared for by an anesthesiologist, who is specially trained to monitor me and give me medicine to put me to sleep or keep me comfortable during my procedure    I understand that my anesthesiologist is not an employee or agent of [facility] or [employer]. He or she works for [employer].    As the patient asking for anesthesia services, I agree to:  Allow the anesthesiologist (anesthesia doctor) to give me medicine and do additional procedures as necessary. Some examples are: Starting or using an “IV” to give me medicine, fluids or blood during my procedure, and having a breathing tube placed to help me breathe when I’m asleep (intubation). In the event that my heart stops working properly, I understand that my anesthesiologist will make every effort to sustain my life, unless otherwise directed by [facility] Do Not Resuscitate documents.  Tell my anesthesia doctor before my procedure:  If I am pregnant.  The last time that I ate or drank.  All of the medicines I take (including prescriptions, herbal supplements, and pills I can buy without a prescription (including street drugs/illegal medications). Failure to inform my anesthesiologist about these medicines may increase my risk of anesthetic complications.  If I am allergic to anything or have had a reaction to anesthesia before.  I understand how the anesthesia medicine will help me (benefits).  I understand that with any type of anesthesia medicine there are risks:  The most common risks are: nausea, vomiting, sore throat, muscle soreness, damage to my eyes, mouth, or teeth (from breathing tube placement).  Rare risks include: remembering what happened during my procedure, allergic reactions to medications, injury to my airway, heart, lungs, vision, nerves, or muscles and in extremely rare instances death.  My doctor has explained to me  other choices available to me for my care (alternatives).  Pregnant Patients (“epidural”):  I understand that the risks of having an epidural (medicine given into my back to help control pain during labor), include itching, low blood pressure, difficulty urinating, headache or slowing of the baby’s heart. Very rare risks include infection, bleeding, seizure, irregular heart rhythms and nerve injury.  Regional Anesthesia (“spinal”, “epidural”, & “nerve blocks”):  I understand that rare but potential complications include headache, bleeding, infection, seizure, irregular heart rhythms, and nerve injury.    I can change my mind about having anesthesia services at any time before I get the medicine.    _____________________________________________________________________________  Patient (or Representative) Signature/Relationship to Patient  Date   Time    _____________________________________________________________________________   Name (if used)    Language/Organization   Time    _____________________________________________________________________________  Anesthesiologist Signature     Date   Time  I have discussed the procedure and information above with the patient (or patient’s representative) and answered their questions. The patient or their representative has agreed to have anesthesia services.    _____________________________________________________________________________  Witness        Date   Time  I have verified that the signature is that of the patient or patient’s representative, and that it was signed before the procedure  Patient Name: Maggie Pelletier     : 1945                 Printed: February 15, 2024     Medical Record #: [id number]                     Page 2 of 2